# Patient Record
Sex: MALE | Race: WHITE | NOT HISPANIC OR LATINO | Employment: FULL TIME | URBAN - METROPOLITAN AREA
[De-identification: names, ages, dates, MRNs, and addresses within clinical notes are randomized per-mention and may not be internally consistent; named-entity substitution may affect disease eponyms.]

---

## 2017-04-25 ENCOUNTER — HOSPITAL ENCOUNTER (OUTPATIENT)
Dept: OTHER | Facility: HOSPITAL | Age: 37
Setting detail: SPECIMEN
Discharge: HOME | End: 2017-04-25
Attending: NURSE PRACTITIONER | Admitting: NURSE PRACTITIONER

## 2017-04-25 LAB
ALBUMIN SERPL-MCNC: 4 G/DL (ref 3.5–4.8)
ALBUMIN/GLOB SERPL: 1.3 {RATIO} (ref 1–1.7)
ALP SERPL-CCNC: 83 IU/L (ref 32–91)
ALT SERPL-CCNC: 24 IU/L (ref 17–63)
ANION GAP SERPL CALC-SCNC: 15.3 MMOL/L (ref 10–20)
AST SERPL-CCNC: 25 IU/L (ref 15–41)
BASOPHILS # BLD AUTO: 0 10*3/UL (ref 0–0.2)
BASOPHILS NFR BLD AUTO: 1 % (ref 0–2)
BILIRUB SERPL-MCNC: 0.5 MG/DL (ref 0.3–1.2)
BUN SERPL-MCNC: 7 MG/DL (ref 8–20)
BUN/CREAT SERPL: 8.8 (ref 6.2–20.3)
CALCIUM SERPL-MCNC: 9.6 MG/DL (ref 8.9–10.3)
CHLORIDE SERPL-SCNC: 102 MMOL/L (ref 101–111)
CHOLEST SERPL-MCNC: 270 MG/DL
CHOLEST/HDLC SERPL: 7.7 {RATIO}
CONV CO2: 26 MMOL/L (ref 22–32)
CONV LDL CHOLESTEROL DIRECT: 167 MG/DL (ref 0–100)
CONV TOTAL PROTEIN: 7 G/DL (ref 6.1–7.9)
CREAT UR-MCNC: 0.8 MG/DL (ref 0.7–1.2)
DIFFERENTIAL METHOD BLD: (no result)
EOSINOPHIL # BLD AUTO: 0 % (ref 0–3)
EOSINOPHIL # BLD AUTO: 0 10*3/UL (ref 0–0.3)
ERYTHROCYTE [DISTWIDTH] IN BLOOD BY AUTOMATED COUNT: 14.4 % (ref 11.5–14.5)
GLOBULIN UR ELPH-MCNC: 3 G/DL (ref 2.5–3.8)
GLUCOSE SERPL-MCNC: 107 MG/DL (ref 65–99)
HCT VFR BLD AUTO: 45.5 % (ref 40–54)
HDLC SERPL-MCNC: 35 MG/DL
HGB BLD-MCNC: 15.5 G/DL (ref 14–18)
LDLC/HDLC SERPL: 4.8 {RATIO}
LIPID INTERPRETATION: ABNORMAL
LYMPHOCYTES # BLD AUTO: 1.2 10*3/UL (ref 0.8–4.8)
LYMPHOCYTES NFR BLD AUTO: 21 % (ref 18–42)
MCH RBC QN AUTO: 28.8 PG (ref 26–32)
MCHC RBC AUTO-ENTMCNC: 34.1 G/DL (ref 32–36)
MCV RBC AUTO: 84.5 FL (ref 80–94)
MONOCYTES # BLD AUTO: 0.4 10*3/UL (ref 0.1–1.3)
MONOCYTES NFR BLD AUTO: 7 % (ref 2–11)
NEUTROPHILS # BLD AUTO: 3.8 10*3/UL (ref 2.3–8.6)
NEUTROPHILS NFR BLD AUTO: 71 % (ref 50–75)
NRBC BLD AUTO-RTO: 0 /100{WBCS}
NRBC/RBC NFR BLD MANUAL: 0 10*3/UL
PLATELET # BLD AUTO: 215 10*3/UL (ref 150–450)
PMV BLD AUTO: 8.7 FL (ref 7.4–10.4)
POTASSIUM SERPL-SCNC: 4.3 MMOL/L (ref 3.6–5.1)
RBC # BLD AUTO: 5.38 10*6/UL (ref 4.6–6)
SODIUM SERPL-SCNC: 139 MMOL/L (ref 136–144)
TRIGL SERPL-MCNC: 442 MG/DL
TSH SERPL-ACNC: 1.35 UIU/ML (ref 0.34–5.6)
VLDLC SERPL CALC-MCNC: 67.7 MG/DL
WBC # BLD AUTO: 5.5 10*3/UL (ref 4.5–11.5)

## 2017-09-22 ENCOUNTER — HOSPITAL ENCOUNTER (OUTPATIENT)
Dept: OTHER | Facility: HOSPITAL | Age: 37
Setting detail: SPECIMEN
Discharge: HOME | End: 2017-09-22
Attending: NURSE PRACTITIONER | Admitting: NURSE PRACTITIONER

## 2018-02-27 ENCOUNTER — HOSPITAL ENCOUNTER (OUTPATIENT)
Dept: OTHER | Facility: HOSPITAL | Age: 38
Setting detail: SPECIMEN
Discharge: HOME | End: 2018-02-27
Attending: NURSE PRACTITIONER | Admitting: NURSE PRACTITIONER

## 2018-02-27 LAB
ALBUMIN SERPL-MCNC: 4.2 G/DL (ref 3.5–4.8)
ALBUMIN/GLOB SERPL: 1.3 {RATIO} (ref 1–1.7)
ALP SERPL-CCNC: 100 IU/L (ref 32–91)
ALT SERPL-CCNC: 42 IU/L (ref 17–63)
ANION GAP SERPL CALC-SCNC: 17.4 MMOL/L (ref 10–20)
AST SERPL-CCNC: 41 IU/L (ref 15–41)
BASOPHILS # BLD AUTO: 0 10*3/UL (ref 0–0.2)
BASOPHILS NFR BLD AUTO: 1 % (ref 0–2)
BILIRUB SERPL-MCNC: 0.9 MG/DL (ref 0.3–1.2)
BUN SERPL-MCNC: 6 MG/DL (ref 8–20)
BUN/CREAT SERPL: 7.5 (ref 6.2–20.3)
CALCIUM SERPL-MCNC: 9.1 MG/DL (ref 8.9–10.3)
CHLORIDE SERPL-SCNC: 97 MMOL/L (ref 101–111)
CHOLEST SERPL-MCNC: 272 MG/DL
CHOLEST/HDLC SERPL: 8 {RATIO}
CONV CO2: 21 MMOL/L (ref 22–32)
CONV LDL CHOLESTEROL DIRECT: 178 MG/DL (ref 0–100)
CONV TOTAL PROTEIN: 7.5 G/DL (ref 6.1–7.9)
CREAT UR-MCNC: 0.8 MG/DL (ref 0.7–1.2)
DIFFERENTIAL METHOD BLD: (no result)
EOSINOPHIL # BLD AUTO: 0 % (ref 0–3)
EOSINOPHIL # BLD AUTO: 0 10*3/UL (ref 0–0.3)
ERYTHROCYTE [DISTWIDTH] IN BLOOD BY AUTOMATED COUNT: 14.1 % (ref 11.5–14.5)
GLOBULIN UR ELPH-MCNC: 3.3 G/DL (ref 2.5–3.8)
GLUCOSE SERPL-MCNC: 103 MG/DL (ref 65–99)
HCT VFR BLD AUTO: 42.7 % (ref 40–54)
HDLC SERPL-MCNC: 34 MG/DL
HGB BLD-MCNC: 15 G/DL (ref 14–18)
LDLC/HDLC SERPL: 5.2 {RATIO}
LIPID INTERPRETATION: ABNORMAL
LYMPHOCYTES # BLD AUTO: 1.1 10*3/UL (ref 0.8–4.8)
LYMPHOCYTES NFR BLD AUTO: 18 % (ref 18–42)
MCH RBC QN AUTO: 30.2 PG (ref 26–32)
MCHC RBC AUTO-ENTMCNC: 35.1 G/DL (ref 32–36)
MCV RBC AUTO: 86 FL (ref 80–94)
MONOCYTES # BLD AUTO: 0.4 10*3/UL (ref 0.1–1.3)
MONOCYTES NFR BLD AUTO: 7 % (ref 2–11)
NEUTROPHILS # BLD AUTO: 4.5 10*3/UL (ref 2.3–8.6)
NEUTROPHILS NFR BLD AUTO: 74 % (ref 50–75)
NRBC BLD AUTO-RTO: 0 /100{WBCS}
NRBC/RBC NFR BLD MANUAL: 0 10*3/UL
PLATELET # BLD AUTO: 252 10*3/UL (ref 150–450)
PMV BLD AUTO: 8.1 FL (ref 7.4–10.4)
POTASSIUM SERPL-SCNC: 3.4 MMOL/L (ref 3.6–5.1)
RBC # BLD AUTO: 4.97 10*6/UL (ref 4.6–6)
SODIUM SERPL-SCNC: 132 MMOL/L (ref 136–144)
TRIGL SERPL-MCNC: 342 MG/DL
VLDLC SERPL CALC-MCNC: 60 MG/DL
WBC # BLD AUTO: 6 10*3/UL (ref 4.5–11.5)

## 2018-05-22 ENCOUNTER — HOSPITAL ENCOUNTER (OUTPATIENT)
Dept: OTHER | Facility: HOSPITAL | Age: 38
Setting detail: SPECIMEN
Discharge: HOME | End: 2018-05-22
Attending: NURSE PRACTITIONER | Admitting: NURSE PRACTITIONER

## 2018-05-22 LAB
ALBUMIN SERPL-MCNC: 4 G/DL (ref 3.5–4.8)
ALBUMIN/GLOB SERPL: 1.3 {RATIO} (ref 1–1.7)
ALP SERPL-CCNC: 99 IU/L (ref 32–91)
ALT SERPL-CCNC: 41 IU/L (ref 17–63)
ANION GAP SERPL CALC-SCNC: 12.6 MMOL/L (ref 10–20)
AST SERPL-CCNC: 41 IU/L (ref 15–41)
BILIRUB SERPL-MCNC: 0.6 MG/DL (ref 0.3–1.2)
BUN SERPL-MCNC: 4 MG/DL (ref 8–20)
BUN/CREAT SERPL: 5 (ref 6.2–20.3)
CALCIUM SERPL-MCNC: 9.6 MG/DL (ref 8.9–10.3)
CHLORIDE SERPL-SCNC: 102 MMOL/L (ref 101–111)
CHOLEST SERPL-MCNC: 240 MG/DL
CHOLEST/HDLC SERPL: 6.7 {RATIO}
CONV CO2: 26 MMOL/L (ref 22–32)
CONV LDL CHOLESTEROL DIRECT: 164 MG/DL (ref 0–100)
CONV TOTAL PROTEIN: 7.1 G/DL (ref 6.1–7.9)
CREAT UR-MCNC: 0.8 MG/DL (ref 0.7–1.2)
GLOBULIN UR ELPH-MCNC: 3.1 G/DL (ref 2.5–3.8)
GLUCOSE SERPL-MCNC: 90 MG/DL (ref 65–99)
HDLC SERPL-MCNC: 36 MG/DL
LDLC/HDLC SERPL: 4.6 {RATIO}
LIPID INTERPRETATION: ABNORMAL
POTASSIUM SERPL-SCNC: 3.6 MMOL/L (ref 3.6–5.1)
SODIUM SERPL-SCNC: 137 MMOL/L (ref 136–144)
TRIGL SERPL-MCNC: 291 MG/DL
VLDLC SERPL CALC-MCNC: 40.5 MG/DL

## 2018-11-16 ENCOUNTER — HOSPITAL ENCOUNTER (OUTPATIENT)
Dept: OTHER | Facility: HOSPITAL | Age: 38
Setting detail: SPECIMEN
Discharge: HOME | End: 2018-11-16
Attending: NURSE PRACTITIONER | Admitting: NURSE PRACTITIONER

## 2018-11-16 LAB
ALBUMIN SERPL-MCNC: 3.9 G/DL (ref 3.5–4.8)
ALBUMIN/GLOB SERPL: 1.2 {RATIO} (ref 1–1.7)
ALP SERPL-CCNC: 135 IU/L (ref 32–91)
ALT SERPL-CCNC: 34 IU/L (ref 17–63)
ANION GAP SERPL CALC-SCNC: 13.8 MMOL/L (ref 10–20)
AST SERPL-CCNC: 32 IU/L (ref 15–41)
BASOPHILS # BLD AUTO: 0 10*3/UL (ref 0–0.2)
BASOPHILS NFR BLD AUTO: 0 % (ref 0–2)
BILIRUB SERPL-MCNC: 0.5 MG/DL (ref 0.3–1.2)
BUN SERPL-MCNC: 4 MG/DL (ref 8–20)
BUN/CREAT SERPL: 5.7 (ref 6.2–20.3)
CALCIUM SERPL-MCNC: 8.8 MG/DL (ref 8.9–10.3)
CHLORIDE SERPL-SCNC: 100 MMOL/L (ref 101–111)
CHOLEST SERPL-MCNC: 171 MG/DL
CHOLEST/HDLC SERPL: 4.9 {RATIO}
CONV CO2: 26 MMOL/L (ref 22–32)
CONV LDL CHOLESTEROL DIRECT: 114 MG/DL (ref 0–100)
CONV TOTAL PROTEIN: 7.1 G/DL (ref 6.1–7.9)
CREAT UR-MCNC: 0.7 MG/DL (ref 0.7–1.2)
DIFFERENTIAL METHOD BLD: (no result)
EOSINOPHIL # BLD AUTO: 0 % (ref 0–3)
EOSINOPHIL # BLD AUTO: 0 10*3/UL (ref 0–0.3)
ERYTHROCYTE [DISTWIDTH] IN BLOOD BY AUTOMATED COUNT: 14 % (ref 11.5–14.5)
GLOBULIN UR ELPH-MCNC: 3.2 G/DL (ref 2.5–3.8)
GLUCOSE SERPL-MCNC: 122 MG/DL (ref 65–99)
HCT VFR BLD AUTO: 44.4 % (ref 40–54)
HDLC SERPL-MCNC: 35 MG/DL
HGB BLD-MCNC: 15.4 G/DL (ref 14–18)
LDLC/HDLC SERPL: 3.3 {RATIO}
LIPID INTERPRETATION: ABNORMAL
LYMPHOCYTES # BLD AUTO: 1 10*3/UL (ref 0.8–4.8)
LYMPHOCYTES NFR BLD AUTO: 18 % (ref 18–42)
MCH RBC QN AUTO: 29.4 PG (ref 26–32)
MCHC RBC AUTO-ENTMCNC: 34.7 G/DL (ref 32–36)
MCV RBC AUTO: 84.8 FL (ref 80–94)
MONOCYTES # BLD AUTO: 0.4 10*3/UL (ref 0.1–1.3)
MONOCYTES NFR BLD AUTO: 7 % (ref 2–11)
NEUTROPHILS # BLD AUTO: 4.4 10*3/UL (ref 2.3–8.6)
NEUTROPHILS NFR BLD AUTO: 75 % (ref 50–75)
NRBC BLD AUTO-RTO: 0 /100{WBCS}
NRBC/RBC NFR BLD MANUAL: 0 10*3/UL
PLATELET # BLD AUTO: 207 10*3/UL (ref 150–450)
PMV BLD AUTO: 8.1 FL (ref 7.4–10.4)
POTASSIUM SERPL-SCNC: 3.8 MMOL/L (ref 3.6–5.1)
RBC # BLD AUTO: 5.23 10*6/UL (ref 4.6–6)
SODIUM SERPL-SCNC: 136 MMOL/L (ref 136–144)
TRIGL SERPL-MCNC: 226 MG/DL
URATE SERPL-MCNC: 7.8 MG/DL (ref 4.8–8.7)
VLDLC SERPL CALC-MCNC: 22.5 MG/DL
WBC # BLD AUTO: 5.9 10*3/UL (ref 4.5–11.5)

## 2019-07-03 RX ORDER — CELECOXIB 200 MG/1
CAPSULE ORAL
Qty: 30 CAPSULE | Refills: 0 | Status: SHIPPED | OUTPATIENT
Start: 2019-07-03 | End: 2019-07-31 | Stop reason: SDUPTHER

## 2019-07-09 RX ORDER — OMEPRAZOLE 40 MG/1
CAPSULE, DELAYED RELEASE ORAL
Qty: 90 CAPSULE | Refills: 0 | Status: SHIPPED | OUTPATIENT
Start: 2019-07-09 | End: 2019-07-14 | Stop reason: SDUPTHER

## 2019-07-11 RX ORDER — ATORVASTATIN CALCIUM 10 MG/1
TABLET, FILM COATED ORAL
Qty: 30 TABLET | Refills: 0 | Status: SHIPPED | OUTPATIENT
Start: 2019-07-11 | End: 2019-08-10 | Stop reason: SDUPTHER

## 2019-07-13 PROCEDURE — 87070 CULTURE OTHR SPECIMN AEROBIC: CPT | Performed by: FAMILY MEDICINE

## 2019-07-13 PROCEDURE — 87205 SMEAR GRAM STAIN: CPT | Performed by: FAMILY MEDICINE

## 2019-07-13 PROCEDURE — 87186 SC STD MICRODIL/AGAR DIL: CPT | Performed by: FAMILY MEDICINE

## 2019-07-13 PROCEDURE — 87147 CULTURE TYPE IMMUNOLOGIC: CPT | Performed by: FAMILY MEDICINE

## 2019-07-15 RX ORDER — OMEPRAZOLE 40 MG/1
CAPSULE, DELAYED RELEASE ORAL
Qty: 90 CAPSULE | Refills: 0 | Status: SHIPPED | OUTPATIENT
Start: 2019-07-15 | End: 2020-01-06 | Stop reason: SDUPTHER

## 2019-07-16 ENCOUNTER — TELEPHONE (OUTPATIENT)
Dept: URGENT CARE | Facility: CLINIC | Age: 39
End: 2019-07-16

## 2019-07-31 RX ORDER — CELECOXIB 200 MG/1
CAPSULE ORAL
Qty: 30 CAPSULE | Refills: 0 | Status: SHIPPED | OUTPATIENT
Start: 2019-07-31 | End: 2019-08-26 | Stop reason: SDUPTHER

## 2019-08-12 RX ORDER — ATORVASTATIN CALCIUM 10 MG/1
TABLET, FILM COATED ORAL
Qty: 30 TABLET | Refills: 0 | Status: SHIPPED | OUTPATIENT
Start: 2019-08-12 | End: 2019-09-07 | Stop reason: SDUPTHER

## 2019-08-13 RX ORDER — LISINOPRIL 10 MG/1
TABLET ORAL
Qty: 90 TABLET | Refills: 0 | Status: SHIPPED | OUTPATIENT
Start: 2019-08-13 | End: 2019-11-10 | Stop reason: SDUPTHER

## 2019-08-13 RX ORDER — ALLOPURINOL 100 MG/1
TABLET ORAL
Qty: 90 TABLET | Refills: 0 | Status: SHIPPED | OUTPATIENT
Start: 2019-08-13 | End: 2019-11-10 | Stop reason: SDUPTHER

## 2019-08-26 RX ORDER — CELECOXIB 200 MG/1
CAPSULE ORAL
Qty: 30 CAPSULE | Refills: 0 | Status: SHIPPED | OUTPATIENT
Start: 2019-08-26 | End: 2019-09-23 | Stop reason: SDUPTHER

## 2019-09-09 RX ORDER — ATORVASTATIN CALCIUM 10 MG/1
TABLET, FILM COATED ORAL
Qty: 30 TABLET | Refills: 0 | Status: SHIPPED | OUTPATIENT
Start: 2019-09-09 | End: 2020-01-17 | Stop reason: SDUPTHER

## 2019-09-23 RX ORDER — CELECOXIB 200 MG/1
200 CAPSULE ORAL DAILY
Qty: 90 CAPSULE | Refills: 0 | Status: SHIPPED | OUTPATIENT
Start: 2019-09-23 | End: 2021-03-22

## 2019-09-23 RX ORDER — CELECOXIB 200 MG/1
200 CAPSULE ORAL DAILY
Qty: 30 CAPSULE | Refills: 0 | Status: SHIPPED | OUTPATIENT
Start: 2019-09-23 | End: 2019-09-23 | Stop reason: SDUPTHER

## 2019-11-13 RX ORDER — ALLOPURINOL 100 MG/1
TABLET ORAL
Qty: 90 TABLET | Refills: 0 | Status: SHIPPED | OUTPATIENT
Start: 2019-11-13 | End: 2020-02-10

## 2019-11-13 RX ORDER — LISINOPRIL 10 MG/1
TABLET ORAL
Qty: 90 TABLET | Refills: 0 | Status: SHIPPED | OUTPATIENT
Start: 2019-11-13 | End: 2020-01-17 | Stop reason: SDUPTHER

## 2020-01-06 RX ORDER — OMEPRAZOLE 40 MG/1
40 CAPSULE, DELAYED RELEASE ORAL DAILY
Qty: 90 CAPSULE | Refills: 0 | Status: SHIPPED | OUTPATIENT
Start: 2020-01-06 | End: 2020-04-06

## 2020-01-13 ENCOUNTER — OFFICE VISIT (OUTPATIENT)
Dept: FAMILY MEDICINE CLINIC | Facility: CLINIC | Age: 40
End: 2020-01-13

## 2020-01-13 VITALS
HEIGHT: 64 IN | WEIGHT: 277.7 LBS | OXYGEN SATURATION: 96 % | HEART RATE: 96 BPM | DIASTOLIC BLOOD PRESSURE: 82 MMHG | SYSTOLIC BLOOD PRESSURE: 135 MMHG | BODY MASS INDEX: 47.41 KG/M2

## 2020-01-13 DIAGNOSIS — E66.01 OBESITY, MORBID, BMI 40.0-49.9 (HCC): ICD-10-CM

## 2020-01-13 DIAGNOSIS — R14.2 FLATULENCE, ERUCTATION AND GAS PAIN: ICD-10-CM

## 2020-01-13 DIAGNOSIS — R14.1 FLATULENCE, ERUCTATION AND GAS PAIN: ICD-10-CM

## 2020-01-13 DIAGNOSIS — B35.1 ONYCHOMYCOSIS: ICD-10-CM

## 2020-01-13 DIAGNOSIS — E78.2 MIXED HYPERLIPIDEMIA: ICD-10-CM

## 2020-01-13 DIAGNOSIS — R14.3 FLATULENCE, ERUCTATION AND GAS PAIN: ICD-10-CM

## 2020-01-13 DIAGNOSIS — K21.00 GASTROESOPHAGEAL REFLUX DISEASE WITH ESOPHAGITIS: Primary | ICD-10-CM

## 2020-01-13 DIAGNOSIS — I10 HYPERTENSION, UNSPECIFIED TYPE: ICD-10-CM

## 2020-01-13 DIAGNOSIS — M10.9 GOUTY ARTHRITIS: ICD-10-CM

## 2020-01-13 PROCEDURE — 80061 LIPID PANEL: CPT | Performed by: NURSE PRACTITIONER

## 2020-01-13 PROCEDURE — 99214 OFFICE O/P EST MOD 30 MIN: CPT | Performed by: NURSE PRACTITIONER

## 2020-01-13 PROCEDURE — 85027 COMPLETE CBC AUTOMATED: CPT | Performed by: NURSE PRACTITIONER

## 2020-01-13 PROCEDURE — 36415 COLL VENOUS BLD VENIPUNCTURE: CPT | Performed by: NURSE PRACTITIONER

## 2020-01-13 PROCEDURE — 80053 COMPREHEN METABOLIC PANEL: CPT | Performed by: NURSE PRACTITIONER

## 2020-01-13 PROCEDURE — 84443 ASSAY THYROID STIM HORMONE: CPT | Performed by: NURSE PRACTITIONER

## 2020-01-13 RX ORDER — CETIRIZINE HYDROCHLORIDE 10 MG/1
TABLET ORAL DAILY PRN
COMMUNITY
Start: 2016-12-22 | End: 2020-12-21

## 2020-01-13 NOTE — PROGRESS NOTES
"Farhad Khan is a 39 y.o. male.     Chief Complaint   Patient presents with   • Abdominal Pain   • Bloated   • Gas   • Nail Problem       History of Present Illness     1. HTN/HLD, on lisinopril and bp stable. Has poor diet, does not exercise. Denies cp, soa, palpitations, takes meds as directed.     2. GERD, takes omeprazole daily, stable on meds, if forgets med has terrible gerd next day, lately with bloating, gas and IBS s/s, diarrhea with \"pepples\" frequently, now last 2 weeks feels constipated. Has gallbladder. 2 weeks ago and again recently, took stool softeners and then felt relieved. Reports severe abd pain, diarrhea with lactose and roughage, unable to pinpoint any other triggers. Had u/s in past and reports showed fatty liver dz.     3. Fungal toenails present for approx 1 year, has tried several otc meds w/o improvement, no oral meds d/t fatty liver, normal LFT's.     Subjective     Visit Vitals  /82 (BP Location: Left arm, Patient Position: Sitting, Cuff Size: Adult)   Pulse 96   Ht 162.6 cm (64.02\")   Wt 126 kg (277 lb 11.2 oz)   SpO2 96%   BMI 47.64 kg/m²       The following portions of the patient's history were reviewed and updated as appropriate: allergies, current medications, past family history, past medical history, past social history, past surgical history and problem list.    Review of Systems   Constitutional: Negative for chills, fatigue and fever.   HENT: Negative for dental problem, ear pain, sinus pressure and sore throat.    Eyes: Negative for visual disturbance.   Respiratory: Negative for cough, shortness of breath and wheezing.    Cardiovascular: Negative for chest pain, palpitations and leg swelling.   Gastrointestinal: Positive for constipation, diarrhea and GERD. Negative for abdominal pain, blood in stool, nausea and vomiting.   Genitourinary: Negative for difficulty urinating, frequency, urgency and urinary incontinence.   Musculoskeletal: Negative for " arthralgias, back pain, gait problem, joint swelling, myalgias and neck pain.   Skin: Negative for dry skin, pallor and rash.        fungal toenails   Neurological: Negative for dizziness, seizures, speech difficulty and weakness.   Hematological: Negative for adenopathy.   Psychiatric/Behavioral: Negative for sleep disturbance, depressed mood and stress. The patient is not nervous/anxious.        Objective     Physical Exam   Constitutional: He is oriented to person, place, and time. He appears well-developed and well-nourished. No distress. He is morbidly obese.  HENT:   Head: Normocephalic.   Eyes: Pupils are equal, round, and reactive to light. Conjunctivae are normal.   Neck: Normal range of motion. Neck supple. No JVD present. No thyromegaly present.   Cardiovascular: Normal rate, regular rhythm and normal heart sounds.   No murmur heard.  Pulmonary/Chest: Effort normal and breath sounds normal. No respiratory distress.   Abdominal: Soft. Bowel sounds are normal. He exhibits no distension. There is no tenderness.   Musculoskeletal: Normal range of motion. He exhibits edema. He exhibits no tenderness.   Neurological: He is alert and oriented to person, place, and time. No sensory deficit.   Skin: Skin is warm and dry. No rash noted. He is not diaphoretic. No cyanosis (all toenails yellow/thickened with brownish discoloration of the nailbed/cuticle) or erythema.   Psychiatric: He has a normal mood and affect. His behavior is normal. Judgment normal.   Nursing note and vitals reviewed.        Assessment/Plan   Farhad was seen today for abdominal pain, bloated, gas and nail problem.    Diagnoses and all orders for this visit:    Gastroesophageal reflux disease with esophagitis  Continue omeprazole daily, limit triggers such as salads and lactose. Keep food log, consider linzess and/or bentyl if constipation continues.    Mixed hyperlipidemia  -     Lipid Panel  Continue atorvastatin, discussed in length  Healthier heart diet, exercise 30 min 3 days/week, given [facility] handout for choosing foods, Increase water, limit orange soda.     Hypertension, unspecified type  -     CBC (No Diff)  -     TSH  -     Comprehensive Metabolic Panel  bp stable, rf all meds after reviewing labs    Gouty arthritis  Only uses allopurinol and celebrex when has gout flare, discussed limiting purines    Onychomycosis  Try vicks vapor rub BID to toenails, hx of fatty liver, hold off on oral fungal meds.    Flatulence, eructation and gas pain  Keep food log, eliminate triggers, consider bentyl    Morbid obesity   d/w pt weight loss, healthier lifestyle mods.      Return in 3 mo, will call or give wife information on his labs, will need atorva, lisinopril refilled once labs reviewed.             Glucose   Date Value Ref Range Status   11/16/2018 122 (H) 65 - 99 mg/dL Final     BUN   Date Value Ref Range Status   11/16/2018 4 (L) 8 - 20 mg/dL Final     Creatinine   Date Value Ref Range Status   11/16/2018 0.7 0.7 - 1.2 mg/dl Final     Sodium   Date Value Ref Range Status   11/16/2018 136 136 - 144 mmol/L Final     Potassium   Date Value Ref Range Status   11/16/2018 3.8 3.6 - 5.1 mmol/L Final     Chloride   Date Value Ref Range Status   11/16/2018 100 (L) 101 - 111 mmol/L Final     CO2   Date Value Ref Range Status   11/16/2018 26 22 - 32 mmol/L Final     Calcium   Date Value Ref Range Status   11/16/2018 8.8 (L) 8.9 - 10.3 mg/dL Final     Total Protein   Date Value Ref Range Status   11/16/2018 7.1 6.1 - 7.9 g/dL Final     Albumin   Date Value Ref Range Status   11/16/2018 3.9 3.5 - 4.8 g/dL Final     ALT (SGPT)   Date Value Ref Range Status   11/16/2018 34 17 - 63 IU/L Final     AST (SGOT)   Date Value Ref Range Status   11/16/2018 32 15 - 41 IU/L Final     Alkaline Phosphatase   Date Value Ref Range Status   11/16/2018 135 (H) 32 - 91 IU/L Final     Total Bilirubin   Date Value Ref Range Status   11/16/2018 0.5 0.3 - 1.2 mg/dL Final     A/G  Ratio   Date Value Ref Range Status   11/16/2018 1.2 1.0 - 1.7 Final     BUN/Creatinine Ratio   Date Value Ref Range Status   11/16/2018 5.7 (L) 6.2 - 20.3 Final     Anion Gap   Date Value Ref Range Status   11/16/2018 13.8 10 - 20 Final

## 2020-01-14 LAB
ALBUMIN SERPL-MCNC: 4 G/DL (ref 3.5–5.2)
ALBUMIN/GLOB SERPL: 1.1 G/DL
ALP SERPL-CCNC: 152 U/L (ref 39–117)
ALT SERPL W P-5'-P-CCNC: 33 U/L (ref 1–41)
ANION GAP SERPL CALCULATED.3IONS-SCNC: 16.1 MMOL/L (ref 5–15)
AST SERPL-CCNC: 35 U/L (ref 1–40)
BILIRUB SERPL-MCNC: 0.3 MG/DL (ref 0.2–1.2)
BUN BLD-MCNC: 4 MG/DL (ref 6–20)
BUN/CREAT SERPL: 5.7 (ref 7–25)
CALCIUM SPEC-SCNC: 8.8 MG/DL (ref 8.6–10.5)
CHLORIDE SERPL-SCNC: 95 MMOL/L (ref 98–107)
CHOLEST SERPL-MCNC: 181 MG/DL (ref 0–200)
CO2 SERPL-SCNC: 20.9 MMOL/L (ref 22–29)
CREAT BLD-MCNC: 0.7 MG/DL (ref 0.76–1.27)
DEPRECATED RDW RBC AUTO: 40.7 FL (ref 37–54)
ERYTHROCYTE [DISTWIDTH] IN BLOOD BY AUTOMATED COUNT: 13.2 % (ref 12.3–15.4)
GFR SERPL CREATININE-BSD FRML MDRD: 124 ML/MIN/1.73
GLOBULIN UR ELPH-MCNC: 3.5 GM/DL
GLUCOSE BLD-MCNC: 93 MG/DL (ref 65–99)
HCT VFR BLD AUTO: 44.7 % (ref 37.5–51)
HDLC SERPL-MCNC: 31 MG/DL (ref 40–60)
HGB BLD-MCNC: 15.1 G/DL (ref 13–17.7)
LDLC SERPL CALC-MCNC: 105 MG/DL (ref 0–100)
LDLC/HDLC SERPL: 3.37 {RATIO}
MCH RBC QN AUTO: 29 PG (ref 26.6–33)
MCHC RBC AUTO-ENTMCNC: 33.8 G/DL (ref 31.5–35.7)
MCV RBC AUTO: 85.8 FL (ref 79–97)
PLATELET # BLD AUTO: 195 10*3/MM3 (ref 140–450)
PMV BLD AUTO: 10.3 FL (ref 6–12)
POTASSIUM BLD-SCNC: 3.7 MMOL/L (ref 3.5–5.2)
PROT SERPL-MCNC: 7.5 G/DL (ref 6–8.5)
RBC # BLD AUTO: 5.21 10*6/MM3 (ref 4.14–5.8)
SODIUM BLD-SCNC: 132 MMOL/L (ref 136–145)
TRIGL SERPL-MCNC: 227 MG/DL (ref 0–150)
TSH SERPL DL<=0.05 MIU/L-ACNC: 1.76 UIU/ML (ref 0.27–4.2)
VLDLC SERPL-MCNC: 45.4 MG/DL (ref 5–40)
WBC NRBC COR # BLD: 4.54 10*3/MM3 (ref 3.4–10.8)

## 2020-01-17 RX ORDER — LISINOPRIL 10 MG/1
10 TABLET ORAL DAILY
Qty: 90 TABLET | Refills: 1 | Status: SHIPPED | OUTPATIENT
Start: 2020-01-17 | End: 2020-07-30

## 2020-01-17 RX ORDER — ATORVASTATIN CALCIUM 10 MG/1
10 TABLET, FILM COATED ORAL NIGHTLY
Qty: 90 TABLET | Refills: 1 | Status: SHIPPED | OUTPATIENT
Start: 2020-01-17 | End: 2020-07-23

## 2020-02-10 RX ORDER — ALLOPURINOL 100 MG/1
TABLET ORAL
Qty: 90 TABLET | Refills: 0 | Status: SHIPPED | OUTPATIENT
Start: 2020-02-10 | End: 2020-05-07

## 2020-04-06 RX ORDER — OMEPRAZOLE 40 MG/1
CAPSULE, DELAYED RELEASE ORAL
Qty: 90 CAPSULE | Refills: 0 | Status: SHIPPED | OUTPATIENT
Start: 2020-04-06 | End: 2020-07-02

## 2020-05-07 RX ORDER — ALLOPURINOL 100 MG/1
TABLET ORAL
Qty: 90 TABLET | Refills: 0 | Status: SHIPPED | OUTPATIENT
Start: 2020-05-07 | End: 2020-08-05

## 2020-07-02 RX ORDER — OMEPRAZOLE 40 MG/1
CAPSULE, DELAYED RELEASE ORAL
Qty: 90 CAPSULE | Refills: 0 | Status: SHIPPED | OUTPATIENT
Start: 2020-07-02 | End: 2020-10-02

## 2020-07-23 RX ORDER — ATORVASTATIN CALCIUM 10 MG/1
TABLET, FILM COATED ORAL
Qty: 90 TABLET | Refills: 0 | Status: SHIPPED | OUTPATIENT
Start: 2020-07-23 | End: 2020-10-26

## 2020-07-30 RX ORDER — LISINOPRIL 10 MG/1
TABLET ORAL
Qty: 90 TABLET | Refills: 0 | Status: SHIPPED | OUTPATIENT
Start: 2020-07-30 | End: 2020-10-30

## 2020-08-05 RX ORDER — ALLOPURINOL 100 MG/1
TABLET ORAL
Qty: 90 TABLET | Refills: 0 | Status: SHIPPED | OUTPATIENT
Start: 2020-08-05 | End: 2020-10-30

## 2020-10-02 RX ORDER — OMEPRAZOLE 40 MG/1
CAPSULE, DELAYED RELEASE ORAL
Qty: 90 CAPSULE | Refills: 0 | Status: SHIPPED | OUTPATIENT
Start: 2020-10-02 | End: 2021-01-05

## 2020-10-26 RX ORDER — ATORVASTATIN CALCIUM 10 MG/1
TABLET, FILM COATED ORAL
Qty: 90 TABLET | Refills: 0 | Status: SHIPPED | OUTPATIENT
Start: 2020-10-26 | End: 2020-12-21

## 2020-10-30 RX ORDER — ALLOPURINOL 100 MG/1
TABLET ORAL
Qty: 90 TABLET | Refills: 0 | Status: SHIPPED | OUTPATIENT
Start: 2020-10-30 | End: 2021-01-28

## 2020-10-30 RX ORDER — LISINOPRIL 10 MG/1
TABLET ORAL
Qty: 90 TABLET | Refills: 0 | Status: SHIPPED | OUTPATIENT
Start: 2020-10-30 | End: 2021-01-28

## 2020-11-20 ENCOUNTER — OFFICE VISIT (OUTPATIENT)
Dept: FAMILY MEDICINE CLINIC | Facility: CLINIC | Age: 40
End: 2020-11-20

## 2020-11-20 VITALS
DIASTOLIC BLOOD PRESSURE: 80 MMHG | BODY MASS INDEX: 46.61 KG/M2 | SYSTOLIC BLOOD PRESSURE: 124 MMHG | HEIGHT: 64 IN | WEIGHT: 273 LBS | TEMPERATURE: 97.7 F | HEART RATE: 82 BPM | OXYGEN SATURATION: 99 %

## 2020-11-20 DIAGNOSIS — E78.2 MIXED HYPERLIPIDEMIA: ICD-10-CM

## 2020-11-20 DIAGNOSIS — R19.7 DIARRHEA, UNSPECIFIED TYPE: ICD-10-CM

## 2020-11-20 DIAGNOSIS — R10.84 GENERALIZED ABDOMINAL PAIN: Primary | ICD-10-CM

## 2020-11-20 DIAGNOSIS — I10 HYPERTENSION, UNSPECIFIED TYPE: ICD-10-CM

## 2020-11-20 PROCEDURE — 36415 COLL VENOUS BLD VENIPUNCTURE: CPT | Performed by: NURSE PRACTITIONER

## 2020-11-20 PROCEDURE — 80061 LIPID PANEL: CPT | Performed by: NURSE PRACTITIONER

## 2020-11-20 PROCEDURE — 80053 COMPREHEN METABOLIC PANEL: CPT | Performed by: NURSE PRACTITIONER

## 2020-11-20 PROCEDURE — 84443 ASSAY THYROID STIM HORMONE: CPT | Performed by: NURSE PRACTITIONER

## 2020-11-20 PROCEDURE — 85025 COMPLETE CBC W/AUTO DIFF WBC: CPT | Performed by: NURSE PRACTITIONER

## 2020-11-20 PROCEDURE — 99214 OFFICE O/P EST MOD 30 MIN: CPT | Performed by: NURSE PRACTITIONER

## 2020-11-20 NOTE — PATIENT INSTRUCTIONS
Diarrhea, Adult  Diarrhea is frequent loose and watery bowel movements. Diarrhea can make you feel weak and cause you to become dehydrated. Dehydration can make you tired and thirsty, cause you to have a dry mouth, and decrease how often you urinate.  Diarrhea typically lasts 2-3 days. However, it can last longer if it is a sign of something more serious. It is important to treat your diarrhea as told by your health care provider.  Follow these instructions at home:  Eating and drinking         Follow these recommendations as told by your health care provider:  · Take an oral rehydration solution (ORS). This is an over-the-counter medicine that helps return your body to its normal balance of nutrients and water. It is found at pharmacies and retail stores.  · Drink plenty of fluids, such as water, ice chips, diluted fruit juice, and low-calorie sports drinks. You can drink milk also, if desired.  · Avoid drinking fluids that contain a lot of sugar or caffeine, such as energy drinks, sports drinks, and soda.  · Eat bland, easy-to-digest foods in small amounts as you are able. These foods include bananas, applesauce, rice, lean meats, toast, and crackers.  · Avoid alcohol.  · Avoid spicy or fatty foods.    Medicines  · Take over-the-counter and prescription medicines only as told by your health care provider.  · If you were prescribed an antibiotic medicine, take it as told by your health care provider. Do not stop using the antibiotic even if you start to feel better.  General instructions    · Wash your hands often using soap and water. If soap and water are not available, use a hand . Others in the household should wash their hands as well. Hands should be washed:  ? After using the toilet or changing a diaper.  ? Before preparing, cooking, or serving food.  ? While caring for a sick person or while visiting someone in a hospital.  · Drink enough fluid to keep your urine pale yellow.  · Rest at home while  you recover.  · Watch your condition for any changes.  · Take a warm bath to relieve any burning or pain from frequent diarrhea episodes.  · Keep all follow-up visits as told by your health care provider. This is important.  Contact a health care provider if:  · You have a fever.  · Your diarrhea gets worse.  · You have new symptoms.  · You cannot keep fluids down.  · You feel light-headed or dizzy.  · You have a headache.  · You have muscle cramps.  Get help right away if:  · You have chest pain.  · You feel extremely weak or you faint.  · You have bloody or black stools or stools that look like tar.  · You have severe pain, cramping, or bloating in your abdomen.  · You have trouble breathing or you are breathing very quickly.  · Your heart is beating very quickly.  · Your skin feels cold and clammy.  · You feel confused.  · You have signs of dehydration, such as:  ? Dark urine, very little urine, or no urine.  ? Cracked lips.  ? Dry mouth.  ? Sunken eyes.  ? Sleepiness.  ? Weakness.  Summary  · Diarrhea is frequent loose and watery bowel movements. Diarrhea can make you feel weak and cause you to become dehydrated.  · Drink enough fluids to keep your urine pale yellow.  · Make sure that you wash your hands after using the toilet. If soap and water are not available, use hand .  · Contact a health care provider if your diarrhea gets worse or you have new symptoms.  · Get help right away if you have signs of dehydration.  This information is not intended to replace advice given to you by your health care provider. Make sure you discuss any questions you have with your health care provider.  Document Released: 12/08/2003 Document Revised: 05/05/2020 Document Reviewed: 05/24/2019  [employer] Patient Education © 2020 [employer]

## 2020-11-20 NOTE — PROGRESS NOTES
Chief Complaint   Patient presents with   • Abdominal Pain   • Bloated       Abdominal Pain  This is a new problem. The current episode started more than 1 month ago (approximately 2 months ago). The onset quality is gradual. The problem occurs 2 to 4 times per day (at least two episodes of diarrhea with each meal). The problem has been gradually worsening. The pain is located in the generalized abdominal region. The pain is mild. The quality of the pain is a sensation of fullness and aching. The abdominal pain does not radiate. Associated symptoms include diarrhea, flatus and nausea. Pertinent negatives include no arthralgias, constipation, dysuria, fever, frequency, vomiting or weight loss. The pain is aggravated by eating and certain positions. The pain is relieved by nothing. Treatments tried: anti-diarrheals, stool softeners. The treatment provided no relief.     History obtained from the patient and his wife.    The following portions of the patient's history were reviewed and updated as appropriate: allergies, current medications, past family history, past medical history, past social history, past surgical history and problem list.    Past Medical History:   Diagnosis Date   • B12 deficiency    • Chronic fatigue    • Dizziness    • Elevated LFTs    • Fatty liver    • GERD (gastroesophageal reflux disease)    • Gout    • Headache    • HSV infection    • Hyperglycemia    • Hyperlipidemia    • Hypertension    • Obesity    • Obstructive sleep apnea    • Onychomycosis    • Pain in joint, multiple sites    • Sebaceous cyst    • Skin nodule    • Snoring    • Vision changes      History reviewed. No pertinent surgical history.  Family History   Problem Relation Age of Onset   • Sleep apnea Father    • Hypertension Other    • Rheum arthritis Other      Social History     Tobacco Use   • Smoking status: Never Smoker   Substance Use Topics   • Alcohol use: No     Frequency: Never         Current Outpatient Medications:  "  •  allopurinol (ZYLOPRIM) 100 MG tablet, TAKE ONE TABLET BY MOUTH DAILY, Disp: 90 tablet, Rfl: 0  •  atorvastatin (LIPITOR) 10 MG tablet, TAKE ONE TABLET BY MOUTH ONCE NIGHTLY, Disp: 90 tablet, Rfl: 0  •  cetirizine (zyrTEC) 10 MG tablet, Daily As Needed., Disp: , Rfl:   •  lisinopril (PRINIVIL,ZESTRIL) 10 MG tablet, TAKE ONE TABLET BY MOUTH DAILY, Disp: 90 tablet, Rfl: 0  •  omeprazole (priLOSEC) 40 MG capsule, TAKE ONE CAPSULE BY MOUTH DAILY, Disp: 90 capsule, Rfl: 0  •  celecoxib (CeleBREX) 200 MG capsule, TAKE 1 CAPSULE BY MOUTH DAILY, Disp: 90 capsule, Rfl: 0        Review of Systems   Constitutional: Negative for fever and unexpected weight loss.   HENT: Negative for sore throat.    Respiratory: Negative for cough and wheezing.    Cardiovascular: Negative for leg swelling.   Gastrointestinal: Positive for abdominal pain, diarrhea, flatus and nausea. Negative for constipation, vomiting and indigestion.   Genitourinary: Negative for dysuria, frequency and urgency.   Musculoskeletal: Negative for arthralgias and gait problem.   Skin: Negative for rash and skin lesions.   Neurological: Negative for dizziness, weakness and headache.   Psychiatric/Behavioral: Negative for depressed mood. The patient is not nervous/anxious.        Vitals:    11/20/20 1332   BP: 124/80   BP Location: Left arm   Patient Position: Sitting   Cuff Size: Large Adult   Pulse: 82   Temp: 97.7 °F (36.5 °C)   TempSrc: Skin   SpO2: 99%   Weight: 124 kg (273 lb)   Height: 162.6 cm (64.02\")     Body mass index is 46.84 kg/m².      PHQ-9 Depression Screening  Little interest or pleasure in doing things? 0   Feeling down, depressed, or hopeless? 0   Trouble falling or staying asleep, or sleeping too much?     Feeling tired or having little energy?     Poor appetite or overeating?     Feeling bad about yourself - or that you are a failure or have let yourself or your family down?     Trouble concentrating on things, such as reading the newspaper " or watching television?     Moving or speaking so slowly that other people could have noticed? Or the opposite - being so fidgety or restless that you have been moving around a lot more than usual?     Thoughts that you would be better off dead, or of hurting yourself in some way?     PHQ-9 Total Score 0   If you checked off any problems, how difficult have these problems made it for you to do your work, take care of things at home, or get along with other people? @[facility]([phone]  [id number])@        Physical Exam  Constitutional:       Appearance: Normal appearance. He is obese.   HENT:      Head: Normocephalic.   Neck:      Musculoskeletal: Neck supple.   Cardiovascular:      Rate and Rhythm: Normal rate and regular rhythm.   Pulmonary:      Effort: Pulmonary effort is normal.      Breath sounds: Normal breath sounds.   Abdominal:      General: Bowel sounds are decreased. There is distension.      Palpations: Abdomen is soft.      Tenderness: There is no abdominal tenderness.   Musculoskeletal: Normal range of motion.      Right lower leg: No edema.      Left lower leg: No edema.   Skin:     General: Skin is warm and dry.   Neurological:      Mental Status: He is alert and oriented to person, place, and time.      Gait: Gait is intact.   Psychiatric:         Attention and Perception: Attention normal.         Mood and Affect: Mood normal.         Speech: Speech normal.         No visits with results within 7 Day(s) from this visit.   Latest known visit with results is:   Office Visit on 01/13/2020   Component Date Value Ref Range Status   • WBC 01/13/2020 4.54  3.40 - 10.80 10*3/mm3 Final   • RBC 01/13/2020 5.21  4.14 - 5.80 10*6/mm3 Final   • Hemoglobin 01/13/2020 15.1  13.0 - 17.7 g/dL Final   • Hematocrit 01/13/2020 44.7  37.5 - 51.0 % Final   • MCV 01/13/2020 85.8  79.0 - 97.0 fL Final   • MCH 01/13/2020 29.0  26.6 - 33.0 pg Final   • MCHC 01/13/2020 33.8  31.5 - 35.7 g/dL Final   • RDW 01/13/2020 13.2  12.3 -  15.4 % Final   • RDW-SD 01/13/2020 40.7  37.0 - 54.0 fl Final   • MPV 01/13/2020 10.3  6.0 - 12.0 fL Final   • Platelets 01/13/2020 195  140 - 450 10*3/mm3 Final   • Total Cholesterol 01/13/2020 181  0 - 200 mg/dL Final   • Triglycerides 01/13/2020 227* 0 - 150 mg/dL Final   • HDL Cholesterol 01/13/2020 31* 40 - 60 mg/dL Final   • LDL Cholesterol  01/13/2020 105* 0 - 100 mg/dL Final   • VLDL Cholesterol 01/13/2020 45.4* 5 - 40 mg/dL Final   • LDL/HDL Ratio 01/13/2020 3.37   Final   • TSH 01/13/2020 1.760  0.270 - 4.200 uIU/mL Final   • Glucose 01/13/2020 93  65 - 99 mg/dL Final   • BUN 01/13/2020 4* 6 - 20 mg/dL Final   • Creatinine 01/13/2020 0.70* 0.76 - 1.27 mg/dL Final   • Sodium 01/13/2020 132* 136 - 145 mmol/L Final   • Potassium 01/13/2020 3.7  3.5 - 5.2 mmol/L Final   • Chloride 01/13/2020 95* 98 - 107 mmol/L Final   • CO2 01/13/2020 20.9* 22.0 - 29.0 mmol/L Final   • Calcium 01/13/2020 8.8  8.6 - 10.5 mg/dL Final   • Total Protein 01/13/2020 7.5  6.0 - 8.5 g/dL Final   • Albumin 01/13/2020 4.00  3.50 - 5.20 g/dL Final   • ALT (SGPT) 01/13/2020 33  1 - 41 U/L Final   • AST (SGOT) 01/13/2020 35  1 - 40 U/L Final   • Alkaline Phosphatase 01/13/2020 152* 39 - 117 U/L Final   • Total Bilirubin 01/13/2020 0.3  0.2 - 1.2 mg/dL Final   • eGFR Non African Amer 01/13/2020 124  >60 mL/min/1.73 Final   • Globulin 01/13/2020 3.5  gm/dL Final   • A/G Ratio 01/13/2020 1.1  g/dL Final   • BUN/Creatinine Ratio 01/13/2020 5.7* 7.0 - 25.0 Final   • Anion Gap 01/13/2020 16.1* 5.0 - 15.0 mmol/L Final       Problem List Items Addressed This Visit        Cardiovascular and Mediastinum    Mixed hyperlipidemia    Current Assessment & Plan     1.  Order lipid panel         Relevant Orders    Lipid Panel    Hypertension    Relevant Orders    TSH       Digestive    Diarrhea    Current Assessment & Plan     1.  Stool studies ordered  2.  Patient to hold all laxatives and stool softeners until his testing results         Relevant Orders     CBC & Differential    Comprehensive Metabolic Panel    Stool Culture (Reference Lab) - Stool, Per Rectum    Fecal Leukocytes - Stool, Per Rectum    Ova & Parasite Examination - Stool, Per Rectum    Gastrointestinal Panel, PCR - Stool, Per Rectum    CBC Auto Differential       Nervous and Auditory    Generalized abdominal pain - Primary    Current Assessment & Plan     1.  Order KUB  2.  If negative, consider CT scan versus GI referral         Relevant Orders    XR Abdomen KUB       Return in about 1 week (around 11/27/2020).

## 2020-11-20 NOTE — ASSESSMENT & PLAN NOTE
1.  Stool studies ordered  2.  Patient to hold all laxatives and stool softeners until his testing results

## 2020-11-21 LAB
ALBUMIN SERPL-MCNC: 4 G/DL (ref 3.5–5.2)
ALBUMIN/GLOB SERPL: 1.1 G/DL
ALP SERPL-CCNC: 236 U/L (ref 39–117)
ALT SERPL W P-5'-P-CCNC: 48 U/L (ref 1–41)
ANION GAP SERPL CALCULATED.3IONS-SCNC: 10 MMOL/L (ref 5–15)
AST SERPL-CCNC: 68 U/L (ref 1–40)
BASOPHILS # BLD AUTO: 0 10*3/MM3 (ref 0–0.2)
BASOPHILS NFR BLD AUTO: 0 % (ref 0–1.5)
BILIRUB SERPL-MCNC: 0.6 MG/DL (ref 0–1.2)
BUN SERPL-MCNC: 3 MG/DL (ref 6–20)
BUN/CREAT SERPL: 4.9 (ref 7–25)
CALCIUM SPEC-SCNC: 8.7 MG/DL (ref 8.6–10.5)
CHLORIDE SERPL-SCNC: 101 MMOL/L (ref 98–107)
CHOLEST SERPL-MCNC: 191 MG/DL (ref 0–200)
CO2 SERPL-SCNC: 26 MMOL/L (ref 22–29)
CREAT SERPL-MCNC: 0.61 MG/DL (ref 0.76–1.27)
DEPRECATED RDW RBC AUTO: 44.6 FL (ref 37–54)
EOSINOPHIL # BLD AUTO: 0 10*3/MM3 (ref 0–0.4)
EOSINOPHIL NFR BLD AUTO: 0 % (ref 0.3–6.2)
ERYTHROCYTE [DISTWIDTH] IN BLOOD BY AUTOMATED COUNT: 13.9 % (ref 12.3–15.4)
GFR SERPL CREATININE-BSD FRML MDRD: 145 ML/MIN/1.73
GLOBULIN UR ELPH-MCNC: 3.6 GM/DL
GLUCOSE SERPL-MCNC: 91 MG/DL (ref 65–99)
HCT VFR BLD AUTO: 45.9 % (ref 37.5–51)
HDLC SERPL-MCNC: 25 MG/DL (ref 40–60)
HGB BLD-MCNC: 15.2 G/DL (ref 13–17.7)
IMM GRANULOCYTES # BLD AUTO: 0.01 10*3/MM3 (ref 0–0.05)
IMM GRANULOCYTES NFR BLD AUTO: 0.2 % (ref 0–0.5)
LDLC SERPL CALC-MCNC: 112 MG/DL (ref 0–100)
LDLC/HDLC SERPL: 4.16 {RATIO}
LYMPHOCYTES # BLD AUTO: 0.98 10*3/MM3 (ref 0.7–3.1)
LYMPHOCYTES NFR BLD AUTO: 23.5 % (ref 19.6–45.3)
MCH RBC QN AUTO: 29.2 PG (ref 26.6–33)
MCHC RBC AUTO-ENTMCNC: 33.1 G/DL (ref 31.5–35.7)
MCV RBC AUTO: 88.3 FL (ref 79–97)
MONOCYTES # BLD AUTO: 0.37 10*3/MM3 (ref 0.1–0.9)
MONOCYTES NFR BLD AUTO: 8.9 % (ref 5–12)
NEUTROPHILS NFR BLD AUTO: 2.81 10*3/MM3 (ref 1.7–7)
NEUTROPHILS NFR BLD AUTO: 67.4 % (ref 42.7–76)
NRBC BLD AUTO-RTO: 0 /100 WBC (ref 0–0.2)
PLATELET # BLD AUTO: 179 10*3/MM3 (ref 140–450)
PMV BLD AUTO: 11.1 FL (ref 6–12)
POTASSIUM SERPL-SCNC: 3.5 MMOL/L (ref 3.5–5.2)
PROT SERPL-MCNC: 7.6 G/DL (ref 6–8.5)
RBC # BLD AUTO: 5.2 10*6/MM3 (ref 4.14–5.8)
SODIUM SERPL-SCNC: 137 MMOL/L (ref 136–145)
TRIGL SERPL-MCNC: 310 MG/DL (ref 0–150)
TSH SERPL DL<=0.05 MIU/L-ACNC: 1.61 UIU/ML (ref 0.27–4.2)
VLDLC SERPL-MCNC: 54 MG/DL (ref 5–40)
WBC # BLD AUTO: 4.17 10*3/MM3 (ref 3.4–10.8)

## 2020-11-23 LAB
ADV 40+41 DNA STL QL NAA+NON-PROBE: NOT DETECTED
ASTRO TYP 1-8 RNA STL QL NAA+NON-PROBE: NOT DETECTED
C CAYETANENSIS DNA STL QL NAA+NON-PROBE: NOT DETECTED
CAMPY SP DNA.DIARRHEA STL QL NAA+PROBE: NOT DETECTED
CRYPTOSP STL CULT: NOT DETECTED
E COLI DNA SPEC QL NAA+PROBE: NOT DETECTED
E HISTOLYT AG STL-ACNC: NOT DETECTED
EAEC PAA PLAS AGGR+AATA ST NAA+NON-PRB: NOT DETECTED
EC STX1 + STX2 GENES STL NAA+PROBE: NOT DETECTED
EPEC EAE GENE STL QL NAA+NON-PROBE: NOT DETECTED
ETEC LTA+ST1A+ST1B TOX ST NAA+NON-PROBE: NOT DETECTED
G LAMBLIA DNA SPEC QL NAA+PROBE: NOT DETECTED
LACTOFERRIN STL QL LA: NEGATIVE
NOROVIRUS GI+II RNA STL QL NAA+NON-PROBE: NOT DETECTED
P SHIGELLOIDES DNA STL QL NAA+PROBE: NOT DETECTED
RV RNA STL NAA+PROBE: NOT DETECTED
SALMONELLA DNA SPEC QL NAA+PROBE: NOT DETECTED
SAPO I+II+IV+V RNA STL QL NAA+NON-PROBE: NOT DETECTED
SHIGELLA SP+EIEC IPAH STL QL NAA+PROBE: NOT DETECTED
V CHOLERAE DNA SPEC QL NAA+PROBE: NOT DETECTED
VIBRIO DNA SPEC NAA+PROBE: NOT DETECTED
YERSINIA STL CULT: NOT DETECTED

## 2020-11-23 PROCEDURE — 0097U HC BIOFIRE FILMARRAY GI PANEL: CPT | Performed by: NURSE PRACTITIONER

## 2020-11-23 PROCEDURE — 83630 LACTOFERRIN FECAL (QUAL): CPT | Performed by: NURSE PRACTITIONER

## 2020-11-23 NOTE — PROGRESS NOTES
Patient notified and indicated understanding.  Pt would like it to be sent to [facility] please.  Just let me know after you order it and I will fax it.  Thank you!

## 2020-11-23 NOTE — PROGRESS NOTES
Please let patient know that his liver enzymes and alk phos are elevated. This could be caused a number of things, but we need to r/o gallbladder and liver problems. Patient will need an abdominal US. Also, his cholesterol is higher than it was last year in all areas. I want him to start exercising, minimizing fried foods, and avoiding trans/saturated fats and start taking Fish Oil 1g daily and as soon as we determine the cause of the elevated liver enzymes, we will adjust his cholesterol medication. The statins can increase liver enzymes so I do not want to increase his medication yet as I may change it. Please let him know the US has been ordered. Please ask where he wants it sent, [facility] or [facility].

## 2020-11-24 DIAGNOSIS — R10.84 GENERALIZED ABDOMINAL PAIN: Primary | ICD-10-CM

## 2020-12-09 ENCOUNTER — TELEPHONE (OUTPATIENT)
Dept: FAMILY MEDICINE CLINIC | Facility: CLINIC | Age: 40
End: 2020-12-09

## 2020-12-09 NOTE — TELEPHONE ENCOUNTER
Left message for patient to see if he had his ultrasound done and to see how he is feeling. He canceled his appt on 11/25 and Shereen wanted to check in on him to see how he was doing.

## 2020-12-17 DIAGNOSIS — K74.60 HEPATIC CIRRHOSIS, UNSPECIFIED HEPATIC CIRRHOSIS TYPE, UNSPECIFIED WHETHER ASCITES PRESENT (HCC): Primary | ICD-10-CM

## 2020-12-21 ENCOUNTER — OFFICE VISIT (OUTPATIENT)
Dept: FAMILY MEDICINE CLINIC | Facility: CLINIC | Age: 40
End: 2020-12-21

## 2020-12-21 VITALS
BODY MASS INDEX: 45.75 KG/M2 | SYSTOLIC BLOOD PRESSURE: 126 MMHG | TEMPERATURE: 97.7 F | WEIGHT: 268 LBS | OXYGEN SATURATION: 96 % | DIASTOLIC BLOOD PRESSURE: 77 MMHG | HEIGHT: 64 IN | HEART RATE: 88 BPM

## 2020-12-21 DIAGNOSIS — K80.00 CALCULUS OF GALLBLADDER WITH ACUTE CHOLECYSTITIS WITHOUT OBSTRUCTION: ICD-10-CM

## 2020-12-21 DIAGNOSIS — I10 HYPERTENSION, UNSPECIFIED TYPE: ICD-10-CM

## 2020-12-21 DIAGNOSIS — M10.9 GOUTY ARTHRITIS: ICD-10-CM

## 2020-12-21 DIAGNOSIS — E78.2 MIXED HYPERLIPIDEMIA: ICD-10-CM

## 2020-12-21 DIAGNOSIS — K74.60 HEPATIC CIRRHOSIS, UNSPECIFIED HEPATIC CIRRHOSIS TYPE, UNSPECIFIED WHETHER ASCITES PRESENT (HCC): Primary | ICD-10-CM

## 2020-12-21 PROBLEM — R53.83 FATIGUE: Status: ACTIVE | Noted: 2017-09-22

## 2020-12-21 PROBLEM — G47.33 OBSTRUCTIVE SLEEP APNEA: Status: ACTIVE | Noted: 2018-04-12

## 2020-12-21 PROBLEM — R51.9 HEADACHE: Status: ACTIVE | Noted: 2017-10-08

## 2020-12-21 PROBLEM — H53.9 VISION CHANGES: Status: ACTIVE | Noted: 2017-04-25

## 2020-12-21 PROBLEM — R42 DIZZINESS: Status: ACTIVE | Noted: 2017-04-25

## 2020-12-21 PROBLEM — M10.072 ACUTE IDIOPATHIC GOUT OF LEFT FOOT: Status: ACTIVE | Noted: 2018-10-23

## 2020-12-21 PROBLEM — E53.8 B12 DEFICIENCY: Status: ACTIVE | Noted: 2018-05-22

## 2020-12-21 PROBLEM — K21.9 GASTROESOPHAGEAL REFLUX DISEASE: Status: ACTIVE | Noted: 2018-02-27

## 2020-12-21 PROBLEM — R73.9 HYPERGLYCEMIA: Status: ACTIVE | Noted: 2018-02-27

## 2020-12-21 PROBLEM — R22.32 LOCALIZED SWELLING, MASS AND LUMP, LEFT UPPER LIMB: Status: ACTIVE | Noted: 2017-09-22

## 2020-12-21 PROCEDURE — 99214 OFFICE O/P EST MOD 30 MIN: CPT | Performed by: NURSE PRACTITIONER

## 2020-12-21 RX ORDER — DICYCLOMINE HCL 20 MG
20 TABLET ORAL 3 TIMES DAILY PRN
Qty: 60 TABLET | Refills: 0 | Status: SHIPPED | OUTPATIENT
Start: 2020-12-21 | End: 2021-01-07

## 2020-12-21 NOTE — PATIENT INSTRUCTIONS
1. Start Vitamin E OTC   2. Bentyl as needed for bloating/cramping  3. Call and sched with Dr. Leary for liver workup  4. Continue omeprazole  5. Stop aotrvastatin  6. Recheck CMP in 4 weeks.   7. Check HIDA

## 2020-12-21 NOTE — PROGRESS NOTES
Chief Complaint   Patient presents with   • Results     f/u from liver u/s       HPI     Abdominal pain, pt has hx of fatty liver, he denies alcohol use, here to review liver u/s, which showed cirrhosis and stone in gallbladder neck. pt reports immediate Nausea, diarrhea, bloating of abdomen after eating, worsening of itching, and discoloration of stool. Pt reports fam hx, sister/mom with gallbladder dz and james. Denies back pain, blood or mucus in stool. Referred to Dr. Leary, has not heard anything regarding appt yet.     Gout, on allopurinol daily, stable, uses celebrex only prn.     HTN, stable on meds and takes as directed, denies chest pain, headache, shortness of air, palpitations and swelling of extremities.       The following portions of the patient's history were reviewed and updated as appropriate: allergies, current medications, past family history, past medical history, past social history, past surgical history and problem list.    Past Medical History:   Diagnosis Date   • B12 deficiency    • Chronic fatigue    • Dizziness    • Elevated LFTs    • Fatty liver    • GERD (gastroesophageal reflux disease)    • Gout    • Headache    • HSV infection    • Hyperglycemia    • Hyperlipidemia    • Hypertension    • Obesity    • Obstructive sleep apnea    • Onychomycosis    • Pain in joint, multiple sites    • Sebaceous cyst    • Skin nodule    • Snoring    • Vision changes      History reviewed. No pertinent surgical history.  Family History   Problem Relation Age of Onset   • Sleep apnea Father    • Hypertension Other    • Rheum arthritis Other      Social History     Tobacco Use   • Smoking status: Never Smoker   • Smokeless tobacco: Never Used   Substance Use Topics   • Alcohol use: No     Frequency: Never         Current Outpatient Medications:   •  allopurinol (ZYLOPRIM) 100 MG tablet, TAKE ONE TABLET BY MOUTH DAILY, Disp: 90 tablet, Rfl: 0  •  celecoxib (CeleBREX) 200 MG capsule, TAKE 1 CAPSULE BY  "MOUTH DAILY (Patient taking differently: Take 200 mg by mouth Daily. prn), Disp: 90 capsule, Rfl: 0  •  lisinopril (PRINIVIL,ZESTRIL) 10 MG tablet, TAKE ONE TABLET BY MOUTH DAILY, Disp: 90 tablet, Rfl: 0  •  omeprazole (priLOSEC) 40 MG capsule, TAKE ONE CAPSULE BY MOUTH DAILY, Disp: 90 capsule, Rfl: 0  •  dicyclomine (Bentyl) 20 MG tablet, Take 1 tablet by mouth 3 (Three) Times a Day As Needed (bloating/cramping with meal)., Disp: 60 tablet, Rfl: 0      Review of Systems     Obtained as mentioned in HPI, otherwise negative.       Vitals:    12/21/20 1024   BP: 126/77   BP Location: Right arm   Patient Position: Sitting   Cuff Size: Large Adult   Pulse: 88   Temp: 97.7 °F (36.5 °C)   TempSrc: Skin   SpO2: 96%   Weight: 122 kg (268 lb)   Height: 162.6 cm (64.02\")     Body mass index is 45.98 kg/m².    Physical Exam  Vitals signs and nursing note reviewed.   Constitutional:       General: He is not in acute distress.     Appearance: He is well-developed. He is not diaphoretic.   HENT:      Head: Normocephalic and atraumatic.   Eyes:      Pupils: Pupils are equal, round, and reactive to light.   Neck:      Musculoskeletal: Normal range of motion.      Thyroid: No thyromegaly.   Cardiovascular:      Rate and Rhythm: Normal rate and regular rhythm.      Heart sounds: Normal heart sounds. No murmur.   Pulmonary:      Effort: Pulmonary effort is normal. No respiratory distress.      Breath sounds: Normal breath sounds.   Abdominal:      General: Bowel sounds are normal. There is distension (generalized).      Palpations: Abdomen is soft.      Tenderness: There is abdominal tenderness.   Musculoskeletal: Normal range of motion.   Skin:     General: Skin is warm and dry.      Findings: No erythema.   Neurological:      Mental Status: He is alert and oriented to person, place, and time.   Psychiatric:         Behavior: Behavior normal.         Thought Content: Thought content normal.         Judgment: Judgment normal. "         No visits with results within 7 Day(s) from this visit.   Latest known visit with results is:   Office Visit on 11/20/2020   Component Date Value Ref Range Status   • Glucose 11/20/2020 91  65 - 99 mg/dL Final   • BUN 11/20/2020 3* 6 - 20 mg/dL Final   • Creatinine 11/20/2020 0.61* 0.76 - 1.27 mg/dL Final   • Sodium 11/20/2020 137  136 - 145 mmol/L Final   • Potassium 11/20/2020 3.5  3.5 - 5.2 mmol/L Final   • Chloride 11/20/2020 101  98 - 107 mmol/L Final   • CO2 11/20/2020 26.0  22.0 - 29.0 mmol/L Final   • Calcium 11/20/2020 8.7  8.6 - 10.5 mg/dL Final   • Total Protein 11/20/2020 7.6  6.0 - 8.5 g/dL Final   • Albumin 11/20/2020 4.00  3.50 - 5.20 g/dL Final   • ALT (SGPT) 11/20/2020 48* 1 - 41 U/L Final   • AST (SGOT) 11/20/2020 68* 1 - 40 U/L Final   • Alkaline Phosphatase 11/20/2020 236* 39 - 117 U/L Final   • Total Bilirubin 11/20/2020 0.6  0.0 - 1.2 mg/dL Final   • eGFR Non African Amer 11/20/2020 145  >60 mL/min/1.73 Final   • Globulin 11/20/2020 3.6  gm/dL Final   • A/G Ratio 11/20/2020 1.1  g/dL Final   • BUN/Creatinine Ratio 11/20/2020 4.9* 7.0 - 25.0 Final   • Anion Gap 11/20/2020 10.0  5.0 - 15.0 mmol/L Final   • Total Cholesterol 11/20/2020 191  0 - 200 mg/dL Final   • Triglycerides 11/20/2020 310* 0 - 150 mg/dL Final   • HDL Cholesterol 11/20/2020 25* 40 - 60 mg/dL Final   • LDL Cholesterol  11/20/2020 112* 0 - 100 mg/dL Final   • VLDL Cholesterol 11/20/2020 54* 5 - 40 mg/dL Final   • LDL/HDL Ratio 11/20/2020 4.16   Final   • TSH 11/20/2020 1.610  0.270 - 4.200 uIU/mL Final   • Campylobacter 11/23/2020 Not Detected  Not Detected Final   • Plesiomonas shigelloides 11/23/2020 Not Detected  Not Detected Final   • Salmonella 11/23/2020 Not Detected  Not Detected Final   • Vibrio 11/23/2020 Not Detected  Not Detected Final   • Vibrio cholerae 11/23/2020 Not Detected  Not Detected Final   • Yersinia enterocolitica 11/23/2020 Not Detected  Not Detected Final   • Enteroaggregative E. coli (EAEC)  11/23/2020 Not Detected  Not Detected Final   • Enteropathogenic E. coli (EPEC) 11/23/2020 Not Detected  Not Detected Final   • Enterotoxigenic E. coli (ETEC) lt/* 11/23/2020 Not Detected  Not Detected Final   • Shiga-like toxin-producing E. coli* 11/23/2020 Not Detected  Not Detected Final   • E. coli O157 11/23/2020 Not Detected  Not Detected Final   • Shigella/Enteroinvasive E. coli (E* 11/23/2020 Not Detected  Not Detected Final   • Cryptosporidium 11/23/2020 Not Detected  Not Detected Final   • Cyclospora cayetanensis 11/23/2020 Not Detected  Not Detected Final   • Entamoeba histolytica 11/23/2020 Not Detected  Not Detected Final   • Giardia lamblia 11/23/2020 Not Detected  Not Detected Final   • Adenovirus F40/41 11/23/2020 Not Detected  Not Detected Final   • Astrovirus 11/23/2020 Not Detected  Not Detected Final   • Norovirus GI/GII 11/23/2020 Not Detected  Not Detected Final   • Rotavirus A 11/23/2020 Not Detected  Not Detected Final   • Sapovirus (I, II, IV or V) 11/23/2020 Not Detected  Not Detected Final   • WBC 11/20/2020 4.17  3.40 - 10.80 10*3/mm3 Final   • RBC 11/20/2020 5.20  4.14 - 5.80 10*6/mm3 Final   • Hemoglobin 11/20/2020 15.2  13.0 - 17.7 g/dL Final   • Hematocrit 11/20/2020 45.9  37.5 - 51.0 % Final   • MCV 11/20/2020 88.3  79.0 - 97.0 fL Final   • MCH 11/20/2020 29.2  26.6 - 33.0 pg Final   • MCHC 11/20/2020 33.1  31.5 - 35.7 g/dL Final   • RDW 11/20/2020 13.9  12.3 - 15.4 % Final   • RDW-SD 11/20/2020 44.6  37.0 - 54.0 fl Final   • MPV 11/20/2020 11.1  6.0 - 12.0 fL Final   • Platelets 11/20/2020 179  140 - 450 10*3/mm3 Final   • Neutrophil % 11/20/2020 67.4  42.7 - 76.0 % Final   • Lymphocyte % 11/20/2020 23.5  19.6 - 45.3 % Final   • Monocyte % 11/20/2020 8.9  5.0 - 12.0 % Final   • Eosinophil % 11/20/2020 0.0* 0.3 - 6.2 % Final   • Basophil % 11/20/2020 0.0  0.0 - 1.5 % Final   • Immature Grans % 11/20/2020 0.2  0.0 - 0.5 % Final   • Neutrophils, Absolute 11/20/2020 2.81  1.70 - 7.00  10*3/mm3 Final   • Lymphocytes, Absolute 11/20/2020 0.98  0.70 - 3.10 10*3/mm3 Final   • Monocytes, Absolute 11/20/2020 0.37  0.10 - 0.90 10*3/mm3 Final   • Eosinophils, Absolute 11/20/2020 0.00  0.00 - 0.40 10*3/mm3 Final   • Basophils, Absolute 11/20/2020 0.00  0.00 - 0.20 10*3/mm3 Final   • Immature Grans, Absolute 11/20/2020 0.01  0.00 - 0.05 10*3/mm3 Final   • nRBC 11/20/2020 0.0  0.0 - 0.2 /100 WBC Final   • Lactoferrin, Qual 11/23/2020 Negative  Negative Final       Diagnoses and all orders for this visit:    1. Hepatic cirrhosis, unspecified hepatic cirrhosis type, unspecified whether ascites present (CMS/HCC) (Primary)  Comments:  pt to see Dr. Leary, [facility] for further eval. ok for prn benadryl for itching.   Orders:  -     Comprehensive Metabolic Panel; Future  -     NM HIDA Scan With Pharmacological Intervention; Future    2. Mixed hyperlipidemia  Comments:  stop atorvastatin for now due to LFT's, strict HHD. recheck lipids in 3mo.     3. Hypertension, unspecified type  Comments:  stable    4. Gouty arthritis  Comments:  stable, cont allopurinol      5. Calculus of gallbladder with acute cholecystitis without obstruction  Comments:  check HIDA, wang needs referral to gen surg, will also await gastro eval.   Orders:  -     NM HIDA Scan With Pharmacological Intervention; Future    Other orders  -     dicyclomine (Bentyl) 20 MG tablet; Take 1 tablet by mouth 3 (Three) Times a Day As Needed (bloating/cramping with meal).  Dispense: 60 tablet; Refill: 0       Recommend otc vitamin E for fatty liver vs cirrhosis  Try bentyl for spasticity    Return in about 3 months (around 3/21/2021) for CMP check in 4 weeks. 3mo f/u .

## 2021-01-05 RX ORDER — OMEPRAZOLE 40 MG/1
CAPSULE, DELAYED RELEASE ORAL
Qty: 90 CAPSULE | Refills: 0 | Status: SHIPPED | OUTPATIENT
Start: 2021-01-05 | End: 2021-03-22 | Stop reason: SDUPTHER

## 2021-01-07 RX ORDER — DICYCLOMINE HCL 20 MG
TABLET ORAL
Qty: 90 TABLET | Refills: 2 | Status: SHIPPED | OUTPATIENT
Start: 2021-01-07

## 2021-01-08 ENCOUNTER — OFFICE (AMBULATORY)
Dept: URBAN - METROPOLITAN AREA CLINIC 64 | Facility: CLINIC | Age: 41
End: 2021-01-08

## 2021-01-08 VITALS
WEIGHT: 266 LBS | HEART RATE: 88 BPM | HEIGHT: 64 IN | SYSTOLIC BLOOD PRESSURE: 127 MMHG | DIASTOLIC BLOOD PRESSURE: 83 MMHG

## 2021-01-08 DIAGNOSIS — K80.20 CALCULUS OF GALLBLADDER WITHOUT CHOLECYSTITIS WITHOUT OBSTRU: ICD-10-CM

## 2021-01-08 DIAGNOSIS — R94.5 ABNORMAL RESULTS OF LIVER FUNCTION STUDIES: ICD-10-CM

## 2021-01-08 DIAGNOSIS — K52.9 NONINFECTIVE GASTROENTERITIS AND COLITIS, UNSPECIFIED: ICD-10-CM

## 2021-01-08 DIAGNOSIS — K21.9 GASTRO-ESOPHAGEAL REFLUX DISEASE WITHOUT ESOPHAGITIS: ICD-10-CM

## 2021-01-08 PROCEDURE — 99204 OFFICE O/P NEW MOD 45 MIN: CPT | Performed by: INTERNAL MEDICINE

## 2021-01-14 ENCOUNTER — TELEPHONE (OUTPATIENT)
Dept: FAMILY MEDICINE CLINIC | Facility: CLINIC | Age: 41
End: 2021-01-14

## 2021-01-14 NOTE — TELEPHONE ENCOUNTER
Left message for patient to remind him to have his KUB test and HIDA scan completed. He was advised if he needs help with scheduling to contact the office.

## 2021-01-21 RX ORDER — ATORVASTATIN CALCIUM 10 MG/1
TABLET, FILM COATED ORAL
Qty: 90 TABLET | Refills: 0 | OUTPATIENT
Start: 2021-01-21

## 2021-01-21 NOTE — TELEPHONE ENCOUNTER
LOV 12/21/20  Next OV 3/22/21    Looks like this medication was discontinued on 12/21/20. Okay to deny?

## 2021-01-28 RX ORDER — ALLOPURINOL 100 MG/1
TABLET ORAL
Qty: 90 TABLET | Refills: 0 | Status: SHIPPED | OUTPATIENT
Start: 2021-01-28 | End: 2021-03-22

## 2021-01-28 RX ORDER — LISINOPRIL 10 MG/1
TABLET ORAL
Qty: 90 TABLET | Refills: 0 | Status: SHIPPED | OUTPATIENT
Start: 2021-01-28 | End: 2021-03-22 | Stop reason: SDUPTHER

## 2021-02-16 ENCOUNTER — OFFICE (AMBULATORY)
Dept: URBAN - METROPOLITAN AREA PATHOLOGY 4 | Facility: PATHOLOGY | Age: 41
End: 2021-02-16
Payer: COMMERCIAL

## 2021-02-16 ENCOUNTER — ON CAMPUS - OUTPATIENT (AMBULATORY)
Dept: URBAN - METROPOLITAN AREA HOSPITAL 2 | Facility: HOSPITAL | Age: 41
End: 2021-02-16

## 2021-02-16 VITALS
TEMPERATURE: 97.7 F | HEART RATE: 87 BPM | OXYGEN SATURATION: 94 % | HEART RATE: 94 BPM | SYSTOLIC BLOOD PRESSURE: 131 MMHG | SYSTOLIC BLOOD PRESSURE: 112 MMHG | HEART RATE: 90 BPM | DIASTOLIC BLOOD PRESSURE: 60 MMHG | WEIGHT: 257 LBS | DIASTOLIC BLOOD PRESSURE: 58 MMHG | SYSTOLIC BLOOD PRESSURE: 123 MMHG | DIASTOLIC BLOOD PRESSURE: 68 MMHG | DIASTOLIC BLOOD PRESSURE: 66 MMHG | SYSTOLIC BLOOD PRESSURE: 125 MMHG | HEART RATE: 85 BPM | HEART RATE: 89 BPM | OXYGEN SATURATION: 97 % | RESPIRATION RATE: 16 BRPM | HEART RATE: 83 BPM | HEART RATE: 88 BPM | SYSTOLIC BLOOD PRESSURE: 106 MMHG | SYSTOLIC BLOOD PRESSURE: 153 MMHG | SYSTOLIC BLOOD PRESSURE: 118 MMHG | RESPIRATION RATE: 18 BRPM | OXYGEN SATURATION: 100 % | OXYGEN SATURATION: 96 % | DIASTOLIC BLOOD PRESSURE: 96 MMHG | DIASTOLIC BLOOD PRESSURE: 78 MMHG | SYSTOLIC BLOOD PRESSURE: 117 MMHG | HEIGHT: 64 IN | DIASTOLIC BLOOD PRESSURE: 70 MMHG | HEART RATE: 84 BPM

## 2021-02-16 DIAGNOSIS — K21.9 GASTRO-ESOPHAGEAL REFLUX DISEASE WITHOUT ESOPHAGITIS: ICD-10-CM

## 2021-02-16 DIAGNOSIS — K44.9 DIAPHRAGMATIC HERNIA WITHOUT OBSTRUCTION OR GANGRENE: ICD-10-CM

## 2021-02-16 DIAGNOSIS — D12.3 BENIGN NEOPLASM OF TRANSVERSE COLON: ICD-10-CM

## 2021-02-16 DIAGNOSIS — K29.50 UNSPECIFIED CHRONIC GASTRITIS WITHOUT BLEEDING: ICD-10-CM

## 2021-02-16 DIAGNOSIS — K64.1 SECOND DEGREE HEMORRHOIDS: ICD-10-CM

## 2021-02-16 DIAGNOSIS — K63.89 OTHER SPECIFIED DISEASES OF INTESTINE: ICD-10-CM

## 2021-02-16 DIAGNOSIS — K52.9 NONINFECTIVE GASTROENTERITIS AND COLITIS, UNSPECIFIED: ICD-10-CM

## 2021-02-16 DIAGNOSIS — K22.2 ESOPHAGEAL OBSTRUCTION: ICD-10-CM

## 2021-02-16 PROBLEM — K63.5 POLYP OF COLON: Status: ACTIVE | Noted: 2021-02-16

## 2021-02-16 PROBLEM — K31.89 OTHER DISEASES OF STOMACH AND DUODENUM: Status: ACTIVE | Noted: 2021-02-16

## 2021-02-16 LAB
GI HISTOLOGY: A. SELECT: (no result)
GI HISTOLOGY: B. SELECT: (no result)
GI HISTOLOGY: C. UNSPECIFIED: (no result)
GI HISTOLOGY: D. SELECT: (no result)
GI HISTOLOGY: E. UNSPECIFIED: (no result)
GI HISTOLOGY: PDF REPORT: (no result)

## 2021-02-16 PROCEDURE — 45380 COLONOSCOPY AND BIOPSY: CPT | Mod: 59 | Performed by: INTERNAL MEDICINE

## 2021-02-16 PROCEDURE — 43239 EGD BIOPSY SINGLE/MULTIPLE: CPT | Performed by: INTERNAL MEDICINE

## 2021-02-16 PROCEDURE — 88342 IMHCHEM/IMCYTCHM 1ST ANTB: CPT | Performed by: INTERNAL MEDICINE

## 2021-02-16 PROCEDURE — 45385 COLONOSCOPY W/LESION REMOVAL: CPT | Performed by: INTERNAL MEDICINE

## 2021-02-16 PROCEDURE — 88305 TISSUE EXAM BY PATHOLOGIST: CPT | Performed by: INTERNAL MEDICINE

## 2021-02-24 ENCOUNTER — OFFICE (AMBULATORY)
Dept: URBAN - METROPOLITAN AREA CLINIC 64 | Facility: CLINIC | Age: 41
End: 2021-02-24

## 2021-02-24 VITALS
WEIGHT: 261 LBS | HEART RATE: 98 BPM | HEIGHT: 64 IN | DIASTOLIC BLOOD PRESSURE: 86 MMHG | SYSTOLIC BLOOD PRESSURE: 140 MMHG

## 2021-02-24 DIAGNOSIS — R11.0 NAUSEA: ICD-10-CM

## 2021-02-24 DIAGNOSIS — Z86.010 PERSONAL HISTORY OF COLONIC POLYPS: ICD-10-CM

## 2021-02-24 DIAGNOSIS — K21.9 GASTRO-ESOPHAGEAL REFLUX DISEASE WITHOUT ESOPHAGITIS: ICD-10-CM

## 2021-02-24 DIAGNOSIS — L29.9 PRURITUS, UNSPECIFIED: ICD-10-CM

## 2021-02-24 DIAGNOSIS — K80.20 CALCULUS OF GALLBLADDER WITHOUT CHOLECYSTITIS WITHOUT OBSTRU: ICD-10-CM

## 2021-02-24 PROCEDURE — 99214 OFFICE O/P EST MOD 30 MIN: CPT | Performed by: INTERNAL MEDICINE

## 2021-02-24 RX ORDER — URSODIOL 500 MG/1
1000 TABLET ORAL
Qty: 60 | Refills: 11 | Status: COMPLETED
Start: 2021-02-24 | End: 2023-10-19

## 2021-03-15 ENCOUNTER — LAB (OUTPATIENT)
Dept: FAMILY MEDICINE CLINIC | Facility: CLINIC | Age: 41
End: 2021-03-15

## 2021-03-15 DIAGNOSIS — K74.60 HEPATIC CIRRHOSIS, UNSPECIFIED HEPATIC CIRRHOSIS TYPE, UNSPECIFIED WHETHER ASCITES PRESENT (HCC): ICD-10-CM

## 2021-03-15 PROCEDURE — 80053 COMPREHEN METABOLIC PANEL: CPT | Performed by: NURSE PRACTITIONER

## 2021-03-15 PROCEDURE — 36415 COLL VENOUS BLD VENIPUNCTURE: CPT | Performed by: NURSE PRACTITIONER

## 2021-03-16 LAB
ALBUMIN SERPL-MCNC: 4.1 G/DL (ref 3.5–5.2)
ALBUMIN/GLOB SERPL: 1.3 G/DL
ALP SERPL-CCNC: 149 U/L (ref 39–117)
ALT SERPL W P-5'-P-CCNC: 20 U/L (ref 1–41)
ANION GAP SERPL CALCULATED.3IONS-SCNC: 11.4 MMOL/L (ref 5–15)
AST SERPL-CCNC: 32 U/L (ref 1–40)
BILIRUB SERPL-MCNC: 0.3 MG/DL (ref 0–1.2)
BUN SERPL-MCNC: 3 MG/DL (ref 6–20)
BUN/CREAT SERPL: 5.4 (ref 7–25)
CALCIUM SPEC-SCNC: 8.5 MG/DL (ref 8.6–10.5)
CHLORIDE SERPL-SCNC: 99 MMOL/L (ref 98–107)
CO2 SERPL-SCNC: 25.6 MMOL/L (ref 22–29)
CREAT SERPL-MCNC: 0.56 MG/DL (ref 0.76–1.27)
GFR SERPL CREATININE-BSD FRML MDRD: >150 ML/MIN/1.73
GLOBULIN UR ELPH-MCNC: 3.2 GM/DL
GLUCOSE SERPL-MCNC: 119 MG/DL (ref 65–99)
POTASSIUM SERPL-SCNC: 4 MMOL/L (ref 3.5–5.2)
PROT SERPL-MCNC: 7.3 G/DL (ref 6–8.5)
SODIUM SERPL-SCNC: 136 MMOL/L (ref 136–145)

## 2021-03-22 ENCOUNTER — OFFICE VISIT (OUTPATIENT)
Dept: FAMILY MEDICINE CLINIC | Facility: CLINIC | Age: 41
End: 2021-03-22

## 2021-03-22 VITALS
DIASTOLIC BLOOD PRESSURE: 79 MMHG | HEART RATE: 98 BPM | BODY MASS INDEX: 44.56 KG/M2 | TEMPERATURE: 97.5 F | OXYGEN SATURATION: 97 % | SYSTOLIC BLOOD PRESSURE: 126 MMHG | WEIGHT: 261 LBS | HEIGHT: 64 IN

## 2021-03-22 DIAGNOSIS — I10 HYPERTENSION, UNSPECIFIED TYPE: ICD-10-CM

## 2021-03-22 DIAGNOSIS — E78.2 MIXED HYPERLIPIDEMIA: ICD-10-CM

## 2021-03-22 DIAGNOSIS — Z86.010 PERSONAL HISTORY OF COLONIC POLYPS: Primary | ICD-10-CM

## 2021-03-22 DIAGNOSIS — M1A.09X0 CHRONIC GOUT OF MULTIPLE SITES, UNSPECIFIED CAUSE: ICD-10-CM

## 2021-03-22 DIAGNOSIS — K74.60 HEPATIC CIRRHOSIS, UNSPECIFIED HEPATIC CIRRHOSIS TYPE, UNSPECIFIED WHETHER ASCITES PRESENT (HCC): ICD-10-CM

## 2021-03-22 PROCEDURE — 99214 OFFICE O/P EST MOD 30 MIN: CPT | Performed by: NURSE PRACTITIONER

## 2021-03-22 RX ORDER — URSODIOL 300 MG/1
300 CAPSULE ORAL 2 TIMES DAILY
COMMUNITY
End: 2021-03-22

## 2021-03-22 RX ORDER — URSODIOL 500 MG/1
TABLET, FILM COATED ORAL
COMMUNITY
Start: 2021-02-24 | End: 2022-07-11 | Stop reason: SDUPTHER

## 2021-03-22 RX ORDER — OMEPRAZOLE 40 MG/1
40 CAPSULE, DELAYED RELEASE ORAL DAILY
Qty: 90 CAPSULE | Refills: 1 | Status: SHIPPED | OUTPATIENT
Start: 2021-03-22 | End: 2021-06-23 | Stop reason: SDUPTHER

## 2021-03-22 RX ORDER — LISINOPRIL 10 MG/1
10 TABLET ORAL DAILY
Qty: 90 TABLET | Refills: 1 | Status: SHIPPED | OUTPATIENT
Start: 2021-03-22 | End: 2021-06-23 | Stop reason: SDUPTHER

## 2021-03-22 NOTE — PROGRESS NOTES
"Chief Complaint  Follow-up (3 months follow up to discuss lab results )    Subjective          Farhad Khan presents to [facility]  History of Present Illness     Abdominal pain, mostly LUQ now, EGD/C-scope per Dr. Raines, pt w/ fatty liver, he denies alcohol use, liver u/s showed cirrhosis and stone in gallbladder neck. Pt reports Nausea, diarrhea, bloating of abdomen after eating has improved, Pt reports fam hx, sister/mom with gallbladder dz and james. Denies back pain, blood or mucus in stool, BM's have returned to near normal, trying to find a fiber supplement he can tolerate. Pt is now seeing Dr. Raines or [facility] q3mo    Elevated LFT's, fatty liver, working on weight loss and lifestyle mods-down 7 lbs     Gout, following low purine diet, stopped allopurinol daily, stable, uses celebrex only prn.      HTN, stable on meds and takes as directed, denies chest pain, headache, shortness of air, palpitations and swelling of extremities.       Objective   Vital Signs:   /79 (BP Location: Left arm, Patient Position: Sitting, Cuff Size: Adult)   Pulse 98   Temp 97.5 °F (36.4 °C) (Skin)   Ht 162.6 cm (64\")   Wt 118 kg (261 lb)   SpO2 97%   BMI 44.80 kg/m²       Physical Exam  Vitals and nursing note reviewed.   Constitutional:       General: He is not in acute distress.     Appearance: He is well-developed. He is not diaphoretic.   HENT:      Head: Normocephalic and atraumatic.   Eyes:      Pupils: Pupils are equal, round, and reactive to light.   Neck:      Thyroid: No thyromegaly.   Cardiovascular:      Rate and Rhythm: Normal rate and regular rhythm.      Heart sounds: Normal heart sounds. No murmur heard.     Pulmonary:      Effort: Pulmonary effort is normal. No respiratory distress.      Breath sounds: Normal breath sounds.   Abdominal:      General: Bowel sounds are normal. There is no distension.      Palpations: Abdomen is soft.      Tenderness: There is no " abdominal tenderness.   Musculoskeletal:         General: Normal range of motion.      Cervical back: Normal range of motion.   Skin:     General: Skin is warm and dry.      Findings: No erythema.   Neurological:      Mental Status: He is alert and oriented to person, place, and time.   Psychiatric:         Behavior: Behavior normal.         Thought Content: Thought content normal.         Judgment: Judgment normal.        Result Review :                 Assessment and Plan    Diagnoses and all orders for this visit:    1. Personal history of colonic polyps (Primary)  Comments:  repeat colonoscopy 3-5 yrs as directed per [facility]    2. Hepatic cirrhosis, unspecified hepatic cirrhosis type, unspecified whether ascites present (CMS/HCC)  Comments:  enzymes improved after d/c statin. cont to monitor, recheck all again in 3mo. cont to f/u with Dr. Raines as directed.     3. Mixed hyperlipidemia  Comments:  LFT's imprved after d/c statin. recheck lipids 3mi. praised effort of HHD, weight loss.    4. Chronic gout of multiple sites, unspecified cause    5. Hypertension, unspecified type  Comments:  stable    Other orders  -     omeprazole (priLOSEC) 40 MG capsule; Take 1 capsule by mouth Daily.  Dispense: 90 capsule; Refill: 1  -     lisinopril (PRINIVIL,ZESTRIL) 10 MG tablet; Take 1 tablet by mouth Daily.  Dispense: 90 tablet; Refill: 1      I spent 25 minutes caring for Farhad on this date of service. This time includes time spent by me in the following activities:preparing for the visit, reviewing tests, performing a medically appropriate examination and/or evaluation , counseling and educating the patient/family/caregiver and ordering medications, tests, or procedures     Follow Up   Return in about 3 months (around 6/22/2021), or abd pain,, for abd pain, GERD, HTN, HTN panel and HGBA1c, hep c ab 1 week prior to appt.  Patient was given instructions and counseling regarding his condition or for health maintenance  advice. Please see specific information pulled into the AVS if appropriate.

## 2021-04-08 RX ORDER — OMEPRAZOLE 40 MG/1
CAPSULE, DELAYED RELEASE ORAL
Qty: 90 CAPSULE | Refills: 0 | OUTPATIENT
Start: 2021-04-08

## 2021-05-05 RX ORDER — ALLOPURINOL 100 MG/1
TABLET ORAL
Qty: 90 TABLET | Refills: 0 | OUTPATIENT
Start: 2021-05-05

## 2021-05-05 RX ORDER — LISINOPRIL 10 MG/1
TABLET ORAL
Qty: 90 TABLET | Refills: 0 | OUTPATIENT
Start: 2021-05-05

## 2021-06-23 ENCOUNTER — OFFICE VISIT (OUTPATIENT)
Dept: FAMILY MEDICINE CLINIC | Facility: CLINIC | Age: 41
End: 2021-06-23

## 2021-06-23 VITALS
DIASTOLIC BLOOD PRESSURE: 72 MMHG | HEIGHT: 64 IN | OXYGEN SATURATION: 98 % | SYSTOLIC BLOOD PRESSURE: 128 MMHG | HEART RATE: 83 BPM | BODY MASS INDEX: 44.73 KG/M2 | WEIGHT: 262 LBS

## 2021-06-23 DIAGNOSIS — K58.2 IRRITABLE BOWEL SYNDROME WITH BOTH CONSTIPATION AND DIARRHEA: Primary | ICD-10-CM

## 2021-06-23 DIAGNOSIS — K74.60 HEPATIC CIRRHOSIS, UNSPECIFIED HEPATIC CIRRHOSIS TYPE, UNSPECIFIED WHETHER ASCITES PRESENT (HCC): ICD-10-CM

## 2021-06-23 DIAGNOSIS — M10.9 GOUTY ARTHRITIS: ICD-10-CM

## 2021-06-23 DIAGNOSIS — I10 HYPERTENSION, UNSPECIFIED TYPE: ICD-10-CM

## 2021-06-23 DIAGNOSIS — R73.09 ABNORMAL GLUCOSE: ICD-10-CM

## 2021-06-23 DIAGNOSIS — E78.2 MIXED HYPERLIPIDEMIA: ICD-10-CM

## 2021-06-23 PROCEDURE — 80053 COMPREHEN METABOLIC PANEL: CPT | Performed by: NURSE PRACTITIONER

## 2021-06-23 PROCEDURE — 36415 COLL VENOUS BLD VENIPUNCTURE: CPT | Performed by: NURSE PRACTITIONER

## 2021-06-23 PROCEDURE — 84443 ASSAY THYROID STIM HORMONE: CPT | Performed by: NURSE PRACTITIONER

## 2021-06-23 PROCEDURE — 85027 COMPLETE CBC AUTOMATED: CPT | Performed by: NURSE PRACTITIONER

## 2021-06-23 PROCEDURE — 83036 HEMOGLOBIN GLYCOSYLATED A1C: CPT | Performed by: NURSE PRACTITIONER

## 2021-06-23 PROCEDURE — 99214 OFFICE O/P EST MOD 30 MIN: CPT | Performed by: NURSE PRACTITIONER

## 2021-06-23 PROCEDURE — 80061 LIPID PANEL: CPT | Performed by: NURSE PRACTITIONER

## 2021-06-23 RX ORDER — OMEPRAZOLE 40 MG/1
40 CAPSULE, DELAYED RELEASE ORAL DAILY
Qty: 90 CAPSULE | Refills: 1 | Status: SHIPPED | OUTPATIENT
Start: 2021-06-23 | End: 2022-07-11

## 2021-06-23 RX ORDER — LISINOPRIL 10 MG/1
10 TABLET ORAL DAILY
Qty: 90 TABLET | Refills: 1 | Status: SHIPPED | OUTPATIENT
Start: 2021-06-23 | End: 2022-08-04 | Stop reason: SDUPTHER

## 2021-06-23 NOTE — PROGRESS NOTES
Chief Complaint  Hypertension, Heartburn, Abdominal Pain, and Follow-up (3 mo)    Subjective          Farhad Khan presents to [facility] for   History of Present Illness     HTN, stable on meds and takes as directed, denies chest pain, headache, shortness of air, palpitations and swelling of extremities.     Abdominal pain, LUQ, dependent on diet, with alternating diarrhea/constipation, Pt reports Nausea, bloating after eating triggering foods. Denies back pain, blood or mucus in stool. Currently on miralax and fiber supplement, ate poorly on vacation last week.   - He reports a fam hx, sister/mom with gallbladder dz and james.   - EGD/C-scope per Dr. Raines, pt w/ fatty liver, he denies alcohol use.   - liver u/s showed cirrhosis and stone in gallbladder neck.  - following with Dr. Raines or [facility] q3mo, needs to schedule appt     Pt did not complete the blood work ordered prior to this visit.       Gout, following low purine diet, stopped allopurinol daily, stable, uses celebrex only prn.        The following portions of the patient's history were reviewed and updated as appropriate: allergies, current medications, past family history, past medical history, past social history, past surgical history and problem list.    Past Medical History:   Diagnosis Date   • B12 deficiency    • Chronic fatigue    • Dizziness    • Elevated LFTs    • Fatty liver    • GERD (gastroesophageal reflux disease)    • Gout    • Headache    • HSV infection    • Hyperglycemia    • Hyperlipidemia    • Hypertension    • Obesity    • Obstructive sleep apnea    • Onychomycosis    • Pain in joint, multiple sites    • Sebaceous cyst    • Skin nodule    • Snoring    • Vision changes      History reviewed. No pertinent surgical history.  Family History   Problem Relation Age of Onset   • Sleep apnea Father    • Hypertension Other    • Rheum arthritis Other      Social History     Tobacco Use   • Smoking  "status: Never Smoker   • Smokeless tobacco: Never Used   Substance Use Topics   • Alcohol use: No       Current Outpatient Medications:   •  lisinopril (PRINIVIL,ZESTRIL) 10 MG tablet, Take 1 tablet by mouth Daily., Disp: 90 tablet, Rfl: 1  •  omeprazole (priLOSEC) 40 MG capsule, Take 1 capsule by mouth Daily., Disp: 90 capsule, Rfl: 1  •  ursodiol (ACTIGALL) 500 MG tablet, , Disp: , Rfl:   •  VITAMIN E PO, Take  by mouth., Disp: , Rfl:   •  dicyclomine (BENTYL) 20 MG tablet, TAKE ONE TABLET BY MOUTH THREE TIMES A DAY AS NEEDED FOR BLOATING AND CRAMPING WITH A MEAL, Disp: 90 tablet, Rfl: 2    Objective   Vital Signs:   /72 (BP Location: Right arm, Patient Position: Sitting, Cuff Size: Large Adult)   Pulse 83   Ht 162.6 cm (64.02\")   Wt 119 kg (262 lb)   SpO2 98%   BMI 44.95 kg/m²       Physical Exam  Vitals and nursing note reviewed.   Constitutional:       General: He is not in acute distress.     Appearance: He is well-developed. He is not diaphoretic.   HENT:      Head: Normocephalic and atraumatic.   Eyes:      Pupils: Pupils are equal, round, and reactive to light.   Neck:      Thyroid: No thyromegaly.   Cardiovascular:      Rate and Rhythm: Normal rate and regular rhythm.      Heart sounds: Normal heart sounds. No murmur heard.     Pulmonary:      Effort: Pulmonary effort is normal. No respiratory distress.      Breath sounds: Normal breath sounds.   Abdominal:      General: Bowel sounds are normal. There is no distension.      Palpations: Abdomen is soft.      Tenderness: There is no abdominal tenderness.      Comments: Hyperactive BS x4q   Musculoskeletal:         General: Normal range of motion.      Cervical back: Normal range of motion.   Skin:     General: Skin is warm and dry.      Findings: No erythema.   Neurological:      Mental Status: He is alert and oriented to person, place, and time.   Psychiatric:         Behavior: Behavior normal.         Thought Content: Thought content normal.    "      Judgment: Judgment normal.          Result Review :     Office Visit on 06/23/2021   Component Date Value Ref Range Status   • Total Cholesterol 06/23/2021 201* 0 - 200 mg/dL Final   • Triglycerides 06/23/2021 311* 0 - 150 mg/dL Final   • HDL Cholesterol 06/23/2021 22* 40 - 60 mg/dL Final   • LDL Cholesterol  06/23/2021 124* 0 - 100 mg/dL Final   • VLDL Cholesterol 06/23/2021 55* 5 - 40 mg/dL Final   • LDL/HDL Ratio 06/23/2021 5.31   Final   • Glucose 06/23/2021 90  65 - 99 mg/dL Final   • BUN 06/23/2021 4* 6 - 20 mg/dL Final   • Creatinine 06/23/2021 0.58* 0.76 - 1.27 mg/dL Final   • Sodium 06/23/2021 136  136 - 145 mmol/L Final   • Potassium 06/23/2021 3.8  3.5 - 5.2 mmol/L Final   • Chloride 06/23/2021 100  98 - 107 mmol/L Final   • CO2 06/23/2021 23.7  22.0 - 29.0 mmol/L Final   • Calcium 06/23/2021 8.2* 8.6 - 10.5 mg/dL Final   • Total Protein 06/23/2021 6.8  6.0 - 8.5 g/dL Final   • Albumin 06/23/2021 3.80  3.50 - 5.20 g/dL Final   • ALT (SGPT) 06/23/2021 19  1 - 41 U/L Final   • AST (SGOT) 06/23/2021 42* 1 - 40 U/L Final   • Alkaline Phosphatase 06/23/2021 131* 39 - 117 U/L Final   • Total Bilirubin 06/23/2021 0.7  0.0 - 1.2 mg/dL Final   • eGFR Non  Amer 06/23/2021 >150  >60 mL/min/1.73 Final   • Globulin 06/23/2021 3.0  gm/dL Final   • A/G Ratio 06/23/2021 1.3  g/dL Final   • BUN/Creatinine Ratio 06/23/2021 6.9* 7.0 - 25.0 Final   • Anion Gap 06/23/2021 12.3  5.0 - 15.0 mmol/L Final   • WBC 06/23/2021 3.27* 3.40 - 10.80 10*3/mm3 Final   • RBC 06/23/2021 4.93  4.14 - 5.80 10*6/mm3 Final   • Hemoglobin 06/23/2021 14.6  13.0 - 17.7 g/dL Final   • Hematocrit 06/23/2021 42.2  37.5 - 51.0 % Final   • MCV 06/23/2021 85.6  79.0 - 97.0 fL Final   • MCH 06/23/2021 29.6  26.6 - 33.0 pg Final   • MCHC 06/23/2021 34.6  31.5 - 35.7 g/dL Final   • RDW 06/23/2021 13.5  12.3 - 15.4 % Final   • RDW-SD 06/23/2021 42.4  37.0 - 54.0 fl Final   • MPV 06/23/2021 11.4  6.0 - 12.0 fL Final   • Platelets 06/23/2021 145   140 - 450 10*3/mm3 Final   • TSH 06/23/2021 2.170  0.270 - 4.200 uIU/mL Final   • Hemoglobin A1C 06/23/2021 5.5  3.5 - 5.6 % Final                       Assessment and Plan    Diagnoses and all orders for this visit:    1. Irritable bowel syndrome with both constipation and diarrhea (Primary)  Comments:  improve and watch diet, remove triggers, cont miralax and fiber prn. bentyl prn as well.     2. Hepatic cirrhosis, unspecified hepatic cirrhosis type, unspecified whether ascites present (CMS/HCC)  Comments:  following with [facility], pt to call and sched appt.     3. Gouty arthritis    4. Mixed hyperlipidemia  -     Lipid Panel  -     Comprehensive Metabolic Panel  -     CBC (No Diff)  -     TSH    5. Abnormal glucose  -     Hemoglobin A1c    6. Hypertension, unspecified type  Comments:  stable, cont lisinopril.     Other orders  -     lisinopril (PRINIVIL,ZESTRIL) 10 MG tablet; Take 1 tablet by mouth Daily.  Dispense: 90 tablet; Refill: 1  -     omeprazole (priLOSEC) 40 MG capsule; Take 1 capsule by mouth Daily.  Dispense: 90 capsule; Refill: 1      Conditions mostly stable, rf meds as above  Collect labs today, pt is fasting.       I spent 30 minutes caring for Farhad Khan on this date of service. This time includes time spent by me in the following activities: preparing for the visit, reviewing tests, performing a medically appropriate examination and/or evaluation , counseling and educating the patient/family/caregiver, ordering medications, tests, or procedures and documenting information in the medical record        Follow Up     Return in about 3 months (around 9/23/2021) for IBS, liver disease, HTN, GERD. .  Patient was given instructions and counseling regarding his condition or for health maintenance advice. Please see specific information pulled into the AVS if appropriate.      EMR Dragon transcription disclaimer:  Some of this encounter note is an electronic transcription translation of spoken  language to printed text. The electronic translation of spoken language may permit erroneous, or at times, nonsensical words or phrases to be inadvertently transcribed; Although I have reviewed the note for such errors some may still exist.

## 2021-06-24 LAB
ALBUMIN SERPL-MCNC: 3.8 G/DL (ref 3.5–5.2)
ALBUMIN/GLOB SERPL: 1.3 G/DL
ALP SERPL-CCNC: 131 U/L (ref 39–117)
ALT SERPL W P-5'-P-CCNC: 19 U/L (ref 1–41)
ANION GAP SERPL CALCULATED.3IONS-SCNC: 12.3 MMOL/L (ref 5–15)
AST SERPL-CCNC: 42 U/L (ref 1–40)
BILIRUB SERPL-MCNC: 0.7 MG/DL (ref 0–1.2)
BUN SERPL-MCNC: 4 MG/DL (ref 6–20)
BUN/CREAT SERPL: 6.9 (ref 7–25)
CALCIUM SPEC-SCNC: 8.2 MG/DL (ref 8.6–10.5)
CHLORIDE SERPL-SCNC: 100 MMOL/L (ref 98–107)
CHOLEST SERPL-MCNC: 201 MG/DL (ref 0–200)
CO2 SERPL-SCNC: 23.7 MMOL/L (ref 22–29)
CREAT SERPL-MCNC: 0.58 MG/DL (ref 0.76–1.27)
DEPRECATED RDW RBC AUTO: 42.4 FL (ref 37–54)
ERYTHROCYTE [DISTWIDTH] IN BLOOD BY AUTOMATED COUNT: 13.5 % (ref 12.3–15.4)
GFR SERPL CREATININE-BSD FRML MDRD: >150 ML/MIN/1.73
GLOBULIN UR ELPH-MCNC: 3 GM/DL
GLUCOSE SERPL-MCNC: 90 MG/DL (ref 65–99)
HBA1C MFR BLD: 5.5 % (ref 3.5–5.6)
HCT VFR BLD AUTO: 42.2 % (ref 37.5–51)
HDLC SERPL-MCNC: 22 MG/DL (ref 40–60)
HGB BLD-MCNC: 14.6 G/DL (ref 13–17.7)
LDLC SERPL CALC-MCNC: 124 MG/DL (ref 0–100)
LDLC/HDLC SERPL: 5.31 {RATIO}
MCH RBC QN AUTO: 29.6 PG (ref 26.6–33)
MCHC RBC AUTO-ENTMCNC: 34.6 G/DL (ref 31.5–35.7)
MCV RBC AUTO: 85.6 FL (ref 79–97)
PLATELET # BLD AUTO: 145 10*3/MM3 (ref 140–450)
PMV BLD AUTO: 11.4 FL (ref 6–12)
POTASSIUM SERPL-SCNC: 3.8 MMOL/L (ref 3.5–5.2)
PROT SERPL-MCNC: 6.8 G/DL (ref 6–8.5)
RBC # BLD AUTO: 4.93 10*6/MM3 (ref 4.14–5.8)
SODIUM SERPL-SCNC: 136 MMOL/L (ref 136–145)
TRIGL SERPL-MCNC: 311 MG/DL (ref 0–150)
TSH SERPL DL<=0.05 MIU/L-ACNC: 2.17 UIU/ML (ref 0.27–4.2)
VLDLC SERPL-MCNC: 55 MG/DL (ref 5–40)
WBC # BLD AUTO: 3.27 10*3/MM3 (ref 3.4–10.8)

## 2021-06-30 RX ORDER — FENOFIBRATE 145 MG/1
145 TABLET, COATED ORAL DAILY
Qty: 90 TABLET | Refills: 1 | Status: SHIPPED | OUTPATIENT
Start: 2021-06-30 | End: 2022-02-08

## 2021-09-16 ENCOUNTER — LAB (OUTPATIENT)
Dept: FAMILY MEDICINE CLINIC | Facility: CLINIC | Age: 41
End: 2021-09-16

## 2021-09-16 DIAGNOSIS — Z00.00 PREVENTATIVE HEALTH CARE: Primary | ICD-10-CM

## 2021-09-16 DIAGNOSIS — I10 HYPERTENSION, UNSPECIFIED TYPE: ICD-10-CM

## 2021-09-16 DIAGNOSIS — E78.2 MIXED HYPERLIPIDEMIA: ICD-10-CM

## 2021-09-16 PROCEDURE — 86803 HEPATITIS C AB TEST: CPT | Performed by: NURSE PRACTITIONER

## 2021-09-16 PROCEDURE — 85027 COMPLETE CBC AUTOMATED: CPT | Performed by: NURSE PRACTITIONER

## 2021-09-16 PROCEDURE — 80061 LIPID PANEL: CPT | Performed by: NURSE PRACTITIONER

## 2021-09-16 PROCEDURE — 36415 COLL VENOUS BLD VENIPUNCTURE: CPT | Performed by: NURSE PRACTITIONER

## 2021-09-16 PROCEDURE — 80053 COMPREHEN METABOLIC PANEL: CPT | Performed by: NURSE PRACTITIONER

## 2021-09-16 PROCEDURE — 84443 ASSAY THYROID STIM HORMONE: CPT | Performed by: NURSE PRACTITIONER

## 2021-09-17 LAB
ALBUMIN SERPL-MCNC: 4 G/DL (ref 3.5–5.2)
ALBUMIN/GLOB SERPL: 1.3 G/DL
ALP SERPL-CCNC: 70 U/L (ref 39–117)
ALT SERPL W P-5'-P-CCNC: 17 U/L (ref 1–41)
ANION GAP SERPL CALCULATED.3IONS-SCNC: 10.4 MMOL/L (ref 5–15)
AST SERPL-CCNC: 33 U/L (ref 1–40)
BILIRUB SERPL-MCNC: 0.9 MG/DL (ref 0–1.2)
BUN SERPL-MCNC: 5 MG/DL (ref 6–20)
BUN/CREAT SERPL: 6.3 (ref 7–25)
CALCIUM SPEC-SCNC: 9 MG/DL (ref 8.6–10.5)
CHLORIDE SERPL-SCNC: 101 MMOL/L (ref 98–107)
CHOLEST SERPL-MCNC: 244 MG/DL (ref 0–200)
CO2 SERPL-SCNC: 24.6 MMOL/L (ref 22–29)
CREAT SERPL-MCNC: 0.79 MG/DL (ref 0.76–1.27)
DEPRECATED RDW RBC AUTO: 45.2 FL (ref 37–54)
ERYTHROCYTE [DISTWIDTH] IN BLOOD BY AUTOMATED COUNT: 14.4 % (ref 12.3–15.4)
GFR SERPL CREATININE-BSD FRML MDRD: 107 ML/MIN/1.73
GLOBULIN UR ELPH-MCNC: 3.1 GM/DL
GLUCOSE SERPL-MCNC: 84 MG/DL (ref 65–99)
HCT VFR BLD AUTO: 41.6 % (ref 37.5–51)
HCV AB SER DONR QL: NORMAL
HDLC SERPL-MCNC: 23 MG/DL (ref 40–60)
HGB BLD-MCNC: 14.3 G/DL (ref 13–17.7)
LDLC SERPL CALC-MCNC: 181 MG/DL (ref 0–100)
LDLC/HDLC SERPL: 7.81 {RATIO}
MCH RBC QN AUTO: 29.5 PG (ref 26.6–33)
MCHC RBC AUTO-ENTMCNC: 34.4 G/DL (ref 31.5–35.7)
MCV RBC AUTO: 85.8 FL (ref 79–97)
PLATELET # BLD AUTO: 178 10*3/MM3 (ref 140–450)
PMV BLD AUTO: 11.4 FL (ref 6–12)
POTASSIUM SERPL-SCNC: 3.8 MMOL/L (ref 3.5–5.2)
PROT SERPL-MCNC: 7.1 G/DL (ref 6–8.5)
RBC # BLD AUTO: 4.85 10*6/MM3 (ref 4.14–5.8)
SODIUM SERPL-SCNC: 136 MMOL/L (ref 136–145)
TRIGL SERPL-MCNC: 207 MG/DL (ref 0–150)
TSH SERPL DL<=0.05 MIU/L-ACNC: 1.85 UIU/ML (ref 0.27–4.2)
VLDLC SERPL-MCNC: 40 MG/DL (ref 5–40)
WBC # BLD AUTO: 3.07 10*3/MM3 (ref 3.4–10.8)

## 2021-09-29 ENCOUNTER — TELEMEDICINE (OUTPATIENT)
Dept: FAMILY MEDICINE CLINIC | Facility: CLINIC | Age: 41
End: 2021-09-29

## 2021-09-29 DIAGNOSIS — K80.10 CALCULUS OF GALLBLADDER WITH CHRONIC CHOLECYSTITIS WITHOUT OBSTRUCTION: ICD-10-CM

## 2021-09-29 DIAGNOSIS — G25.81 RESTLESS LEG: ICD-10-CM

## 2021-09-29 DIAGNOSIS — K58.2 IRRITABLE BOWEL SYNDROME WITH BOTH CONSTIPATION AND DIARRHEA: ICD-10-CM

## 2021-09-29 DIAGNOSIS — E78.2 MIXED HYPERLIPIDEMIA: Primary | ICD-10-CM

## 2021-09-29 PROCEDURE — 99214 OFFICE O/P EST MOD 30 MIN: CPT | Performed by: NURSE PRACTITIONER

## 2021-09-29 RX ORDER — ROSUVASTATIN CALCIUM 20 MG/1
20 TABLET, COATED ORAL NIGHTLY
Qty: 90 TABLET | Refills: 0 | Status: SHIPPED | OUTPATIENT
Start: 2021-09-29 | End: 2022-01-12

## 2021-09-29 RX ORDER — PRAMIPEXOLE DIHYDROCHLORIDE 0.12 MG/1
0.12 TABLET ORAL NIGHTLY
Qty: 30 TABLET | Refills: 0 | Status: SHIPPED | OUTPATIENT
Start: 2021-09-29 | End: 2021-09-30

## 2021-09-30 RX ORDER — PRAMIPEXOLE DIHYDROCHLORIDE 0.12 MG/1
0.12 TABLET ORAL NIGHTLY
Qty: 90 TABLET | Refills: 0 | Status: SHIPPED | OUTPATIENT
Start: 2021-09-30 | End: 2022-01-12

## 2022-01-12 RX ORDER — ROSUVASTATIN CALCIUM 20 MG/1
20 TABLET, COATED ORAL NIGHTLY
Qty: 90 TABLET | Refills: 0 | Status: SHIPPED | OUTPATIENT
Start: 2022-01-12 | End: 2022-04-18

## 2022-01-12 RX ORDER — PRAMIPEXOLE DIHYDROCHLORIDE 0.12 MG/1
0.12 TABLET ORAL NIGHTLY
Qty: 90 TABLET | Refills: 0 | Status: SHIPPED | OUTPATIENT
Start: 2022-01-12 | End: 2022-04-18

## 2022-02-08 RX ORDER — FENOFIBRATE 145 MG/1
145 TABLET, COATED ORAL DAILY
Qty: 90 TABLET | Refills: 1 | Status: SHIPPED | OUTPATIENT
Start: 2022-02-08 | End: 2022-08-04 | Stop reason: SDUPTHER

## 2022-04-18 RX ORDER — PRAMIPEXOLE DIHYDROCHLORIDE 0.12 MG/1
0.12 TABLET ORAL NIGHTLY
Qty: 90 TABLET | Refills: 0 | Status: SHIPPED | OUTPATIENT
Start: 2022-04-18 | End: 2022-07-11

## 2022-04-18 RX ORDER — ROSUVASTATIN CALCIUM 20 MG/1
20 TABLET, COATED ORAL NIGHTLY
Qty: 90 TABLET | Refills: 0 | Status: SHIPPED | OUTPATIENT
Start: 2022-04-18 | End: 2022-07-11

## 2022-04-18 NOTE — TELEPHONE ENCOUNTER
Rx Refill Note  Requested Prescriptions     Pending Prescriptions Disp Refills   • rosuvastatin (CRESTOR) 20 MG tablet [Pharmacy Med Name: ROSUVASTATIN 20MG TABLETS] 90 tablet 0     Sig: TAKE 1 TABLET BY MOUTH EVERY NIGHT   • pramipexole (MIRAPEX) 0.125 MG tablet [Pharmacy Med Name: PRAMIPEXOLE 0.125MG TABLETS] 90 tablet 0     Sig: TAKE 1 TABLET BY MOUTH EVERY NIGHT FOR RESTLESS LEGS      Last office visit with prescribing clinician: 6/23/2021      Next office visit with prescribing clinician: Visit date not found            Karishma Ivey MA  04/18/22, 10:52 EDT

## 2022-04-19 NOTE — TELEPHONE ENCOUNTER
Called in attempt to set up appts for pt. No answer; left voicemail with details and to call office to schedule.

## 2022-07-11 RX ORDER — PRAMIPEXOLE DIHYDROCHLORIDE 0.12 MG/1
0.12 TABLET ORAL NIGHTLY
Qty: 90 TABLET | Refills: 0 | Status: SHIPPED | OUTPATIENT
Start: 2022-07-11 | End: 2022-08-04 | Stop reason: SDUPTHER

## 2022-07-11 RX ORDER — URSODIOL 500 MG/1
500 TABLET, FILM COATED ORAL 2 TIMES DAILY
Qty: 180 TABLET | Refills: 1 | Status: SHIPPED | OUTPATIENT
Start: 2022-07-11 | End: 2023-01-23

## 2022-07-11 RX ORDER — OMEPRAZOLE 40 MG/1
40 CAPSULE, DELAYED RELEASE ORAL DAILY
Qty: 90 CAPSULE | Refills: 0 | Status: SHIPPED | OUTPATIENT
Start: 2022-07-11 | End: 2022-08-04 | Stop reason: SDUPTHER

## 2022-07-11 RX ORDER — ROSUVASTATIN CALCIUM 20 MG/1
20 TABLET, COATED ORAL NIGHTLY
Qty: 90 TABLET | Refills: 0 | Status: SHIPPED | OUTPATIENT
Start: 2022-07-11 | End: 2022-08-04 | Stop reason: SDUPTHER

## 2022-08-04 ENCOUNTER — OFFICE VISIT (OUTPATIENT)
Dept: FAMILY MEDICINE CLINIC | Facility: CLINIC | Age: 42
End: 2022-08-04

## 2022-08-04 VITALS
HEART RATE: 81 BPM | DIASTOLIC BLOOD PRESSURE: 82 MMHG | SYSTOLIC BLOOD PRESSURE: 134 MMHG | OXYGEN SATURATION: 98 % | BODY MASS INDEX: 46.13 KG/M2 | WEIGHT: 270.2 LBS | HEIGHT: 64 IN

## 2022-08-04 DIAGNOSIS — R73.09 ABNORMAL GLUCOSE: ICD-10-CM

## 2022-08-04 DIAGNOSIS — R06.81 WITNESSED EPISODE OF APNEA: ICD-10-CM

## 2022-08-04 DIAGNOSIS — E78.2 MIXED HYPERLIPIDEMIA: Primary | ICD-10-CM

## 2022-08-04 DIAGNOSIS — G25.81 RESTLESS LEG: ICD-10-CM

## 2022-08-04 DIAGNOSIS — I10 HYPERTENSION, UNSPECIFIED TYPE: ICD-10-CM

## 2022-08-04 DIAGNOSIS — Z00.00 PREVENTATIVE HEALTH CARE: ICD-10-CM

## 2022-08-04 DIAGNOSIS — K58.2 IRRITABLE BOWEL SYNDROME WITH BOTH CONSTIPATION AND DIARRHEA: ICD-10-CM

## 2022-08-04 DIAGNOSIS — M1A.09X0 CHRONIC GOUT OF MULTIPLE SITES, UNSPECIFIED CAUSE: ICD-10-CM

## 2022-08-04 DIAGNOSIS — R53.83 FATIGUE, UNSPECIFIED TYPE: ICD-10-CM

## 2022-08-04 DIAGNOSIS — K80.10 CALCULUS OF GALLBLADDER WITH CHRONIC CHOLECYSTITIS WITHOUT OBSTRUCTION: ICD-10-CM

## 2022-08-04 DIAGNOSIS — R06.83 LOUD SNORING: ICD-10-CM

## 2022-08-04 LAB
DEPRECATED RDW RBC AUTO: 41.2 FL (ref 37–54)
ERYTHROCYTE [DISTWIDTH] IN BLOOD BY AUTOMATED COUNT: 13.5 % (ref 12.3–15.4)
HCT VFR BLD AUTO: 39.8 % (ref 37.5–51)
HGB BLD-MCNC: 13.7 G/DL (ref 13–17.7)
MCH RBC QN AUTO: 29.2 PG (ref 26.6–33)
MCHC RBC AUTO-ENTMCNC: 34.4 G/DL (ref 31.5–35.7)
MCV RBC AUTO: 84.9 FL (ref 79–97)
PLATELET # BLD AUTO: 140 10*3/MM3 (ref 140–450)
PMV BLD AUTO: 11.5 FL (ref 6–12)
RBC # BLD AUTO: 4.69 10*6/MM3 (ref 4.14–5.8)
WBC NRBC COR # BLD: 1.94 10*3/MM3 (ref 3.4–10.8)

## 2022-08-04 PROCEDURE — 80053 COMPREHEN METABOLIC PANEL: CPT | Performed by: NURSE PRACTITIONER

## 2022-08-04 PROCEDURE — 84550 ASSAY OF BLOOD/URIC ACID: CPT | Performed by: NURSE PRACTITIONER

## 2022-08-04 PROCEDURE — 80061 LIPID PANEL: CPT | Performed by: NURSE PRACTITIONER

## 2022-08-04 PROCEDURE — 36415 COLL VENOUS BLD VENIPUNCTURE: CPT | Performed by: NURSE PRACTITIONER

## 2022-08-04 PROCEDURE — 84443 ASSAY THYROID STIM HORMONE: CPT | Performed by: NURSE PRACTITIONER

## 2022-08-04 PROCEDURE — 99396 PREV VISIT EST AGE 40-64: CPT | Performed by: NURSE PRACTITIONER

## 2022-08-04 PROCEDURE — 83036 HEMOGLOBIN GLYCOSYLATED A1C: CPT | Performed by: NURSE PRACTITIONER

## 2022-08-04 PROCEDURE — 85027 COMPLETE CBC AUTOMATED: CPT | Performed by: NURSE PRACTITIONER

## 2022-08-04 RX ORDER — PRAMIPEXOLE DIHYDROCHLORIDE 0.12 MG/1
0.12 TABLET ORAL NIGHTLY
Qty: 90 TABLET | Refills: 1 | Status: SHIPPED | OUTPATIENT
Start: 2022-08-04 | End: 2023-01-23

## 2022-08-04 RX ORDER — OMEPRAZOLE 40 MG/1
40 CAPSULE, DELAYED RELEASE ORAL DAILY
Qty: 90 CAPSULE | Refills: 1 | Status: SHIPPED | OUTPATIENT
Start: 2022-08-04 | End: 2023-02-06 | Stop reason: SDUPTHER

## 2022-08-04 RX ORDER — FENOFIBRATE 145 MG/1
145 TABLET, COATED ORAL DAILY
Qty: 90 TABLET | Refills: 1 | Status: SHIPPED | OUTPATIENT
Start: 2022-08-04 | End: 2023-02-06 | Stop reason: SDUPTHER

## 2022-08-04 RX ORDER — LISINOPRIL 10 MG/1
10 TABLET ORAL DAILY
Qty: 90 TABLET | Refills: 1 | Status: SHIPPED | OUTPATIENT
Start: 2022-08-04 | End: 2023-01-23

## 2022-08-04 RX ORDER — ROSUVASTATIN CALCIUM 20 MG/1
20 TABLET, COATED ORAL NIGHTLY
Qty: 90 TABLET | Refills: 1 | Status: SHIPPED | OUTPATIENT
Start: 2022-08-04 | End: 2023-02-06 | Stop reason: SDUPTHER

## 2022-08-04 NOTE — PROGRESS NOTES
"Chief Complaint  Chief Complaint   Patient presents with   • Annual Exam   • Med Refill   • Insomnia     All the time   • Sleep Apnea     Didn't follow through with sleep study when last ordered, would like to discuss having that again.   • Gallbladder Attack     Side pain, broke into a cold sweat.           Subjective          Farhad Khan presents to [facility] for   History of Present Illness     Pt is here for annual exam     HTN, stable on meds and takes as directed, denies chest pain, headache, shortness of air, palpitations and swelling of extremities.     Hyperlipidemia, atorvastatin was discontinued in the past due to elevated liver enzymes but normalized September 2021, he was started on Crestor, recommended follow-up in 3 months for recheck but has not yet, he is taking Crestor and fenofibrate daily as directed. The patient denies muscle aches, constipation, diarrhea, GI upset, fatigue, chest pain/pressure, exercise intolerance, dyspnea, palpitations, syncope and pedal edema.      GERD, stable on medication, denies nausea, vomiting, constipation, he does c/o abdominal pain and frequent diarrhea after eating.    Irritable bowel syndrome, liver cirrhosis, elev LFT's but last labs liver enzymes had normalized. Following with gastro, ultrasound December 2020 showed gallstones without acute cholecystitis, c/o of 1 isolated episode of \"gallbladder attack\" HIDA scan ordered September 2021 but not completed.    Restless leg syndrome, stable on mirapex    Fatigue, snoring, wife has witnessed episodes of apnea, patient did not complete sleep study but would like reordered    He is overall doing well, reports their life has been extremely busy with adoption of 2 children, has a lot going on, he is trying to be more compliant with medications, diet and lifestyle.         The following portions of the patient's history were reviewed and updated as appropriate: allergies, current " medications, past family history, past medical history, past social history, past surgical history and problem list.    Past Medical History:   Diagnosis Date   • B12 deficiency    • Chronic fatigue    • Dizziness    • Elevated LFTs    • Fatty liver    • GERD (gastroesophageal reflux disease)    • Gout    • Headache    • HSV infection    • Hyperglycemia    • Hyperlipidemia    • Hypertension    • Obesity    • Obstructive sleep apnea    • Onychomycosis    • Pain in joint, multiple sites    • Sebaceous cyst    • Skin nodule    • Snoring    • Vision changes      History reviewed. No pertinent surgical history.  Family History   Problem Relation Age of Onset   • Sleep apnea Father    • Hypertension Other    • Rheum arthritis Other      Social History     Tobacco Use   • Smoking status: Never Smoker   • Smokeless tobacco: Never Used   Substance Use Topics   • Alcohol use: No       Current Outpatient Medications:   •  fenofibrate (TRICOR) 145 MG tablet, Take 1 tablet by mouth Daily., Disp: 90 tablet, Rfl: 1  •  lisinopril (PRINIVIL,ZESTRIL) 10 MG tablet, Take 1 tablet by mouth Daily., Disp: 90 tablet, Rfl: 1  •  omeprazole (priLOSEC) 40 MG capsule, Take 1 capsule by mouth Daily., Disp: 90 capsule, Rfl: 1  •  pramipexole (MIRAPEX) 0.125 MG tablet, Take 1 tablet by mouth Every Night. For restless legs, Disp: 90 tablet, Rfl: 1  •  rosuvastatin (CRESTOR) 20 MG tablet, Take 1 tablet by mouth Every Night., Disp: 90 tablet, Rfl: 1  •  ursodiol (ACTIGALL) 500 MG tablet, Take 1 tablet by mouth 2 (Two) Times a Day., Disp: 180 tablet, Rfl: 1  •  VITAMIN E PO, Take  by mouth., Disp: , Rfl:   •  Cyanocobalamin (VITAMIN B-12 PO), Take  by mouth., Disp: , Rfl:   •  dicyclomine (BENTYL) 20 MG tablet, TAKE ONE TABLET BY MOUTH THREE TIMES A DAY AS NEEDED FOR BLOATING AND CRAMPING WITH A MEAL, Disp: 90 tablet, Rfl: 2    Objective   Vital Signs:   /82 (BP Location: Right arm, Patient Position: Sitting, Cuff Size: Large Adult)   Pulse  "81   Ht 162.6 cm (64.02\")   Wt 123 kg (270 lb 3.2 oz)   SpO2 98%   BMI 46.36 kg/m²           Physical Exam  Vitals and nursing note reviewed.   Constitutional:       General: He is not in acute distress.     Appearance: He is well-developed. He is obese. He is not diaphoretic.   HENT:      Head: Normocephalic and atraumatic.   Eyes:      Pupils: Pupils are equal, round, and reactive to light.   Neck:      Thyroid: No thyromegaly.   Cardiovascular:      Rate and Rhythm: Normal rate and regular rhythm.      Heart sounds: Normal heart sounds. No murmur heard.  Pulmonary:      Effort: Pulmonary effort is normal. No respiratory distress.      Breath sounds: Normal breath sounds.   Abdominal:      General: Bowel sounds are normal. There is no distension.      Palpations: Abdomen is soft.      Tenderness: There is no abdominal tenderness.   Musculoskeletal:         General: Normal range of motion.      Cervical back: Normal range of motion.   Skin:     General: Skin is warm and dry.      Findings: No erythema.   Neurological:      Mental Status: He is alert and oriented to person, place, and time.   Psychiatric:         Behavior: Behavior normal.         Thought Content: Thought content normal.         Judgment: Judgment normal.          Result Review :     No visits with results within 7 Day(s) from this visit.   Latest known visit with results is:   Lab on 09/16/2021   Component Date Value Ref Range Status   • WBC 09/16/2021 3.07 (A) 3.40 - 10.80 10*3/mm3 Final   • RBC 09/16/2021 4.85  4.14 - 5.80 10*6/mm3 Final   • Hemoglobin 09/16/2021 14.3  13.0 - 17.7 g/dL Final   • Hematocrit 09/16/2021 41.6  37.5 - 51.0 % Final   • MCV 09/16/2021 85.8  79.0 - 97.0 fL Final   • MCH 09/16/2021 29.5  26.6 - 33.0 pg Final   • MCHC 09/16/2021 34.4  31.5 - 35.7 g/dL Final   • RDW 09/16/2021 14.4  12.3 - 15.4 % Final   • RDW-SD 09/16/2021 45.2  37.0 - 54.0 fl Final   • MPV 09/16/2021 11.4  6.0 - 12.0 fL Final   • Platelets 09/16/2021 " 178  140 - 450 10*3/mm3 Final   • Glucose 09/16/2021 84  65 - 99 mg/dL Final   • BUN 09/16/2021 5 (A) 6 - 20 mg/dL Final   • Creatinine 09/16/2021 0.79  0.76 - 1.27 mg/dL Final   • Sodium 09/16/2021 136  136 - 145 mmol/L Final   • Potassium 09/16/2021 3.8  3.5 - 5.2 mmol/L Final   • Chloride 09/16/2021 101  98 - 107 mmol/L Final   • CO2 09/16/2021 24.6  22.0 - 29.0 mmol/L Final   • Calcium 09/16/2021 9.0  8.6 - 10.5 mg/dL Final   • Total Protein 09/16/2021 7.1  6.0 - 8.5 g/dL Final   • Albumin 09/16/2021 4.00  3.50 - 5.20 g/dL Final   • ALT (SGPT) 09/16/2021 17  1 - 41 U/L Final   • AST (SGOT) 09/16/2021 33  1 - 40 U/L Final   • Alkaline Phosphatase 09/16/2021 70  39 - 117 U/L Final   • Total Bilirubin 09/16/2021 0.9  0.0 - 1.2 mg/dL Final   • eGFR Non  Amer 09/16/2021 107  >60 mL/min/1.73 Final   • Globulin 09/16/2021 3.1  gm/dL Final   • A/G Ratio 09/16/2021 1.3  g/dL Final   • BUN/Creatinine Ratio 09/16/2021 6.3 (A) 7.0 - 25.0 Final   • Anion Gap 09/16/2021 10.4  5.0 - 15.0 mmol/L Final   • Total Cholesterol 09/16/2021 244 (A) 0 - 200 mg/dL Final   • Triglycerides 09/16/2021 207 (A) 0 - 150 mg/dL Final   • HDL Cholesterol 09/16/2021 23 (A) 40 - 60 mg/dL Final   • LDL Cholesterol  09/16/2021 181 (A) 0 - 100 mg/dL Final   • VLDL Cholesterol 09/16/2021 40  5 - 40 mg/dL Final   • LDL/HDL Ratio 09/16/2021 7.81   Final   • TSH 09/16/2021 1.850  0.270 - 4.200 uIU/mL Final   • Hepatitis C Ab 09/16/2021 Non-Reactive  Non-Reactive Final                  Class 3 Severe Obesity (BMI >=40). Obesity-related health conditions include the following: hypertension, dyslipidemias and GERD. Obesity is unchanged. BMI is is above average; BMI management plan is completed. We discussed portion control and increasing exercise.           Assessment and Plan    Diagnoses and all orders for this visit:    1. Mixed hyperlipidemia (Primary)  -     Lipid Panel    2. Calculus of gallbladder with chronic cholecystitis without  obstruction    3. Restless leg    4. Irritable bowel syndrome with both constipation and diarrhea    5. Preventative health care    6. Chronic gout of multiple sites, unspecified cause    7. Hypertension, unspecified type  -     Comprehensive Metabolic Panel  -     CBC (No Diff)  -     TSH  -     Uric Acid    8. Loud snoring  -     Home Sleep Study; Future    9. Fatigue, unspecified type  -     Home Sleep Study; Future    10. Witnessed episode of apnea  -     Home Sleep Study; Future    11. Abnormal glucose  -     Hemoglobin A1c    Other orders  -     fenofibrate (TRICOR) 145 MG tablet; Take 1 tablet by mouth Daily.  Dispense: 90 tablet; Refill: 1  -     lisinopril (PRINIVIL,ZESTRIL) 10 MG tablet; Take 1 tablet by mouth Daily.  Dispense: 90 tablet; Refill: 1  -     omeprazole (priLOSEC) 40 MG capsule; Take 1 capsule by mouth Daily.  Dispense: 90 capsule; Refill: 1  -     rosuvastatin (CRESTOR) 20 MG tablet; Take 1 tablet by mouth Every Night.  Dispense: 90 tablet; Refill: 1  -     pramipexole (MIRAPEX) 0.125 MG tablet; Take 1 tablet by mouth Every Night. For restless legs  Dispense: 90 tablet; Refill: 1    1.  Overall doing well, conditions mostly stable  2.  Labs today as ordered above, will notify results  3.  Reordered home sleep study, will order CPAP if indicated  4.  Medications refilled as above  5.  We will cancel HIDA scan for now, if patient develops worsening or more frequent right upper quadrant/gallbladder symptoms will reorder testing at that time.  6.  Continue omeprazole and Actigall, follow-up with gastroenterology as directed  7.  Pt on Crestor and fenofibrate, will need to monitor liver enzymes closely  8.  Age appropriate preventative counseling provided, including healthy lifestyle modifications and exercise      I spent 30 minutes caring for Farhad Khan on this date of service. This time includes time spent by me in the following activities: preparing for the visit, reviewing tests,  performing a medically appropriate examination and/or evaluation , counseling and educating the patient/family/caregiver, ordering medications, tests, or procedures and documenting information in the medical record        Follow Up     Return in about 6 months (around 2/4/2023) for Recheck HTN, lipids, HTN panel prior to appt.  Patient was given instructions and counseling regarding his condition or for health maintenance advice. Please see specific information pulled into the AVS if appropriate.        Part of this note may be an electronic transcription/translation of spoken language to printed text using the Dragon Dictation System

## 2022-08-05 ENCOUNTER — TELEPHONE (OUTPATIENT)
Dept: FAMILY MEDICINE CLINIC | Facility: CLINIC | Age: 42
End: 2022-08-05

## 2022-08-05 LAB
ALBUMIN SERPL-MCNC: 3.8 G/DL (ref 3.5–5.2)
ALBUMIN/GLOB SERPL: 1.4 G/DL
ALP SERPL-CCNC: 73 U/L (ref 39–117)
ALT SERPL W P-5'-P-CCNC: 19 U/L (ref 1–41)
ANION GAP SERPL CALCULATED.3IONS-SCNC: 9 MMOL/L (ref 5–15)
AST SERPL-CCNC: 24 U/L (ref 1–40)
BILIRUB SERPL-MCNC: 0.6 MG/DL (ref 0–1.2)
BUN SERPL-MCNC: 4 MG/DL (ref 6–20)
BUN/CREAT SERPL: 4.9 (ref 7–25)
CALCIUM SPEC-SCNC: 8.7 MG/DL (ref 8.6–10.5)
CHLORIDE SERPL-SCNC: 102 MMOL/L (ref 98–107)
CHOLEST SERPL-MCNC: 138 MG/DL (ref 0–200)
CO2 SERPL-SCNC: 26 MMOL/L (ref 22–29)
CREAT SERPL-MCNC: 0.81 MG/DL (ref 0.76–1.27)
EGFRCR SERPLBLD CKD-EPI 2021: 111.5 ML/MIN/1.73
GLOBULIN UR ELPH-MCNC: 2.7 GM/DL
GLUCOSE SERPL-MCNC: 119 MG/DL (ref 65–99)
HBA1C MFR BLD: 5.7 % (ref 4.8–5.6)
HDLC SERPL-MCNC: 24 MG/DL (ref 40–60)
LDLC SERPL CALC-MCNC: 92 MG/DL (ref 0–100)
LDLC/HDLC SERPL: 3.74 {RATIO}
POTASSIUM SERPL-SCNC: 3.7 MMOL/L (ref 3.5–5.2)
PROT SERPL-MCNC: 6.5 G/DL (ref 6–8.5)
SODIUM SERPL-SCNC: 137 MMOL/L (ref 136–145)
TRIGL SERPL-MCNC: 121 MG/DL (ref 0–150)
TSH SERPL DL<=0.05 MIU/L-ACNC: 1.64 UIU/ML (ref 0.27–4.2)
URATE SERPL-MCNC: 4.1 MG/DL (ref 3.4–7)
VLDLC SERPL-MCNC: 22 MG/DL (ref 5–40)

## 2022-08-05 NOTE — TELEPHONE ENCOUNTER
Caller: LEONARD GILMORE    Relationship to patient: Emergency Contact    Best call back number: [phone]    Date of exposure: N/A    Date of positive COVID19 test: 8/5/22(HOME TEST)     COVID19 symptoms: VOMITING,ABDOMINAL PAIN    Date of initial quarantine: 8/5/22    Additional information or concerns: PATIENT'S WIFE STATES THAT HE WAS DIAGNOSED WITH COVID TODAY AND STARTED VOMITING AROUND NOON AND IS HAVING ABDOMINAL PAIN RANKING AT A 6 ON A SCALE OF 1-10.SHE IS CONCERNED BECAUSE YESTERDAY HIS LABS SHOWED THAT HIS WHITE BLOOD COUNT WAS LOW.SHE IS REQUESTING A CALLBACK FOR ANY ADVICE.    What is the patients preferred pharmacy: [id number] [address] AT [facility] & [place] - [phone] [facility] [phone]   [phone]

## 2022-08-05 NOTE — TELEPHONE ENCOUNTER
Suly from  [facility] called in this morning regarding patient and a critical WBC of 1.94. I see this reflected in his labs in his chart as well.

## 2022-08-08 NOTE — TELEPHONE ENCOUNTER
Pt is doing much better today.  I have him schedule for Wednesday for his repeat cbc.  Would that be ok even with testing positive on Friday?  His symptoms have resolved.

## 2022-08-08 NOTE — TELEPHONE ENCOUNTER
Can you please call rosanne and move labs (repeat cbc scheduled 8/10) to next week.  Having covid will affect the results.  Thank you!

## 2022-08-08 NOTE — PROGRESS NOTES
Please see juwan's message: Mobibe[employer] message sent to patient, pt needs to return next week for cbc WITH diff, does not have to be fasting. Thanks, can you please call to schedule?  Thanks!

## 2022-08-09 ENCOUNTER — TELEPHONE (OUTPATIENT)
Dept: FAMILY MEDICINE CLINIC | Facility: CLINIC | Age: 42
End: 2022-08-09

## 2022-08-09 NOTE — TELEPHONE ENCOUNTER
OKAY FOR HUB TO READ/SHARE:     I attempted to contact patient's wife Nila in regard to getting patient's labs rescheduled to next week from tomorrow because of covid concern. No answer; LVMTCB to get those rescheduled with the office. Please transfer her to the office to get them rescheduled. Thank you.

## 2022-08-23 ENCOUNTER — CLINICAL SUPPORT (OUTPATIENT)
Dept: FAMILY MEDICINE CLINIC | Facility: CLINIC | Age: 42
End: 2022-08-23

## 2022-08-23 DIAGNOSIS — D72.9 ABNORMAL WHITE BLOOD CELL: Primary | ICD-10-CM

## 2022-08-23 PROCEDURE — 85025 COMPLETE CBC W/AUTO DIFF WBC: CPT | Performed by: NURSE PRACTITIONER

## 2022-08-23 PROCEDURE — 36415 COLL VENOUS BLD VENIPUNCTURE: CPT | Performed by: NURSE PRACTITIONER

## 2022-08-24 LAB
BASOPHILS # BLD AUTO: 0 10*3/MM3 (ref 0–0.2)
BASOPHILS NFR BLD AUTO: 0 % (ref 0–1.5)
DEPRECATED RDW RBC AUTO: 41.8 FL (ref 37–54)
EOSINOPHIL # BLD AUTO: 0 10*3/MM3 (ref 0–0.4)
EOSINOPHIL NFR BLD AUTO: 0 % (ref 0.3–6.2)
ERYTHROCYTE [DISTWIDTH] IN BLOOD BY AUTOMATED COUNT: 13.3 % (ref 12.3–15.4)
HCT VFR BLD AUTO: 40.9 % (ref 37.5–51)
HGB BLD-MCNC: 13.7 G/DL (ref 13–17.7)
IMM GRANULOCYTES # BLD AUTO: 0.01 10*3/MM3 (ref 0–0.05)
IMM GRANULOCYTES NFR BLD AUTO: 0.4 % (ref 0–0.5)
LYMPHOCYTES # BLD AUTO: 0.84 10*3/MM3 (ref 0.7–3.1)
LYMPHOCYTES NFR BLD AUTO: 35 % (ref 19.6–45.3)
MCH RBC QN AUTO: 29 PG (ref 26.6–33)
MCHC RBC AUTO-ENTMCNC: 33.5 G/DL (ref 31.5–35.7)
MCV RBC AUTO: 86.7 FL (ref 79–97)
MONOCYTES # BLD AUTO: 0.29 10*3/MM3 (ref 0.1–0.9)
MONOCYTES NFR BLD AUTO: 12.1 % (ref 5–12)
NEUTROPHILS NFR BLD AUTO: 1.26 10*3/MM3 (ref 1.7–7)
NEUTROPHILS NFR BLD AUTO: 52.5 % (ref 42.7–76)
NRBC BLD AUTO-RTO: 0 /100 WBC (ref 0–0.2)
PLATELET # BLD AUTO: 130 10*3/MM3 (ref 140–450)
PMV BLD AUTO: 11.9 FL (ref 6–12)
RBC # BLD AUTO: 4.72 10*6/MM3 (ref 4.14–5.8)
WBC NRBC COR # BLD: 2.4 10*3/MM3 (ref 3.4–10.8)

## 2022-08-25 DIAGNOSIS — D70.9 NEUTROPENIA, UNSPECIFIED TYPE: ICD-10-CM

## 2022-08-25 DIAGNOSIS — D69.6 THROMBOCYTOPENIA: Primary | ICD-10-CM

## 2022-08-26 ENCOUNTER — TELEPHONE (OUTPATIENT)
Dept: ONCOLOGY | Facility: CLINIC | Age: 42
End: 2022-08-26

## 2022-08-29 ENCOUNTER — TELEPHONE (OUTPATIENT)
Dept: ONCOLOGY | Facility: CLINIC | Age: 42
End: 2022-08-29

## 2022-08-29 DIAGNOSIS — G47.33 OBSTRUCTIVE SLEEP APNEA: Primary | ICD-10-CM

## 2022-08-29 NOTE — TELEPHONE ENCOUNTER
Hub is instructed to read the documentation below to patient  ATTEMPTED TO CONTACT PATIENT REGARDING HEMATOLOGY REFERRAL. NO ANSWER.  LMV ASKING PATIENT TO CALL BACK.

## 2022-08-31 ENCOUNTER — TELEPHONE (OUTPATIENT)
Dept: ONCOLOGY | Facility: CLINIC | Age: 42
End: 2022-08-31

## 2022-09-14 NOTE — PROGRESS NOTES
"                     HEMATOLOGY ONCOLOGY OUTPATIENT CONSULTATION       Patient name: Farhad Khan  : 1980  MRN: [id number]  Primary Care Physician: Jacqueline Dwyer APRN  Referring Physician: Jacqueline Dwyer APRN  Reason For Consult:     Chief Complaint   Patient presents with   • Appointment     Thrombocytopenia  Neutropenia     HPI:   History of Present Illness:  Farhad Khan is 42 y.o. male who presented to the office on 09/15/22 for consultation regarding    9/15/2022: Mr. Khan was referred for the investigation of leukopenia, neutropenia and thrombocytopenia.  He was first noted to have moderate leukopenia approximately 1 year before this visit.  Over the course of several months the leukopenia became somewhat worse and at some point his total white cells were less than 2.0.  Approximately 3 weeks before this visit this had improved and the total white cell count was up to 2.4 and this was associated to moderate neutropenia.  On this basis he was referred.  He had no new symptoms.  For some time, perhaps as many as 3 years, he had been experiencing fatigue and general malaise.  He had not had any changes in his work and he was able to fulfill all his duties.  He had been afebrile and without weight loss.  For at least 2 or 3 years he had maintained a similar weight.  He had been without chest pains or cough.  And denied expectoration or hemoptysis.  No dysphagia.  He had had no abdominal pain and denied diarrhea or dysuria.  He had not experienced any peripheral edema.  No skin rash.  For approximately 2 years he has been taking a combination of medications that included lisinopril, omeprazole and rosuvastatin.    Subjective:  • 09/15/22.  In the office for the first time.  Reports fatigue and loss of his sense of wellbeing.  \"I have no energy\".  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  He eats well and has maintained a stable weight.  His wife notes that his appetite is not " as good as to explain his excessive weight.  Denies chest pains and has had no more dyspnea than usual.  Has had dysphagia in the past but was prescribed omeprazole with which it resolved.  No abdominal pain and no diarrhea.  No dysuria.  No peripheral edema.    The following portions of the patient's history were reviewed and updated as appropriate: allergies, current medications, past family history, past medical history, past social history, past surgical history and problem list.    Past Medical History:   Diagnosis Date   • B12 deficiency    • Chronic fatigue    • Dizziness    • Elevated LFTs    • Fatty liver     NON ALCOHOLIC   • GERD (gastroesophageal reflux disease)    • Gout    • Headache    • HSV infection    • Hyperglycemia    • Hyperlipidemia    • Hypertension    • Obesity    • Obstructive sleep apnea    • Onychomycosis    • Pain in joint, multiple sites    • Sebaceous cyst    • Skin nodule     OF ARM, LEFT   • Snoring    • Vision changes      No past surgical history on file.      Current Outpatient Medications:   •  Cyanocobalamin (VITAMIN B-12 PO), Take  by mouth., Disp: , Rfl:   •  dicyclomine (BENTYL) 20 MG tablet, TAKE ONE TABLET BY MOUTH THREE TIMES A DAY AS NEEDED FOR BLOATING AND CRAMPING WITH A MEAL, Disp: 90 tablet, Rfl: 2  •  fenofibrate (TRICOR) 145 MG tablet, Take 1 tablet by mouth Daily., Disp: 90 tablet, Rfl: 1  •  lisinopril (PRINIVIL,ZESTRIL) 10 MG tablet, Take 1 tablet by mouth Daily., Disp: 90 tablet, Rfl: 1  •  omeprazole (priLOSEC) 40 MG capsule, Take 1 capsule by mouth Daily., Disp: 90 capsule, Rfl: 1  •  pramipexole (MIRAPEX) 0.125 MG tablet, Take 1 tablet by mouth Every Night. For restless legs, Disp: 90 tablet, Rfl: 1  •  rosuvastatin (CRESTOR) 20 MG tablet, Take 1 tablet by mouth Every Night., Disp: 90 tablet, Rfl: 1  •  ursodiol (ACTIGALL) 500 MG tablet, Take 1 tablet by mouth 2 (Two) Times a Day., Disp: 180 tablet, Rfl: 1  •  VITAMIN E PO, Take  by mouth., Disp: , Rfl:      Allergies   Allergen Reactions   • Sulfa Antibiotics Other (See Comments)     Rash, swelling, tingling, peeling.     Family History   Problem Relation Age of Onset   • Sleep apnea Father    • Hypertension Other    • Rheum arthritis Other      Cancer-related family history is not on file.    Social History     Tobacco Use   • Smoking status: Never Smoker   • Smokeless tobacco: Never Used   Substance Use Topics   • Alcohol use: No   • Drug use: No     Social History     Social History Narrative   • Not on file      ROS:     Review of Systems   Constitutional: Positive for fatigue. Negative for activity change, appetite change, chills, diaphoresis, fever and unexpected weight change.   HENT: Negative for congestion, dental problem, drooling, ear discharge, ear pain, facial swelling, hearing loss, mouth sores, nosebleeds, postnasal drip, rhinorrhea, sinus pressure, sinus pain, sneezing, sore throat, tinnitus, trouble swallowing and voice change.    Eyes: Negative for photophobia, pain, discharge, redness, itching and visual disturbance.   Respiratory: Negative for apnea, cough, choking, chest tightness, shortness of breath, wheezing and stridor.    Cardiovascular: Negative for chest pain, palpitations and leg swelling.   Gastrointestinal: Negative for abdominal distention, abdominal pain, anal bleeding, blood in stool, constipation, diarrhea, nausea, rectal pain and vomiting.   Endocrine: Negative for cold intolerance, heat intolerance, polydipsia and polyuria.   Genitourinary: Negative for decreased urine volume, difficulty urinating, dysuria, flank pain, frequency, genital sores, hematuria and urgency.   Musculoskeletal: Negative for arthralgias, back pain, gait problem, joint swelling, myalgias, neck pain and neck stiffness.   Skin: Negative for color change, pallor and rash.   Neurological: Negative for dizziness, tremors, seizures, syncope, facial asymmetry, speech difficulty, weakness, light-headedness,  "numbness and headaches.   Hematological: Negative for adenopathy. Does not bruise/bleed easily.   Psychiatric/Behavioral: Negative for agitation, behavioral problems, confusion, decreased concentration, hallucinations, self-injury, sleep disturbance and suicidal ideas. The patient is not nervous/anxious.      Objective:    Vitals:    09/15/22 0840   BP: 130/83   Pulse: 67   Temp: 96.9 °F (36.1 °C)   SpO2: 99%   Weight: 122 kg (270 lb)  Comment: verbal   Height: 162.6 cm (64\")   PainSc: 0-No pain     Body mass index is 46.35 kg/m².  ECOG  (0) Fully active, able to carry on all predisease performance without restriction    Physical Exam:     Physical Exam  Constitutional:       General: He is not in acute distress.     Appearance: He is obese. He is not ill-appearing, toxic-appearing or diaphoretic.      Comments: Well-built, well oriented and in no distress.  Seems older than the stated age and is morbidly obese.   HENT:      Head: Normocephalic and atraumatic.      Right Ear: External ear normal.      Left Ear: External ear normal.      Nose: Nose normal.      Mouth/Throat:      Mouth: Mucous membranes are moist.      Pharynx: Oropharynx is clear.   Eyes:      General: No scleral icterus.        Right eye: No discharge.         Left eye: No discharge.      Conjunctiva/sclera: Conjunctivae normal.      Pupils: Pupils are equal, round, and reactive to light.   Cardiovascular:      Rate and Rhythm: Normal rate and regular rhythm.      Pulses: Normal pulses.      Heart sounds: Normal heart sounds. No murmur heard.    No friction rub. No gallop.   Pulmonary:      Effort: No respiratory distress.      Breath sounds: No stridor. No wheezing, rhonchi or rales.   Chest:      Chest wall: No tenderness.   Abdominal:      General: Abdomen is flat. Bowel sounds are normal. There is no distension.      Palpations: Abdomen is soft. There is no mass.      Tenderness: There is no abdominal tenderness. There is no right CVA " tenderness, left CVA tenderness, guarding or rebound.   Musculoskeletal:         General: No swelling, tenderness, deformity or signs of injury.      Cervical back: No rigidity.      Right lower leg: No edema.      Left lower leg: No edema.   Lymphadenopathy:      Cervical: No cervical adenopathy.   Skin:     General: Skin is warm and dry.      Coloration: Skin is not jaundiced.      Findings: No bruising or rash.   Neurological:      General: No focal deficit present.      Mental Status: He is alert and oriented to person, place, and time.      Gait: Gait normal.   Psychiatric:         Mood and Affect: Mood normal.         Behavior: Behavior normal.         Thought Content: Thought content normal.         Judgment: Judgment normal.     DEMAR Greenwood MD performed the physical exam on 9/15/2022 as documented above    Lab Results - Last 18 Months   Lab Units 09/15/22  0833 08/23/22  0832 08/04/22  0852   WBC 10*3/mm3 2.52* 2.40* 1.94*   HEMOGLOBIN g/dL 14.6 13.7 13.7   HEMATOCRIT % 42.0 40.9 39.8   PLATELETS 10*3/mm3 99* 130* 140   MCV fL 85.4 86.7 84.9     Lab Results - Last 18 Months   Lab Units 08/04/22  0852 09/16/21  0858 06/23/21  1409   SODIUM mmol/L 137 136 136   POTASSIUM mmol/L 3.7 3.8 3.8   CHLORIDE mmol/L 102 101 100   CO2 mmol/L 26.0 24.6 23.7   BUN mg/dL 4* 5* 4*   CREATININE mg/dL 0.81 0.79 0.58*   CALCIUM mg/dL 8.7 9.0 8.2*   BILIRUBIN mg/dL 0.6 0.9 0.7   ALK PHOS U/L 73 70 131*   ALT (SGPT) U/L 19 17 19   AST (SGOT) U/L 24 33 42*   GLUCOSE mg/dL 119* 84 90     Lab Results   Component Value Date    GLUCOSE 119 (H) 08/04/2022    BUN 4 (L) 08/04/2022    CREATININE 0.81 08/04/2022    EGFRIFNONA 107 09/16/2021    BCR 4.9 (L) 08/04/2022    K 3.7 08/04/2022    CO2 26.0 08/04/2022    CALCIUM 8.7 08/04/2022    ALBUMIN 3.80 08/04/2022    LABIL2 1.2 11/16/2018    AST 24 08/04/2022    ALT 19 08/04/2022     Uric Acid   Date Value Ref Range Status   08/04/2022 4.1 3.4 - 7.0 mg/dL Final     Assessment & Plan      Assessment:  1. Leukopenia and neutropenia: I suspect this is a drug-induced phenomenon.  He takes several medications that are well known to result in this.  I have asked him to stop the dicyclomine, which is not particularly likely to result in neutropenia, because I wanted to simplify his regimen as best as possible.  I have also asked him to stop the omeprazole, which is well-known to result and hematologic changes, and at some point lisinopril and rosuvastatin which could also be contributing will need to be discontinued.  I will also investigate other potential causes of this.  2. Thrombocytopenia: At this point it appears that it has a downward trend.  I do not know how fast his platelet count is going to drop and I have asked him to have platelets checked twice next week and I will see him approximately a week from now.  I do suspect that that that this is also drug-induced but given that he gives a history of steatohepatitis splenomegaly could be participating as well.  Further investigation is necessary.  3. I have reviewed all the records including notes from the nurse practitioner as well as laboratory exams and imaging studies.  Discussed with him and with his spouse the results.  He  4. He is to see me again in approximately 2 weeks.    Plan:  As above    Sravan Greenwood MD on 9/15/2022 at 9:43 AM

## 2022-09-15 ENCOUNTER — PATIENT ROUNDING (BHMG ONLY) (OUTPATIENT)
Dept: ONCOLOGY | Facility: CLINIC | Age: 42
End: 2022-09-15

## 2022-09-15 ENCOUNTER — CONSULT (OUTPATIENT)
Dept: ONCOLOGY | Facility: CLINIC | Age: 42
End: 2022-09-15

## 2022-09-15 ENCOUNTER — LAB (OUTPATIENT)
Dept: LAB | Facility: HOSPITAL | Age: 42
End: 2022-09-15

## 2022-09-15 VITALS
DIASTOLIC BLOOD PRESSURE: 83 MMHG | BODY MASS INDEX: 46.1 KG/M2 | HEART RATE: 67 BPM | OXYGEN SATURATION: 99 % | SYSTOLIC BLOOD PRESSURE: 130 MMHG | TEMPERATURE: 96.9 F | WEIGHT: 270 LBS | HEIGHT: 64 IN

## 2022-09-15 DIAGNOSIS — D70.9 NEUTROPENIA, UNSPECIFIED TYPE: ICD-10-CM

## 2022-09-15 DIAGNOSIS — D69.6 THROMBOCYTOPENIA: ICD-10-CM

## 2022-09-15 DIAGNOSIS — D69.6 THROMBOCYTOPENIA: Primary | ICD-10-CM

## 2022-09-15 LAB
ALBUMIN SERPL-MCNC: 4 G/DL (ref 3.5–5.2)
ALBUMIN/GLOB SERPL: 1.2 G/DL
ALP SERPL-CCNC: 72 U/L (ref 39–117)
ALT SERPL W P-5'-P-CCNC: 11 U/L (ref 1–41)
ANION GAP SERPL CALCULATED.3IONS-SCNC: 8 MMOL/L (ref 5–15)
AST SERPL-CCNC: 23 U/L (ref 1–40)
BASOPHILS # BLD AUTO: 0 10*3/MM3 (ref 0–0.2)
BASOPHILS NFR BLD AUTO: 0 % (ref 0–1.5)
BILIRUB SERPL-MCNC: 0.7 MG/DL (ref 0–1.2)
BUN SERPL-MCNC: 5 MG/DL (ref 6–20)
BUN/CREAT SERPL: 5.9 (ref 7–25)
CALCIUM SPEC-SCNC: 8.9 MG/DL (ref 8.6–10.5)
CHLORIDE SERPL-SCNC: 102 MMOL/L (ref 98–107)
CO2 SERPL-SCNC: 28 MMOL/L (ref 22–29)
CREAT SERPL-MCNC: 0.85 MG/DL (ref 0.76–1.27)
DEPRECATED RDW RBC AUTO: 43.6 FL (ref 37–54)
EGFRCR SERPLBLD CKD-EPI 2021: 109.9 ML/MIN/1.73
EOSINOPHIL # BLD AUTO: 0 10*3/MM3 (ref 0–0.4)
EOSINOPHIL NFR BLD AUTO: 0 % (ref 0.3–6.2)
ERYTHROCYTE [DISTWIDTH] IN BLOOD BY AUTOMATED COUNT: 14.3 % (ref 12.3–15.4)
FERRITIN SERPL-MCNC: 83.39 NG/ML (ref 30–400)
FOLATE SERPL-MCNC: 5.83 NG/ML (ref 4.78–24.2)
GLOBULIN UR ELPH-MCNC: 3.3 GM/DL
GLUCOSE SERPL-MCNC: 100 MG/DL (ref 65–99)
HCT VFR BLD AUTO: 42 % (ref 37.5–51)
HCV AB SER DONR QL: NORMAL
HGB BLD-MCNC: 14.6 G/DL (ref 13–17.7)
HIV1+2 AB SER QL: NORMAL
IRON 24H UR-MRATE: 120 MCG/DL (ref 59–158)
IRON SATN MFR SERPL: 29 % (ref 20–50)
LYMPHOCYTES # BLD AUTO: 1.01 10*3/MM3 (ref 0.7–3.1)
LYMPHOCYTES NFR BLD AUTO: 40.1 % (ref 19.6–45.3)
MCH RBC QN AUTO: 29.7 PG (ref 26.6–33)
MCHC RBC AUTO-ENTMCNC: 34.8 G/DL (ref 31.5–35.7)
MCV RBC AUTO: 85.4 FL (ref 79–97)
MONOCYTES # BLD AUTO: 0.36 10*3/MM3 (ref 0.1–0.9)
MONOCYTES NFR BLD AUTO: 14.3 % (ref 5–12)
NEUTROPHILS NFR BLD AUTO: 1.15 10*3/MM3 (ref 1.7–7)
NEUTROPHILS NFR BLD AUTO: 45.6 % (ref 42.7–76)
PLATELET # BLD AUTO: 99 10*3/MM3 (ref 140–450)
PMV BLD AUTO: 12.4 FL (ref 6–12)
POTASSIUM SERPL-SCNC: 3.9 MMOL/L (ref 3.5–5.2)
PROT SERPL-MCNC: 7.3 G/DL (ref 6–8.5)
RBC # BLD AUTO: 4.92 10*6/MM3 (ref 4.14–5.8)
SODIUM SERPL-SCNC: 138 MMOL/L (ref 136–145)
TIBC SERPL-MCNC: 414 MCG/DL (ref 298–536)
TRANSFERRIN SERPL-MCNC: 278 MG/DL (ref 200–360)
TSH SERPL DL<=0.05 MIU/L-ACNC: 1.85 UIU/ML (ref 0.27–4.2)
VIT B12 BLD-MCNC: 1681 PG/ML (ref 211–946)
WBC NRBC COR # BLD: 2.52 10*3/MM3 (ref 3.4–10.8)

## 2022-09-15 PROCEDURE — 99204 OFFICE O/P NEW MOD 45 MIN: CPT | Performed by: INTERNAL MEDICINE

## 2022-09-15 PROCEDURE — G0432 EIA HIV-1/HIV-2 SCREEN: HCPCS | Performed by: INTERNAL MEDICINE

## 2022-09-15 PROCEDURE — 36415 COLL VENOUS BLD VENIPUNCTURE: CPT | Performed by: INTERNAL MEDICINE

## 2022-09-15 PROCEDURE — 84466 ASSAY OF TRANSFERRIN: CPT | Performed by: INTERNAL MEDICINE

## 2022-09-15 PROCEDURE — 85025 COMPLETE CBC W/AUTO DIFF WBC: CPT

## 2022-09-15 PROCEDURE — 82746 ASSAY OF FOLIC ACID SERUM: CPT | Performed by: INTERNAL MEDICINE

## 2022-09-15 PROCEDURE — 83540 ASSAY OF IRON: CPT | Performed by: INTERNAL MEDICINE

## 2022-09-15 PROCEDURE — 82607 VITAMIN B-12: CPT | Performed by: INTERNAL MEDICINE

## 2022-09-15 PROCEDURE — 82728 ASSAY OF FERRITIN: CPT | Performed by: INTERNAL MEDICINE

## 2022-09-15 PROCEDURE — 80053 COMPREHEN METABOLIC PANEL: CPT | Performed by: INTERNAL MEDICINE

## 2022-09-15 PROCEDURE — 84443 ASSAY THYROID STIM HORMONE: CPT | Performed by: INTERNAL MEDICINE

## 2022-09-15 PROCEDURE — 86803 HEPATITIS C AB TEST: CPT | Performed by: INTERNAL MEDICINE

## 2022-09-15 NOTE — PROGRESS NOTES
September 15, 2022    Hello, may I speak with Farhad Khan?    My name is Dimple Cavazos      I am  with [facility] [address] [place] [address] [phone].    Before we get started may I verify your date of birth? 1980    I am calling to officially welcome you to our practice and ask about your recent visit. Is this a good time to talk? no    Tell me about your visit with us. What things went well?  A My Chart message was sent to the patient.       We're always looking for ways to make our patients' experiences even better. Do you have recommendations on ways we may improve?  no    Overall were you satisfied with your first visit to our practice? yes       I appreciate you taking the time to speak with me today. Is there anything else I can do for you? no      Thank you, and have a great day.

## 2022-09-19 ENCOUNTER — LAB (OUTPATIENT)
Dept: LAB | Facility: HOSPITAL | Age: 42
End: 2022-09-19

## 2022-09-19 DIAGNOSIS — D70.9 NEUTROPENIA, UNSPECIFIED TYPE: ICD-10-CM

## 2022-09-19 DIAGNOSIS — D69.6 THROMBOCYTOPENIA: ICD-10-CM

## 2022-09-19 LAB
BASOPHILS # BLD AUTO: 0 10*3/MM3 (ref 0–0.2)
BASOPHILS NFR BLD AUTO: 0 % (ref 0–1.5)
DEPRECATED RDW RBC AUTO: 45.9 FL (ref 37–54)
EOSINOPHIL # BLD AUTO: 0 10*3/MM3 (ref 0–0.4)
EOSINOPHIL NFR BLD AUTO: 0 % (ref 0.3–6.2)
ERYTHROCYTE [DISTWIDTH] IN BLOOD BY AUTOMATED COUNT: 14.5 % (ref 12.3–15.4)
HCT VFR BLD AUTO: 41.8 % (ref 37.5–51)
HGB BLD-MCNC: 14.2 G/DL (ref 13–17.7)
LYMPHOCYTES # BLD AUTO: 0.79 10*3/MM3 (ref 0.7–3.1)
LYMPHOCYTES NFR BLD AUTO: 32.2 % (ref 19.6–45.3)
MCH RBC QN AUTO: 29.6 PG (ref 26.6–33)
MCHC RBC AUTO-ENTMCNC: 34 G/DL (ref 31.5–35.7)
MCV RBC AUTO: 87.3 FL (ref 79–97)
MONOCYTES # BLD AUTO: 0.27 10*3/MM3 (ref 0.1–0.9)
MONOCYTES NFR BLD AUTO: 11 % (ref 5–12)
NEUTROPHILS NFR BLD AUTO: 1.39 10*3/MM3 (ref 1.7–7)
NEUTROPHILS NFR BLD AUTO: 56.8 % (ref 42.7–76)
PLATELET # BLD AUTO: 129 10*3/MM3 (ref 140–450)
PMV BLD AUTO: 10.2 FL (ref 6–12)
RBC # BLD AUTO: 4.79 10*6/MM3 (ref 4.14–5.8)
WBC NRBC COR # BLD: 2.45 10*3/MM3 (ref 3.4–10.8)

## 2022-09-19 PROCEDURE — 36415 COLL VENOUS BLD VENIPUNCTURE: CPT

## 2022-09-19 PROCEDURE — 85025 COMPLETE CBC W/AUTO DIFF WBC: CPT

## 2022-09-22 ENCOUNTER — LAB (OUTPATIENT)
Dept: LAB | Facility: HOSPITAL | Age: 42
End: 2022-09-22

## 2022-09-22 DIAGNOSIS — D70.9 NEUTROPENIA, UNSPECIFIED TYPE: ICD-10-CM

## 2022-09-22 DIAGNOSIS — D69.6 THROMBOCYTOPENIA: ICD-10-CM

## 2022-09-22 LAB
BASOPHILS # BLD AUTO: 0.01 10*3/MM3 (ref 0–0.2)
BASOPHILS NFR BLD AUTO: 0.4 % (ref 0–1.5)
DEPRECATED RDW RBC AUTO: 45.5 FL (ref 37–54)
EOSINOPHIL # BLD AUTO: 0 10*3/MM3 (ref 0–0.4)
EOSINOPHIL NFR BLD AUTO: 0 % (ref 0.3–6.2)
ERYTHROCYTE [DISTWIDTH] IN BLOOD BY AUTOMATED COUNT: 14.6 % (ref 12.3–15.4)
HCT VFR BLD AUTO: 42.3 % (ref 37.5–51)
HGB BLD-MCNC: 14.5 G/DL (ref 13–17.7)
LYMPHOCYTES # BLD AUTO: 0.95 10*3/MM3 (ref 0.7–3.1)
LYMPHOCYTES NFR BLD AUTO: 37.7 % (ref 19.6–45.3)
MCH RBC QN AUTO: 29.7 PG (ref 26.6–33)
MCHC RBC AUTO-ENTMCNC: 34.3 G/DL (ref 31.5–35.7)
MCV RBC AUTO: 86.5 FL (ref 79–97)
MONOCYTES # BLD AUTO: 0.28 10*3/MM3 (ref 0.1–0.9)
MONOCYTES NFR BLD AUTO: 11.1 % (ref 5–12)
NEUTROPHILS NFR BLD AUTO: 1.28 10*3/MM3 (ref 1.7–7)
NEUTROPHILS NFR BLD AUTO: 50.8 % (ref 42.7–76)
PLATELET # BLD AUTO: 127 10*3/MM3 (ref 140–450)
PMV BLD AUTO: 10.1 FL (ref 6–12)
RBC # BLD AUTO: 4.89 10*6/MM3 (ref 4.14–5.8)
WBC NRBC COR # BLD: 2.52 10*3/MM3 (ref 3.4–10.8)

## 2022-09-22 PROCEDURE — 85025 COMPLETE CBC W/AUTO DIFF WBC: CPT

## 2022-09-22 PROCEDURE — 36415 COLL VENOUS BLD VENIPUNCTURE: CPT

## 2022-09-23 NOTE — PROGRESS NOTES
HEMATOLOGY ONCOLOGY OUTPATIENT CONSULTATION       Patient name: Farhad Khan  : 1980  MRN: [id number]  Primary Care Physician: Jacqueline Dwyer APRN  Referring Physician: Jacqueline Dwyer APRN  Reason For Consult:     No chief complaint on file.    HPI:   History of Present Illness:  Farhad Khan is 42 y.o. male who presented to the office on 09/15/22 for consultation regarding    9/15/2022: Mr. Khan was referred for the investigation of leukopenia, neutropenia and thrombocytopenia.  He was first noted to have moderate leukopenia approximately 1 year before this visit.  Over the course of several months the leukopenia became somewhat worse and at some point his total white cells were less than 2.0.  Approximately 3 weeks before this visit this had improved and the total white cell count was up to 2.4 and this was associated to moderate neutropenia.  On this basis he was referred.  He had no new symptoms.  For some time, perhaps as many as 3 years, he had been experiencing fatigue and general malaise.  He had not had any changes in his work and he was able to fulfill all his duties.  He had been afebrile and without weight loss.  For at least 2 or 3 years he had maintained a similar weight.  He had been without chest pains or cough.  And denied expectoration or hemoptysis.  No dysphagia.  He had had no abdominal pain and denied diarrhea or dysuria.  He had not experienced any peripheral edema.  No skin rash.  For approximately 2 years he has been taking a combination of medications that included lisinopril, omeprazole and rosuvastatin.    2022: In the office to review the results of his tests.  Felt reasonably well and perhaps better than the previous week he had been much more fatigued at that time.  Eating well.  Reported frequent diarrhea but only intermittently.  Denied fevers.  Had had no chest pains and no increasing dyspnea.  No abdominal pain at the time but  did admit to intermittent right upper quadrant pain.  No edema.  The met laboratory exams were essentially unremarkable though they did confirm leukopenia, neutropenia and thrombocytopenia.  No suggestion of a bone marrow disorder, howeve.  The ultrasound of the abdomen confirmed the presence of a liver with cirrhotic morphology and splenomegaly.  It was felt that the explanation for his cytopenias was this splenomegaly.    Subjective:  • 9/28/2022: In the office for follow-up.  Reported no new symptoms.  His fatigue had improved for unclear reasons.  He was eating well.  He had had no unintended weight loss and denied fevers.  No chest pains.  No cough.  Persisted with some dyspnea with exertion.  He did report that he had had intermittent right upper quadrant pain and that this frequently was associated with diarrhea.  At the time of the visit he was pain-free and had not had any diarrhea.  No dysuria.  No edema.    The following portions of the patient's history were reviewed and updated as appropriate: allergies, current medications, past family history, past medical history, past social history, past surgical history and problem list.    Past Medical History:   Diagnosis Date   • B12 deficiency    • Chronic fatigue    • Dizziness    • Elevated LFTs    • Fatty liver     NON ALCOHOLIC   • GERD (gastroesophageal reflux disease)    • Gout    • Headache    • HSV infection    • Hyperglycemia    • Hyperlipidemia    • Hypertension    • Obesity    • Obstructive sleep apnea    • Onychomycosis    • Pain in joint, multiple sites    • Sebaceous cyst    • Skin nodule     OF ARM, LEFT   • Snoring    • Vision changes      No past surgical history on file.      Current Outpatient Medications:   •  Cyanocobalamin (VITAMIN B-12 PO), Take  by mouth., Disp: , Rfl:   •  dicyclomine (BENTYL) 20 MG tablet, TAKE ONE TABLET BY MOUTH THREE TIMES A DAY AS NEEDED FOR BLOATING AND CRAMPING WITH A MEAL, Disp: 90 tablet, Rfl: 2  •   fenofibrate (TRICOR) 145 MG tablet, Take 1 tablet by mouth Daily., Disp: 90 tablet, Rfl: 1  •  lisinopril (PRINIVIL,ZESTRIL) 10 MG tablet, Take 1 tablet by mouth Daily., Disp: 90 tablet, Rfl: 1  •  omeprazole (priLOSEC) 40 MG capsule, Take 1 capsule by mouth Daily., Disp: 90 capsule, Rfl: 1  •  pramipexole (MIRAPEX) 0.125 MG tablet, Take 1 tablet by mouth Every Night. For restless legs, Disp: 90 tablet, Rfl: 1  •  rosuvastatin (CRESTOR) 20 MG tablet, Take 1 tablet by mouth Every Night., Disp: 90 tablet, Rfl: 1  •  ursodiol (ACTIGALL) 500 MG tablet, Take 1 tablet by mouth 2 (Two) Times a Day., Disp: 180 tablet, Rfl: 1  •  VITAMIN E PO, Take  by mouth., Disp: , Rfl:     Allergies   Allergen Reactions   • Sulfa Antibiotics Other (See Comments)     Rash, swelling, tingling, peeling.     Family History   Problem Relation Age of Onset   • Sleep apnea Father    • COPD Father    • Other Brother         Desmoid tumor of the abdomen   • Hypertension Other    • Rheum arthritis Other      Cancer-related family history is not on file.    Social History     Tobacco Use   • Smoking status: Never Smoker   • Smokeless tobacco: Never Used   Vaping Use   • Vaping Use: Never used   Substance Use Topics   • Alcohol use: No   • Drug use: No     Social History     Social History Narrative   • Not on file      ROS:     Review of Systems   Constitutional: Positive for fatigue. Negative for activity change, appetite change, chills, diaphoresis, fever and unexpected weight change.   HENT: Negative for congestion, dental problem, drooling, ear discharge, ear pain, facial swelling, hearing loss, mouth sores, nosebleeds, postnasal drip, rhinorrhea, sinus pressure, sinus pain, sneezing, sore throat, tinnitus, trouble swallowing and voice change.    Eyes: Negative for photophobia, pain, discharge, redness, itching and visual disturbance.   Respiratory: Negative for apnea, cough, choking, chest tightness, shortness of breath, wheezing and stridor.     Cardiovascular: Negative for chest pain, palpitations and leg swelling.   Gastrointestinal: Negative for abdominal distention, abdominal pain, anal bleeding, blood in stool, constipation, diarrhea, nausea, rectal pain and vomiting.   Endocrine: Negative for cold intolerance, heat intolerance, polydipsia and polyuria.   Genitourinary: Negative for decreased urine volume, difficulty urinating, dysuria, flank pain, frequency, genital sores, hematuria and urgency.   Musculoskeletal: Negative for arthralgias, back pain, gait problem, joint swelling, myalgias, neck pain and neck stiffness.   Skin: Negative for color change, pallor and rash.   Neurological: Negative for dizziness, tremors, seizures, syncope, facial asymmetry, speech difficulty, weakness, light-headedness, numbness and headaches.   Hematological: Negative for adenopathy. Does not bruise/bleed easily.   Psychiatric/Behavioral: Negative for agitation, behavioral problems, confusion, decreased concentration, hallucinations, self-injury, sleep disturbance and suicidal ideas. The patient is not nervous/anxious.      Objective:    There were no vitals filed for this visit.  There is no height or weight on file to calculate BMI.  ECOG  (0) Fully active, able to carry on all predisease performance without restriction    Physical Exam:     Physical Exam  Constitutional:       General: He is not in acute distress.     Appearance: He is obese. He is not ill-appearing, toxic-appearing or diaphoretic.      Comments: Conversant, oriented and in no distress.  Morbidly obese.   HENT:      Head: Normocephalic and atraumatic.      Right Ear: External ear normal.      Left Ear: External ear normal.      Nose: Nose normal.      Mouth/Throat:      Mouth: Mucous membranes are moist.      Pharynx: Oropharynx is clear.   Eyes:      General: No scleral icterus.        Right eye: No discharge.         Left eye: No discharge.      Conjunctiva/sclera: Conjunctivae normal.       Pupils: Pupils are equal, round, and reactive to light.   Cardiovascular:      Rate and Rhythm: Normal rate and regular rhythm.      Pulses: Normal pulses.      Heart sounds: Normal heart sounds. No murmur heard.    No friction rub. No gallop.   Pulmonary:      Effort: No respiratory distress.      Breath sounds: No stridor. No wheezing, rhonchi or rales.   Chest:      Chest wall: No tenderness.   Abdominal:      General: Abdomen is flat. Bowel sounds are normal. There is no distension.      Palpations: Abdomen is soft. There is no mass.      Tenderness: There is no abdominal tenderness. There is no right CVA tenderness, left CVA tenderness, guarding or rebound.   Musculoskeletal:         General: No swelling, tenderness, deformity or signs of injury.      Cervical back: No rigidity.      Right lower leg: No edema.      Left lower leg: No edema.   Lymphadenopathy:      Cervical: No cervical adenopathy.   Skin:     General: Skin is warm and dry.      Coloration: Skin is not jaundiced.      Findings: No bruising or rash.   Neurological:      General: No focal deficit present.      Mental Status: He is alert and oriented to person, place, and time.      Gait: Gait normal.   Psychiatric:         Mood and Affect: Mood normal.         Behavior: Behavior normal.         Thought Content: Thought content normal.         Judgment: Judgment normal.     DEMAR Greenwood MD performed the physical exam on 9/28/2022 as documented above    Lab Results - Last 18 Months   Lab Units 09/22/22  0818 09/19/22  0805 09/15/22  0833   WBC 10*3/mm3 2.52* 2.45* 2.52*   HEMOGLOBIN g/dL 14.5 14.2 14.6   HEMATOCRIT % 42.3 41.8 42.0   PLATELETS 10*3/mm3 127* 129* 99*   MCV fL 86.5 87.3 85.4     Lab Results - Last 18 Months   Lab Units 09/15/22  0954 08/04/22  0852 09/16/21  0858   SODIUM mmol/L 138 137 136   POTASSIUM mmol/L 3.9 3.7 3.8   CHLORIDE mmol/L 102 102 101   CO2 mmol/L 28.0 26.0 24.6   BUN mg/dL 5* 4* 5*   CREATININE mg/dL 0.85 0.81  0.79   CALCIUM mg/dL 8.9 8.7 9.0   BILIRUBIN mg/dL 0.7 0.6 0.9   ALK PHOS U/L 72 73 70   ALT (SGPT) U/L 11 19 17   AST (SGOT) U/L 23 24 33   GLUCOSE mg/dL 100* 119* 84     Lab Results   Component Value Date    GLUCOSE 100 (H) 09/15/2022    BUN 5 (L) 09/15/2022    CREATININE 0.85 09/15/2022    EGFRIFNONA 107 09/16/2021    BCR 5.9 (L) 09/15/2022    K 3.9 09/15/2022    CO2 28.0 09/15/2022    CALCIUM 8.9 09/15/2022    ALBUMIN 4.00 09/15/2022    LABIL2 1.2 11/16/2018    AST 23 09/15/2022    ALT 11 09/15/2022     Uric Acid   Date Value Ref Range Status   08/04/2022 4.1 3.4 - 7.0 mg/dL Final     Assessment & Plan     Assessment:  1. Leukopenia and neutropenia: My impression at that time of the last visit was that this corresponded to a drug induced phenomenon.  He had been taking some drugs that have been associated with this.  However, finding splenomegaly and evidence of cirrhosis, my impression has changed and now I believe that this is the cause of the leukopenia and neutropenia.  Discussed with him.  2. Thrombocytopenia: The ultrasound of the abdomen has confirmed cirrhotic morphology of his liver and there is significant splenomegaly.  On this basis hypersplenism is likely the cause of both his leukopenia and neutropenia as well as the thrombocytopenia.  3. Reviewed all the laboratory exams and discussed at length with him and with his spouse who had several questions.  Explained the findings and their significance as well as the plans.  I have asked him to return to see Dr. Raines, his gastroenterologist.  4. He is to see me in approximately 2 months.    Plan:  As above    Sravan Greenwood MD on 9/28/2022 at 9:20 AM

## 2022-09-24 ENCOUNTER — HOSPITAL ENCOUNTER (OUTPATIENT)
Dept: ULTRASOUND IMAGING | Facility: HOSPITAL | Age: 42
Discharge: HOME | End: 2022-09-24
Admitting: INTERNAL MEDICINE

## 2022-09-24 DIAGNOSIS — D69.6 THROMBOCYTOPENIA: ICD-10-CM

## 2022-09-24 DIAGNOSIS — D70.9 NEUTROPENIA, UNSPECIFIED TYPE: ICD-10-CM

## 2022-09-24 PROCEDURE — 76705 ECHO EXAM OF ABDOMEN: CPT

## 2022-09-28 ENCOUNTER — LAB (OUTPATIENT)
Dept: LAB | Facility: HOSPITAL | Age: 42
End: 2022-09-28

## 2022-09-28 ENCOUNTER — OFFICE VISIT (OUTPATIENT)
Dept: ONCOLOGY | Facility: CLINIC | Age: 42
End: 2022-09-28

## 2022-09-28 VITALS
HEART RATE: 74 BPM | TEMPERATURE: 96.8 F | OXYGEN SATURATION: 99 % | WEIGHT: 270 LBS | BODY MASS INDEX: 46.1 KG/M2 | SYSTOLIC BLOOD PRESSURE: 116 MMHG | HEIGHT: 64 IN | DIASTOLIC BLOOD PRESSURE: 75 MMHG

## 2022-09-28 DIAGNOSIS — D69.6 THROMBOCYTOPENIA: Primary | ICD-10-CM

## 2022-09-28 LAB
BASOPHILS # BLD AUTO: 0.01 10*3/MM3 (ref 0–0.2)
BASOPHILS NFR BLD AUTO: 0.3 % (ref 0–1.5)
DEPRECATED RDW RBC AUTO: 46.1 FL (ref 37–54)
EOSINOPHIL # BLD AUTO: 0 10*3/MM3 (ref 0–0.4)
EOSINOPHIL NFR BLD AUTO: 0 % (ref 0.3–6.2)
ERYTHROCYTE [DISTWIDTH] IN BLOOD BY AUTOMATED COUNT: 14.7 % (ref 12.3–15.4)
HCT VFR BLD AUTO: 40.2 % (ref 37.5–51)
HGB BLD-MCNC: 13.6 G/DL (ref 13–17.7)
HOLD SPECIMEN: NORMAL
HOLD SPECIMEN: NORMAL
LYMPHOCYTES # BLD AUTO: 1.03 10*3/MM3 (ref 0.7–3.1)
LYMPHOCYTES NFR BLD AUTO: 35.8 % (ref 19.6–45.3)
MCH RBC QN AUTO: 29.5 PG (ref 26.6–33)
MCHC RBC AUTO-ENTMCNC: 33.8 G/DL (ref 31.5–35.7)
MCV RBC AUTO: 87.2 FL (ref 79–97)
MONOCYTES # BLD AUTO: 0.3 10*3/MM3 (ref 0.1–0.9)
MONOCYTES NFR BLD AUTO: 10.4 % (ref 5–12)
NEUTROPHILS NFR BLD AUTO: 1.54 10*3/MM3 (ref 1.7–7)
NEUTROPHILS NFR BLD AUTO: 53.5 % (ref 42.7–76)
PLATELET # BLD AUTO: 134 10*3/MM3 (ref 140–450)
PMV BLD AUTO: 9.7 FL (ref 6–12)
RBC # BLD AUTO: 4.61 10*6/MM3 (ref 4.14–5.8)
WBC NRBC COR # BLD: 2.88 10*3/MM3 (ref 3.4–10.8)

## 2022-09-28 PROCEDURE — 85025 COMPLETE CBC W/AUTO DIFF WBC: CPT

## 2022-09-28 PROCEDURE — 99214 OFFICE O/P EST MOD 30 MIN: CPT | Performed by: INTERNAL MEDICINE

## 2022-09-28 PROCEDURE — 36415 COLL VENOUS BLD VENIPUNCTURE: CPT

## 2022-09-30 ENCOUNTER — OFFICE (AMBULATORY)
Dept: URBAN - METROPOLITAN AREA CLINIC 64 | Facility: CLINIC | Age: 42
End: 2022-09-30

## 2022-09-30 VITALS
HEIGHT: 64 IN | HEART RATE: 80 BPM | SYSTOLIC BLOOD PRESSURE: 125 MMHG | WEIGHT: 271 LBS | DIASTOLIC BLOOD PRESSURE: 79 MMHG

## 2022-09-30 DIAGNOSIS — Z86.010 PERSONAL HISTORY OF COLONIC POLYPS: ICD-10-CM

## 2022-09-30 DIAGNOSIS — K74.69 OTHER CIRRHOSIS OF LIVER: ICD-10-CM

## 2022-09-30 DIAGNOSIS — D69.6 THROMBOCYTOPENIA, UNSPECIFIED: ICD-10-CM

## 2022-09-30 DIAGNOSIS — R10.11 RIGHT UPPER QUADRANT PAIN: ICD-10-CM

## 2022-09-30 DIAGNOSIS — D72.819 DECREASED WHITE BLOOD CELL COUNT, UNSPECIFIED: ICD-10-CM

## 2022-09-30 DIAGNOSIS — K52.9 NONINFECTIVE GASTROENTERITIS AND COLITIS, UNSPECIFIED: ICD-10-CM

## 2022-09-30 DIAGNOSIS — K80.20 CALCULUS OF GALLBLADDER WITHOUT CHOLECYSTITIS WITHOUT OBSTRU: ICD-10-CM

## 2022-09-30 DIAGNOSIS — K21.9 GASTRO-ESOPHAGEAL REFLUX DISEASE WITHOUT ESOPHAGITIS: ICD-10-CM

## 2022-09-30 PROCEDURE — 99214 OFFICE O/P EST MOD 30 MIN: CPT | Performed by: NURSE PRACTITIONER

## 2022-11-16 NOTE — PROGRESS NOTES
HEMATOLOGY ONCOLOGY OUTPATIENT CONSULTATION       Patient name: Farhad Khan  : 1980  MRN: [id number]  Primary Care Physician: Jacqueline Dwyer APRN  Referring Physician: Jacqueline Dwyer APRN  Reason For Consult:     No chief complaint on file.    HPI:   History of Present Illness:  Farhad Khan is 42 y.o. male who presented to the office on 09/15/22 for consultation regarding    9/15/2022: Mr. Khan was referred for the investigation of leukopenia, neutropenia and thrombocytopenia.  He was first noted to have moderate leukopenia approximately 1 year before this visit.  Over the course of several months the leukopenia became somewhat worse and at some point his total white cells were less than 2.0.  Approximately 3 weeks before this visit this had improved and the total white cell count was up to 2.4 and this was associated to moderate neutropenia.  On this basis he was referred.  He had no new symptoms.  For some time, perhaps as many as 3 years, he had been experiencing fatigue and general malaise.  He had not had any changes in his work and he was able to fulfill all his duties.  He had been afebrile and without weight loss.  For at least 2 or 3 years he had maintained a similar weight.  He had been without chest pains or cough.  And denied expectoration or hemoptysis.  No dysphagia.  He had had no abdominal pain and denied diarrhea or dysuria.  He had not experienced any peripheral edema.  No skin rash.  For approximately 2 years he has been taking a combination of medications that included lisinopril, omeprazole and rosuvastatin.    2022: In the office to review the results of his tests.  Felt reasonably well and perhaps better than the previous week he had been much more fatigued at that time.  Eating well.  Reported frequent diarrhea but only intermittently.  Denied fevers.  Had had no chest pains and no increasing dyspnea.  No abdominal pain at the time but  did admit to intermittent right upper quadrant pain.  No edema.  The met laboratory exams were essentially unremarkable though they did confirm leukopenia, neutropenia and thrombocytopenia.  No suggestion of a bone marrow disorder, howeve.  The ultrasound of the abdomen confirmed the presence of a liver with cirrhotic morphology and splenomegaly.  It was felt that the explanation for his cytopenias was this splenomegaly.    11/18/2022: Feeling reasonably well. Continued to work and described being more active than before. Eating well and stable weight, though his wife described that he did not eat enough to justify his weight. Seen by the nurse practitioner at Dr. Knight's office. He was offered a clinical trial but did not offer anything else. The physical exam was unchanged. He persisted with moderate leukopenia and neutropenia, as well as thrombocytopenia. They had not changed and were not associated to any other problems. A decision was made to continue to follow.     Subjective:  • 11/18/2022: Back for follow up without new symptoms. No bleeding. No fevers or infections. Remains active and is trying to increase his activity. Has been eating well. No nausea. No chest pain or cough and no abdominal pain. No diarrhea or dysuria. No edema. No skin rash.     The following portions of the patient's history were reviewed and updated as appropriate: allergies, current medications, past family history, past medical history, past social history, past surgical history and problem list.    Past Medical History:   Diagnosis Date   • B12 deficiency    • Chronic fatigue    • Dizziness    • Elevated LFTs    • Fatty liver     NON ALCOHOLIC   • GERD (gastroesophageal reflux disease)    • Gout    • Headache    • HSV infection    • Hyperglycemia    • Hyperlipidemia    • Hypertension    • Obesity    • Obstructive sleep apnea    • Onychomycosis    • Pain in joint, multiple sites    • Sebaceous cyst    • Skin nodule     OF ARM, LEFT    • Snoring    • Vision changes      No past surgical history on file.      Current Outpatient Medications:   •  Cyanocobalamin (VITAMIN B-12 PO), Take  by mouth., Disp: , Rfl:   •  dicyclomine (BENTYL) 20 MG tablet, TAKE ONE TABLET BY MOUTH THREE TIMES A DAY AS NEEDED FOR BLOATING AND CRAMPING WITH A MEAL, Disp: 90 tablet, Rfl: 2  •  fenofibrate (TRICOR) 145 MG tablet, Take 1 tablet by mouth Daily., Disp: 90 tablet, Rfl: 1  •  lisinopril (PRINIVIL,ZESTRIL) 10 MG tablet, Take 1 tablet by mouth Daily., Disp: 90 tablet, Rfl: 1  •  omeprazole (priLOSEC) 40 MG capsule, Take 1 capsule by mouth Daily., Disp: 90 capsule, Rfl: 1  •  pramipexole (MIRAPEX) 0.125 MG tablet, Take 1 tablet by mouth Every Night. For restless legs, Disp: 90 tablet, Rfl: 1  •  rosuvastatin (CRESTOR) 20 MG tablet, Take 1 tablet by mouth Every Night., Disp: 90 tablet, Rfl: 1  •  ursodiol (ACTIGALL) 500 MG tablet, Take 1 tablet by mouth 2 (Two) Times a Day., Disp: 180 tablet, Rfl: 1  •  VITAMIN E PO, Take  by mouth., Disp: , Rfl:     Allergies   Allergen Reactions   • Sulfa Antibiotics Other (See Comments)     Rash, swelling, tingling, peeling.     Family History   Problem Relation Age of Onset   • Sleep apnea Father    • COPD Father    • Other Brother         Desmoid tumor of the abdomen   • Hypertension Other    • Rheum arthritis Other      Cancer-related family history is not on file.    Social History     Tobacco Use   • Smoking status: Never   • Smokeless tobacco: Never   Vaping Use   • Vaping Use: Never used   Substance Use Topics   • Alcohol use: No   • Drug use: No     Social History     Social History Narrative   • Not on file      ROS:     Review of Systems   Constitutional: Positive for fatigue. Negative for activity change, appetite change, chills, diaphoresis, fever and unexpected weight change.   HENT: Negative for congestion, dental problem, drooling, ear discharge, ear pain, facial swelling, hearing loss, mouth sores, nosebleeds,  postnasal drip, rhinorrhea, sinus pressure, sinus pain, sneezing, sore throat, tinnitus, trouble swallowing and voice change.    Eyes: Negative for photophobia, pain, discharge, redness, itching and visual disturbance.   Respiratory: Negative for apnea, cough, choking, chest tightness, shortness of breath, wheezing and stridor.    Cardiovascular: Negative for chest pain, palpitations and leg swelling.   Gastrointestinal: Negative for abdominal distention, abdominal pain, anal bleeding, blood in stool, constipation, diarrhea, nausea, rectal pain and vomiting.   Endocrine: Negative for cold intolerance, heat intolerance, polydipsia and polyuria.   Genitourinary: Negative for decreased urine volume, difficulty urinating, dysuria, flank pain, frequency, genital sores, hematuria and urgency.   Musculoskeletal: Negative for arthralgias, back pain, gait problem, joint swelling, myalgias, neck pain and neck stiffness.   Skin: Negative for color change, pallor and rash.   Neurological: Negative for dizziness, tremors, seizures, syncope, facial asymmetry, speech difficulty, weakness, light-headedness, numbness and headaches.   Hematological: Negative for adenopathy. Does not bruise/bleed easily.   Psychiatric/Behavioral: Negative for agitation, behavioral problems, confusion, decreased concentration, hallucinations, self-injury, sleep disturbance and suicidal ideas. The patient is not nervous/anxious.      Objective:    There were no vitals filed for this visit.  There is no height or weight on file to calculate BMI.  ECOG  (0) Fully active, able to carry on all predisease performance without restriction    Physical Exam:     Physical Exam  Constitutional:       General: He is not in acute distress.     Appearance: He is obese. He is not ill-appearing, toxic-appearing or diaphoretic.   HENT:      Head: Normocephalic and atraumatic.      Right Ear: External ear normal.      Left Ear: External ear normal.      Nose: Nose  normal.      Mouth/Throat:      Mouth: Mucous membranes are moist.      Pharynx: Oropharynx is clear.   Eyes:      General: No scleral icterus.        Right eye: No discharge.         Left eye: No discharge.      Conjunctiva/sclera: Conjunctivae normal.      Pupils: Pupils are equal, round, and reactive to light.   Cardiovascular:      Rate and Rhythm: Normal rate and regular rhythm.      Pulses: Normal pulses.      Heart sounds: Normal heart sounds. No murmur heard.    No friction rub. No gallop.   Pulmonary:      Effort: No respiratory distress.      Breath sounds: No stridor. No wheezing, rhonchi or rales.   Chest:      Chest wall: No tenderness.   Abdominal:      General: Bowel sounds are normal. There is no distension.      Palpations: Abdomen is soft. There is no mass.      Tenderness: There is no abdominal tenderness. There is no right CVA tenderness, left CVA tenderness, guarding or rebound.      Comments: Protuberant, soft and not tender. The spleen is not palpable because of the body habitus.    Musculoskeletal:         General: No swelling, tenderness, deformity or signs of injury.      Cervical back: No rigidity.      Right lower leg: No edema.      Left lower leg: No edema.   Lymphadenopathy:      Cervical: No cervical adenopathy.   Skin:     General: Skin is warm and dry.      Coloration: Skin is not jaundiced.      Findings: No bruising or rash.   Neurological:      General: No focal deficit present.      Mental Status: He is alert and oriented to person, place, and time.      Gait: Gait normal.   Psychiatric:         Mood and Affect: Mood normal.         Behavior: Behavior normal.         Thought Content: Thought content normal.         Judgment: Judgment normal.     I Sravan Greenwood MD performed the physical exam on 11/18/2022 as documented above.     Lab Results - Last 18 Months   Lab Units 09/28/22  0826 09/22/22  0818 09/19/22  0805   WBC 10*3/mm3 2.88* 2.52* 2.45*   HEMOGLOBIN g/dL 13.6 14.5  14.2   HEMATOCRIT % 40.2 42.3 41.8   PLATELETS 10*3/mm3 134* 127* 129*   MCV fL 87.2 86.5 87.3     Lab Results - Last 18 Months   Lab Units 09/15/22  0954 08/04/22  0852 09/16/21  0858   SODIUM mmol/L 138 137 136   POTASSIUM mmol/L 3.9 3.7 3.8   CHLORIDE mmol/L 102 102 101   CO2 mmol/L 28.0 26.0 24.6   BUN mg/dL 5* 4* 5*   CREATININE mg/dL 0.85 0.81 0.79   CALCIUM mg/dL 8.9 8.7 9.0   BILIRUBIN mg/dL 0.7 0.6 0.9   ALK PHOS U/L 72 73 70   ALT (SGPT) U/L 11 19 17   AST (SGOT) U/L 23 24 33   GLUCOSE mg/dL 100* 119* 84     Lab Results   Component Value Date    GLUCOSE 100 (H) 09/15/2022    BUN 5 (L) 09/15/2022    CREATININE 0.85 09/15/2022    EGFRIFNONA 107 09/16/2021    BCR 5.9 (L) 09/15/2022    K 3.9 09/15/2022    CO2 28.0 09/15/2022    CALCIUM 8.9 09/15/2022    ALBUMIN 4.00 09/15/2022    LABIL2 1.2 11/16/2018    AST 23 09/15/2022    ALT 11 09/15/2022     Uric Acid   Date Value Ref Range Status   08/04/2022 4.1 3.4 - 7.0 mg/dL Final     Assessment & Plan     Assessment:  1. Leukopenia and neutropenia: Secondary to splenomegaly. This in turn is due to cirrhosis of the liver. Discussed with him and his spouse at length. No progression of the cytopenias. No manifestation at this time. Will continue to follow.   2. Steatohepatitis and cirrhosis: Discussed with him and his spouse at length. He is trying to participate in the clinical trial that was offered and he will continue to follow with Dr. Raines.   3. He will see me in approximately 6 months.     Plan:  As above.     Sravan Greenwood MD on 11/18/2022 at 8:47 AM.

## 2022-11-18 ENCOUNTER — LAB (OUTPATIENT)
Dept: LAB | Facility: HOSPITAL | Age: 42
End: 2022-11-18

## 2022-11-18 ENCOUNTER — OFFICE VISIT (OUTPATIENT)
Dept: ONCOLOGY | Facility: CLINIC | Age: 42
End: 2022-11-18

## 2022-11-18 VITALS
HEART RATE: 72 BPM | HEIGHT: 64 IN | TEMPERATURE: 96.6 F | BODY MASS INDEX: 46.1 KG/M2 | SYSTOLIC BLOOD PRESSURE: 124 MMHG | OXYGEN SATURATION: 98 % | WEIGHT: 270 LBS | DIASTOLIC BLOOD PRESSURE: 77 MMHG

## 2022-11-18 DIAGNOSIS — D69.6 THROMBOCYTOPENIA: Primary | ICD-10-CM

## 2022-11-18 LAB
ALBUMIN SERPL-MCNC: 3.6 G/DL (ref 3.5–5.2)
ALBUMIN/GLOB SERPL: 1.2 G/DL
ALP SERPL-CCNC: 76 U/L (ref 39–117)
ALT SERPL W P-5'-P-CCNC: 11 U/L (ref 1–41)
ANION GAP SERPL CALCULATED.3IONS-SCNC: 7 MMOL/L (ref 5–15)
AST SERPL-CCNC: 21 U/L (ref 1–40)
BASOPHILS # BLD AUTO: 0 10*3/MM3 (ref 0–0.2)
BASOPHILS NFR BLD AUTO: 0 % (ref 0–1.5)
BILIRUB SERPL-MCNC: 0.7 MG/DL (ref 0–1.2)
BUN SERPL-MCNC: 4 MG/DL (ref 6–20)
BUN/CREAT SERPL: 5.1 (ref 7–25)
CALCIUM SPEC-SCNC: 8.8 MG/DL (ref 8.6–10.5)
CHLORIDE SERPL-SCNC: 102 MMOL/L (ref 98–107)
CO2 SERPL-SCNC: 28 MMOL/L (ref 22–29)
CREAT SERPL-MCNC: 0.78 MG/DL (ref 0.76–1.27)
DEPRECATED RDW RBC AUTO: 49.4 FL (ref 37–54)
EGFRCR SERPLBLD CKD-EPI 2021: 112.8 ML/MIN/1.73
EOSINOPHIL # BLD AUTO: 0 10*3/MM3 (ref 0–0.4)
EOSINOPHIL NFR BLD AUTO: 0 % (ref 0.3–6.2)
ERYTHROCYTE [DISTWIDTH] IN BLOOD BY AUTOMATED COUNT: 15.6 % (ref 12.3–15.4)
GLOBULIN UR ELPH-MCNC: 3 GM/DL
GLUCOSE SERPL-MCNC: 110 MG/DL (ref 65–99)
HCT VFR BLD AUTO: 39.8 % (ref 37.5–51)
HGB BLD-MCNC: 13.3 G/DL (ref 13–17.7)
HOLD SPECIMEN: NORMAL
LYMPHOCYTES # BLD AUTO: 0.81 10*3/MM3 (ref 0.7–3.1)
LYMPHOCYTES NFR BLD AUTO: 37.3 % (ref 19.6–45.3)
MCH RBC QN AUTO: 29.7 PG (ref 26.6–33)
MCHC RBC AUTO-ENTMCNC: 33.4 G/DL (ref 31.5–35.7)
MCV RBC AUTO: 88.8 FL (ref 79–97)
MONOCYTES # BLD AUTO: 0.28 10*3/MM3 (ref 0.1–0.9)
MONOCYTES NFR BLD AUTO: 12.9 % (ref 5–12)
NEUTROPHILS NFR BLD AUTO: 1.08 10*3/MM3 (ref 1.7–7)
NEUTROPHILS NFR BLD AUTO: 49.8 % (ref 42.7–76)
PLATELET # BLD AUTO: 131 10*3/MM3 (ref 140–450)
PMV BLD AUTO: 10 FL (ref 6–12)
POTASSIUM SERPL-SCNC: 3.7 MMOL/L (ref 3.5–5.2)
PROT SERPL-MCNC: 6.6 G/DL (ref 6–8.5)
RBC # BLD AUTO: 4.48 10*6/MM3 (ref 4.14–5.8)
SODIUM SERPL-SCNC: 137 MMOL/L (ref 136–145)
WBC NRBC COR # BLD: 2.17 10*3/MM3 (ref 3.4–10.8)

## 2022-11-18 PROCEDURE — 80053 COMPREHEN METABOLIC PANEL: CPT

## 2022-11-18 PROCEDURE — 99213 OFFICE O/P EST LOW 20 MIN: CPT | Performed by: INTERNAL MEDICINE

## 2022-11-18 PROCEDURE — 36415 COLL VENOUS BLD VENIPUNCTURE: CPT

## 2022-11-18 PROCEDURE — 85025 COMPLETE CBC W/AUTO DIFF WBC: CPT | Performed by: INTERNAL MEDICINE

## 2023-01-06 ENCOUNTER — OFFICE (AMBULATORY)
Dept: URBAN - METROPOLITAN AREA CLINIC 64 | Facility: CLINIC | Age: 43
End: 2023-01-06

## 2023-01-06 DIAGNOSIS — K80.20 CALCULUS OF GALLBLADDER WITHOUT CHOLECYSTITIS WITHOUT OBSTRU: ICD-10-CM

## 2023-01-06 DIAGNOSIS — K74.69 OTHER CIRRHOSIS OF LIVER: ICD-10-CM

## 2023-01-06 DIAGNOSIS — D72.819 DECREASED WHITE BLOOD CELL COUNT, UNSPECIFIED: ICD-10-CM

## 2023-01-06 PROCEDURE — 99214 OFFICE O/P EST MOD 30 MIN: CPT | Performed by: INTERNAL MEDICINE

## 2023-01-23 RX ORDER — LISINOPRIL 10 MG/1
10 TABLET ORAL DAILY
Qty: 90 TABLET | Refills: 1 | Status: SHIPPED | OUTPATIENT
Start: 2023-01-23

## 2023-01-23 RX ORDER — PRAMIPEXOLE DIHYDROCHLORIDE 0.12 MG/1
0.12 TABLET ORAL NIGHTLY
Qty: 180 TABLET | Refills: 0 | Status: SHIPPED | OUTPATIENT
Start: 2023-01-23

## 2023-01-23 RX ORDER — URSODIOL 500 MG/1
TABLET, FILM COATED ORAL
Qty: 180 TABLET | Refills: 1 | Status: SHIPPED | OUTPATIENT
Start: 2023-01-23

## 2023-02-01 ENCOUNTER — CLINICAL SUPPORT (OUTPATIENT)
Dept: FAMILY MEDICINE CLINIC | Facility: CLINIC | Age: 43
End: 2023-02-01
Payer: COMMERCIAL

## 2023-02-01 DIAGNOSIS — I10 HYPERTENSION, UNSPECIFIED TYPE: ICD-10-CM

## 2023-02-01 DIAGNOSIS — E78.2 MIXED HYPERLIPIDEMIA: Primary | ICD-10-CM

## 2023-02-01 LAB
DEPRECATED RDW RBC AUTO: 42 FL (ref 37–54)
ERYTHROCYTE [DISTWIDTH] IN BLOOD BY AUTOMATED COUNT: 13.3 % (ref 12.3–15.4)
HCT VFR BLD AUTO: 40.9 % (ref 37.5–51)
HGB BLD-MCNC: 13.7 G/DL (ref 13–17.7)
MCH RBC QN AUTO: 29.3 PG (ref 26.6–33)
MCHC RBC AUTO-ENTMCNC: 33.5 G/DL (ref 31.5–35.7)
MCV RBC AUTO: 87.6 FL (ref 79–97)
PLATELET # BLD AUTO: 147 10*3/MM3 (ref 140–450)
PMV BLD AUTO: 11.7 FL (ref 6–12)
RBC # BLD AUTO: 4.67 10*6/MM3 (ref 4.14–5.8)
WBC NRBC COR # BLD: 2.54 10*3/MM3 (ref 3.4–10.8)

## 2023-02-01 PROCEDURE — 85027 COMPLETE CBC AUTOMATED: CPT | Performed by: NURSE PRACTITIONER

## 2023-02-01 PROCEDURE — 84443 ASSAY THYROID STIM HORMONE: CPT | Performed by: NURSE PRACTITIONER

## 2023-02-01 PROCEDURE — 36415 COLL VENOUS BLD VENIPUNCTURE: CPT | Performed by: NURSE PRACTITIONER

## 2023-02-01 PROCEDURE — 80061 LIPID PANEL: CPT | Performed by: NURSE PRACTITIONER

## 2023-02-01 PROCEDURE — 80053 COMPREHEN METABOLIC PANEL: CPT | Performed by: NURSE PRACTITIONER

## 2023-02-01 NOTE — PROGRESS NOTES
Venipuncture Blood Specimen Collection  Venipuncture performed in the left arm by Melanie Nettles MA with good hemostasis. Patient tolerated the procedure well without complications.   02/01/23   Melanie Nettles MA

## 2023-02-02 LAB
ALBUMIN SERPL-MCNC: 3.6 G/DL (ref 3.5–5.2)
ALBUMIN/GLOB SERPL: 1 G/DL
ALP SERPL-CCNC: 72 U/L (ref 39–117)
ALT SERPL W P-5'-P-CCNC: 11 U/L (ref 1–41)
ANION GAP SERPL CALCULATED.3IONS-SCNC: 8.3 MMOL/L (ref 5–15)
AST SERPL-CCNC: 20 U/L (ref 1–40)
BILIRUB SERPL-MCNC: 0.7 MG/DL (ref 0–1.2)
BUN SERPL-MCNC: 5 MG/DL (ref 6–20)
BUN/CREAT SERPL: 5.4 (ref 7–25)
CALCIUM SPEC-SCNC: 9.1 MG/DL (ref 8.6–10.5)
CHLORIDE SERPL-SCNC: 99 MMOL/L (ref 98–107)
CHOLEST SERPL-MCNC: 144 MG/DL (ref 0–200)
CO2 SERPL-SCNC: 28.7 MMOL/L (ref 22–29)
CREAT SERPL-MCNC: 0.93 MG/DL (ref 0.76–1.27)
EGFRCR SERPLBLD CKD-EPI 2021: 103.8 ML/MIN/1.73
GLOBULIN UR ELPH-MCNC: 3.5 GM/DL
GLUCOSE SERPL-MCNC: 104 MG/DL (ref 65–99)
HDLC SERPL-MCNC: 25 MG/DL (ref 40–60)
LDLC SERPL CALC-MCNC: 100 MG/DL (ref 0–100)
LDLC/HDLC SERPL: 3.94 {RATIO}
POTASSIUM SERPL-SCNC: 4 MMOL/L (ref 3.5–5.2)
PROT SERPL-MCNC: 7.1 G/DL (ref 6–8.5)
SODIUM SERPL-SCNC: 136 MMOL/L (ref 136–145)
TRIGL SERPL-MCNC: 102 MG/DL (ref 0–150)
TSH SERPL DL<=0.05 MIU/L-ACNC: 1.65 UIU/ML (ref 0.27–4.2)
VLDLC SERPL-MCNC: 19 MG/DL (ref 5–40)

## 2023-02-06 ENCOUNTER — OFFICE VISIT (OUTPATIENT)
Dept: FAMILY MEDICINE CLINIC | Facility: CLINIC | Age: 43
End: 2023-02-06
Payer: COMMERCIAL

## 2023-02-06 VITALS
HEART RATE: 83 BPM | DIASTOLIC BLOOD PRESSURE: 74 MMHG | WEIGHT: 272.2 LBS | HEIGHT: 64 IN | BODY MASS INDEX: 46.47 KG/M2 | SYSTOLIC BLOOD PRESSURE: 126 MMHG | OXYGEN SATURATION: 96 %

## 2023-02-06 DIAGNOSIS — K58.0 IRRITABLE BOWEL SYNDROME WITH DIARRHEA: ICD-10-CM

## 2023-02-06 DIAGNOSIS — D70.9 NEUTROPENIA, UNSPECIFIED TYPE: ICD-10-CM

## 2023-02-06 DIAGNOSIS — K21.9 GASTROESOPHAGEAL REFLUX DISEASE WITHOUT ESOPHAGITIS: ICD-10-CM

## 2023-02-06 DIAGNOSIS — K75.81 NASH (NONALCOHOLIC STEATOHEPATITIS): ICD-10-CM

## 2023-02-06 DIAGNOSIS — I10 HYPERTENSION, UNSPECIFIED TYPE: ICD-10-CM

## 2023-02-06 DIAGNOSIS — G25.81 RESTLESS LEG: ICD-10-CM

## 2023-02-06 DIAGNOSIS — E78.2 MIXED HYPERLIPIDEMIA: ICD-10-CM

## 2023-02-06 DIAGNOSIS — G47.33 OBSTRUCTIVE SLEEP APNEA: Primary | ICD-10-CM

## 2023-02-06 PROCEDURE — 99214 OFFICE O/P EST MOD 30 MIN: CPT | Performed by: NURSE PRACTITIONER

## 2023-02-06 RX ORDER — ROSUVASTATIN CALCIUM 20 MG/1
20 TABLET, COATED ORAL NIGHTLY
Qty: 90 TABLET | Refills: 1 | Status: SHIPPED | OUTPATIENT
Start: 2023-02-06

## 2023-02-06 RX ORDER — OMEPRAZOLE 40 MG/1
40 CAPSULE, DELAYED RELEASE ORAL DAILY
Qty: 90 CAPSULE | Refills: 1 | Status: SHIPPED | OUTPATIENT
Start: 2023-02-06

## 2023-02-06 RX ORDER — FENOFIBRATE 145 MG/1
145 TABLET, COATED ORAL DAILY
Qty: 90 TABLET | Refills: 1 | Status: SHIPPED | OUTPATIENT
Start: 2023-02-06

## 2023-02-06 NOTE — PROGRESS NOTES
Chief Complaint  Chief Complaint   Patient presents with   • Follow-up     6 month follow up           Subjective          Farhad Khan presents to [facility] for   History of Present Illness     DALE, sleep study completed 8/21/2022, showed mild obstructive sleep apnea.  CPAP ordered, pt reports he forgot about it and has not heard from [facility] or called to check status.     HTN, stable on meds and takes as directed, denies chest pain, headache, shortness of air, palpitations and swelling of extremities.      Hyperlipidemia, atorvastatin was discontinued in the past due to elevated liver enzymes but normalized September 2021, he was started on Crestor and fenofibrate, taking medication daily as directed. The patient denies muscle aches, constipation, diarrhea, GI upset, fatigue, chest pain/pressure, exercise intolerance, dyspnea, palpitations, syncope and pedal edema.       DEGROOT, with splenomegaly and leukopenia, he is following with hematology and gastro.  He is planning to start a research study per Dr. Raines, continues Actigall for chronic cholelithiasis.      GERD/IBS, mostly stable, denies nausea, vomiting, constipation, he does c/o occasional abdominal pain and frequent diarrhea after eating.     Restless leg syndrome, stable on mirapex          The following portions of the patient's history were reviewed and updated as appropriate: allergies, current medications, past family history, past medical history, past social history, past surgical history and problem list.    Past Medical History:   Diagnosis Date   • B12 deficiency    • Chronic fatigue    • Dizziness    • Elevated LFTs    • Fatty liver    • GERD (gastroesophageal reflux disease)    • Gout    • Headache    • HSV infection    • Hyperglycemia    • Hyperlipidemia    • Hypertension    • Obesity    • Obstructive sleep apnea    • Onychomycosis    • Pain in joint, multiple sites    • Sebaceous cyst    • Skin nodule   "  • Snoring    • Vision changes      History reviewed. No pertinent surgical history.  Family History   Problem Relation Age of Onset   • Sleep apnea Father    • COPD Father    • Other Brother         Desmoid tumor of the abdomen   • Hypertension Other    • Rheum arthritis Other      Social History     Tobacco Use   • Smoking status: Never   • Smokeless tobacco: Never   Substance Use Topics   • Alcohol use: No       Current Outpatient Medications:   •  Cyanocobalamin (VITAMIN B-12 PO), Take  by mouth., Disp: , Rfl:   •  dicyclomine (BENTYL) 20 MG tablet, TAKE ONE TABLET BY MOUTH THREE TIMES A DAY AS NEEDED FOR BLOATING AND CRAMPING WITH A MEAL, Disp: 90 tablet, Rfl: 2  •  fenofibrate (TRICOR) 145 MG tablet, Take 1 tablet by mouth Daily., Disp: 90 tablet, Rfl: 1  •  lisinopril (PRINIVIL,ZESTRIL) 10 MG tablet, TAKE 1 TABLET BY MOUTH DAILY, Disp: 90 tablet, Rfl: 1  •  omeprazole (priLOSEC) 40 MG capsule, Take 1 capsule by mouth Daily., Disp: 90 capsule, Rfl: 1  •  pramipexole (MIRAPEX) 0.125 MG tablet, TAKE 1 TABLET BY MOUTH EVERY NIGHT FOR RESTLESS LEGS, Disp: 180 tablet, Rfl: 0  •  rosuvastatin (CRESTOR) 20 MG tablet, Take 1 tablet by mouth Every Night., Disp: 90 tablet, Rfl: 1  •  ursodiol (ACTIGALL) 500 MG tablet, TAKE 1 TABLET BY MOUTH TWICE DAILY, Disp: 180 tablet, Rfl: 1  •  VITAMIN E PO, Take  by mouth., Disp: , Rfl:     Objective   Vital Signs:   /74 (BP Location: Right arm, Patient Position: Sitting, Cuff Size: Large Adult)   Pulse 83   Ht 162.6 cm (64\")   Wt 123 kg (272 lb 3.2 oz)   SpO2 96%   BMI 46.72 kg/m²           Physical Exam  Vitals and nursing note reviewed.   Constitutional:       General: He is not in acute distress.     Appearance: Normal appearance. He is well-developed. He is obese. He is not ill-appearing or diaphoretic.   HENT:      Head: Normocephalic and atraumatic.   Eyes:      Pupils: Pupils are equal, round, and reactive to light.   Neck:      Thyroid: No thyromegaly. "   Cardiovascular:      Rate and Rhythm: Normal rate and regular rhythm.      Heart sounds: Normal heart sounds. No murmur heard.  Pulmonary:      Effort: Pulmonary effort is normal. No respiratory distress.      Breath sounds: Normal breath sounds. No wheezing or rhonchi.   Abdominal:      General: Bowel sounds are normal. There is no distension.      Palpations: Abdomen is soft.      Tenderness: There is no abdominal tenderness.   Musculoskeletal:         General: No swelling. Normal range of motion.      Cervical back: Normal range of motion.   Skin:     General: Skin is warm and dry.      Findings: No erythema.   Neurological:      General: No focal deficit present.      Mental Status: He is alert and oriented to person, place, and time. Mental status is at baseline.   Psychiatric:         Mood and Affect: Mood normal.         Behavior: Behavior normal.         Thought Content: Thought content normal.         Judgment: Judgment normal.          Result Review :     Clinical Support on 02/01/2023   Component Date Value Ref Range Status   • WBC 02/01/2023 2.54 (L)  3.40 - 10.80 10*3/mm3 Final   • RBC 02/01/2023 4.67  4.14 - 5.80 10*6/mm3 Final   • Hemoglobin 02/01/2023 13.7  13.0 - 17.7 g/dL Final   • Hematocrit 02/01/2023 40.9  37.5 - 51.0 % Final   • MCV 02/01/2023 87.6  79.0 - 97.0 fL Final   • MCH 02/01/2023 29.3  26.6 - 33.0 pg Final   • MCHC 02/01/2023 33.5  31.5 - 35.7 g/dL Final   • RDW 02/01/2023 13.3  12.3 - 15.4 % Final   • RDW-SD 02/01/2023 42.0  37.0 - 54.0 fl Final   • MPV 02/01/2023 11.7  6.0 - 12.0 fL Final   • Platelets 02/01/2023 147  140 - 450 10*3/mm3 Final   • Glucose 02/01/2023 104 (H)  65 - 99 mg/dL Final   • BUN 02/01/2023 5 (L)  6 - 20 mg/dL Final   • Creatinine 02/01/2023 0.93  0.76 - 1.27 mg/dL Final   • Sodium 02/01/2023 136  136 - 145 mmol/L Final   • Potassium 02/01/2023 4.0  3.5 - 5.2 mmol/L Final   • Chloride 02/01/2023 99  98 - 107 mmol/L Final   • CO2 02/01/2023 28.7  22.0 - 29.0  mmol/L Final   • Calcium 02/01/2023 9.1  8.6 - 10.5 mg/dL Final   • Total Protein 02/01/2023 7.1  6.0 - 8.5 g/dL Final   • Albumin 02/01/2023 3.6  3.5 - 5.2 g/dL Final   • ALT (SGPT) 02/01/2023 11  1 - 41 U/L Final   • AST (SGOT) 02/01/2023 20  1 - 40 U/L Final   • Alkaline Phosphatase 02/01/2023 72  39 - 117 U/L Final   • Total Bilirubin 02/01/2023 0.7  0.0 - 1.2 mg/dL Final   • Globulin 02/01/2023 3.5  gm/dL Final   • A/G Ratio 02/01/2023 1.0  g/dL Final   • BUN/Creatinine Ratio 02/01/2023 5.4 (L)  7.0 - 25.0 Final   • Anion Gap 02/01/2023 8.3  5.0 - 15.0 mmol/L Final   • eGFR 02/01/2023 103.8  >60.0 mL/min/1.73 Final   • Total Cholesterol 02/01/2023 144  0 - 200 mg/dL Final   • Triglycerides 02/01/2023 102  0 - 150 mg/dL Final   • HDL Cholesterol 02/01/2023 25 (L)  40 - 60 mg/dL Final   • LDL Cholesterol  02/01/2023 100  0 - 100 mg/dL Final   • VLDL Cholesterol 02/01/2023 19  5 - 40 mg/dL Final   • LDL/HDL Ratio 02/01/2023 3.94   Final   • TSH 02/01/2023 1.650  0.270 - 4.200 uIU/mL Final                  Class 3 Severe Obesity (BMI >=40). Obesity-related health conditions include the following: obstructive sleep apnea, hypertension, dyslipidemias and GERD. Obesity is unchanged. BMI is is above average; BMI management plan is completed. We discussed portion control and increasing exercise.           Assessment and Plan    Diagnoses and all orders for this visit:    1. Obstructive sleep apnea (Primary)    2. Irritable bowel syndrome with diarrhea    3. Mixed hyperlipidemia    4. Hypertension, unspecified type    5. Restless leg    6. Neutropenia, unspecified type (HCC)    7. DEGROOT (nonalcoholic steatohepatitis)    8. Gastroesophageal reflux disease without esophagitis    Other orders  -     fenofibrate (TRICOR) 145 MG tablet; Take 1 tablet by mouth Daily.  Dispense: 90 tablet; Refill: 1  -     omeprazole (priLOSEC) 40 MG capsule; Take 1 capsule by mouth Daily.  Dispense: 90 capsule; Refill: 1  -     rosuvastatin  (CRESTOR) 20 MG tablet; Take 1 tablet by mouth Every Night.  Dispense: 90 tablet; Refill: 1    -Labs reviewed and stable, LFTs within normal limits. Lipids stable with low HDL, recommended increasing exercise, add tuna/salmon to diet, cont Crestor and fenofibrate  -Keep follow-ups with hematology and gastroenterology  -Will be starting research study for DEGROOT per Dr. Raines  -Continue current medications  -We will fax sleep study, CPAP order and office visit notes back to [facility] for cpap. Pt provided [facility] telephone number to call and check status as well      I spent 30 minutes caring for Farahd Khan on this date of service. This time includes time spent by me in the following activities: preparing for the visit, reviewing tests, performing a medically appropriate examination and/or evaluation , counseling and educating the patient/family/caregiver, ordering medications, tests, or procedures and documenting information in the medical record        Follow Up     Return in about 6 months (around 8/6/2023) for Recheck, Annual physical. HTN panel prior to appt.  Patient was given instructions and counseling regarding his condition or for health maintenance advice. Please see specific information pulled into the AVS if appropriate.        Part of this note may be an electronic transcription/translation of spoken language to printed text using the Dragon Dictation System

## 2023-02-27 RX ORDER — SCOLOPAMINE TRANSDERMAL SYSTEM 1 MG/1
1 PATCH, EXTENDED RELEASE TRANSDERMAL
Qty: 4 EACH | Refills: 0 | Status: SHIPPED | OUTPATIENT
Start: 2023-02-27

## 2023-03-21 VITALS — HEART RATE: 75 BPM | SYSTOLIC BLOOD PRESSURE: 135 MMHG | DIASTOLIC BLOOD PRESSURE: 83 MMHG | HEIGHT: 64 IN

## 2023-04-11 ENCOUNTER — OFFICE (AMBULATORY)
Dept: URBAN - METROPOLITAN AREA CLINIC 64 | Facility: CLINIC | Age: 43
End: 2023-04-11

## 2023-04-11 VITALS
DIASTOLIC BLOOD PRESSURE: 80 MMHG | HEART RATE: 86 BPM | WEIGHT: 267 LBS | SYSTOLIC BLOOD PRESSURE: 130 MMHG | HEIGHT: 64 IN

## 2023-04-11 DIAGNOSIS — K74.69 OTHER CIRRHOSIS OF LIVER: ICD-10-CM

## 2023-04-11 DIAGNOSIS — D72.819 DECREASED WHITE BLOOD CELL COUNT, UNSPECIFIED: ICD-10-CM

## 2023-04-11 DIAGNOSIS — R11.2 NAUSEA WITH VOMITING, UNSPECIFIED: ICD-10-CM

## 2023-04-11 PROCEDURE — 99213 OFFICE O/P EST LOW 20 MIN: CPT | Performed by: NURSE PRACTITIONER

## 2023-04-12 ENCOUNTER — TELEPHONE (OUTPATIENT)
Dept: FAMILY MEDICINE CLINIC | Facility: CLINIC | Age: 43
End: 2023-04-12
Payer: COMMERCIAL

## 2023-04-12 NOTE — TELEPHONE ENCOUNTER
Faye from [facility] called in regarding pt CPAP order from 8/29/22. Thelma states they are cancelling the order as pt has not returned any of their phone calls, wanted to let provider know

## 2023-04-28 RX ORDER — OMEPRAZOLE 40 MG/1
40 CAPSULE, DELAYED RELEASE ORAL DAILY
Qty: 90 CAPSULE | Refills: 1 | OUTPATIENT
Start: 2023-04-28

## 2023-05-16 NOTE — PROGRESS NOTES
HEMATOLOGY ONCOLOGY OUTPATIENT FOLLOW-UP      Patient name: Farhad Khan  : 1980  MRN: [id number]  Primary Care Physician: Jacqueline Dwyer APRN  Referring Physician: No ref. provider found  Reason For Consult:     No chief complaint on file.    HPI:   History of Present Illness:  Farhad Khan is 42 y.o. male who presented to the office on 09/15/22 for consultation regarding    9/15/2022: Mr. Khan was referred for the investigation of leukopenia, neutropenia and thrombocytopenia.  He was first noted to have moderate leukopenia approximately 1 year before this visit.  Over the course of several months the leukopenia became somewhat worse and at some point his total white cells were less than 2.0.  Approximately 3 weeks before this visit this had improved and the total white cell count was up to 2.4 and this was associated to moderate neutropenia.  On this basis he was referred.  He had no new symptoms.  For some time, perhaps as many as 3 years, he had been experiencing fatigue and general malaise.  He had not had any changes in his work and he was able to fulfill all his duties.  He had been afebrile and without weight loss.  For at least 2 or 3 years he had maintained a similar weight.  He had been without chest pains or cough.  And denied expectoration or hemoptysis.  No dysphagia.  He had had no abdominal pain and denied diarrhea or dysuria.  He had not experienced any peripheral edema.  No skin rash.  For approximately 2 years he has been taking a combination of medications that included lisinopril, omeprazole and rosuvastatin.    2022: In the office to review the results of his tests.  Felt reasonably well and perhaps better than the previous week he had been much more fatigued at that time.  Eating well.  Reported frequent diarrhea but only intermittently.  Denied fevers.  Had had no chest pains and no increasing dyspnea.  No abdominal pain at the time but did  admit to intermittent right upper quadrant pain.  No edema.  The met laboratory exams were essentially unremarkable though they did confirm leukopenia, neutropenia and thrombocytopenia.  No suggestion of a bone marrow disorder, howeve.  The ultrasound of the abdomen confirmed the presence of a liver with cirrhotic morphology and splenomegaly.  It was felt that the explanation for his cytopenias was this splenomegaly.    11/18/2022: Feeling reasonably well. Continued to work and described being more active than before. Eating well and stable weight, though his wife described that he did not eat enough to justify his weight. Seen by the nurse practitioner at Dr. Knight's office. He was offered a clinical trial but did not offer anything else. The physical exam was unchanged. He persisted with moderate leukopenia and neutropenia, as well as thrombocytopenia. They had not changed and were not associated to any other problems. A decision was made to continue to follow.     5/19/2023: Without new symptoms.  Was seen at the gastroenterologist office and was placed on a trial testing, and on 2 drugs, semaglutide for the treatment of steatohepatitis.  Had lost approximately 12 pounds and since the commencement of the study and was experiencing some nausea as well as a reduction in his appetite.  He had been afebrile.  The exam disclosed no changes.  A dentulous, without any oral ulcers.  No palpable lymphadenopathy.  Abdomen protuberant.  Difficult to tell if the liver or spleen were enlarged as before.  No peripheral edema.  The white cell count had decreased to 1860/mm³, with neutrophils, as well lower than 1000/mm³ at 860/mm³.  The platelet count remained in the same range as before at 118,000/mm³.  A long discussion was had with him in regards to prevention of infections and neutropenic fever.  He was also asked to call immediately if he should have a fever.  Antinuclear antibodies and complement were requested and he was  asked to return in 3 weeks.  Also requested information on the clinical trial to see what other drug he could be receiving    Subjective:  • 5/19/2023: Completely asymptomatic.  Working full-time.  Eating well but having less appetite since the inclusion in the above-mentioned study.  No emesis but had had some nausea after the injections.  Denied fevers or nocturnal diaphoresis.  He had not had any chest pains or cough and was also free of dysphagia.  No diarrhea or dysuria.  No edema.    The following portions of the patient's history were reviewed and updated as appropriate: allergies, current medications, past family history, past medical history, past social history, past surgical history and problem list.    Past Medical History:   Diagnosis Date   • B12 deficiency    • Chronic fatigue    • Dizziness    • Elevated LFTs    • Fatty liver     NON ALCOHOLIC   • GERD (gastroesophageal reflux disease)    • Gout    • Headache    • HSV infection    • Hyperglycemia    • Hyperlipidemia    • Hypertension    • Obesity    • Obstructive sleep apnea    • Onychomycosis    • Pain in joint, multiple sites    • Sebaceous cyst    • Skin nodule     OF ARM, LEFT   • Snoring    • Vision changes      No past surgical history on file.      Current Outpatient Medications:   •  Cyanocobalamin (VITAMIN B-12 PO), Take  by mouth., Disp: , Rfl:   •  dicyclomine (BENTYL) 20 MG tablet, TAKE ONE TABLET BY MOUTH THREE TIMES A DAY AS NEEDED FOR BLOATING AND CRAMPING WITH A MEAL, Disp: 90 tablet, Rfl: 2  •  fenofibrate (TRICOR) 145 MG tablet, Take 1 tablet by mouth Daily., Disp: 90 tablet, Rfl: 1  •  lisinopril (PRINIVIL,ZESTRIL) 10 MG tablet, TAKE 1 TABLET BY MOUTH DAILY, Disp: 90 tablet, Rfl: 1  •  omeprazole (priLOSEC) 40 MG capsule, Take 1 capsule by mouth Daily., Disp: 90 capsule, Rfl: 1  •  pramipexole (MIRAPEX) 0.125 MG tablet, TAKE 1 TABLET BY MOUTH EVERY NIGHT FOR RESTLESS LEGS, Disp: 180 tablet, Rfl: 0  •  rosuvastatin (CRESTOR) 20 MG  tablet, Take 1 tablet by mouth Every Night., Disp: 90 tablet, Rfl: 1  •  Scopolamine 1 MG/3DAYS patch, Place 1 patch on the skin as directed by provider Every 72 (Seventy-Two) Hours. Place patch behind the ear, Disp: 4 each, Rfl: 0  •  ursodiol (ACTIGALL) 500 MG tablet, TAKE 1 TABLET BY MOUTH TWICE DAILY, Disp: 180 tablet, Rfl: 1  •  VITAMIN E PO, Take  by mouth., Disp: , Rfl:     Allergies   Allergen Reactions   • Sulfa Antibiotics Other (See Comments)     Rash, swelling, tingling, peeling.     Family History   Problem Relation Age of Onset   • Sleep apnea Father    • COPD Father    • Other Brother         Desmoid tumor of the abdomen   • Hypertension Other    • Rheum arthritis Other      Cancer-related family history is not on file.    Social History     Tobacco Use   • Smoking status: Never   • Smokeless tobacco: Never   Vaping Use   • Vaping Use: Never used   Substance Use Topics   • Alcohol use: No   • Drug use: No     Social History     Social History Narrative   • Not on file      ROS:     Review of Systems   Constitutional: Positive for fatigue. Negative for activity change, appetite change, chills, diaphoresis, fever and unexpected weight change.   HENT: Negative for congestion, dental problem, drooling, ear discharge, ear pain, facial swelling, hearing loss, mouth sores, nosebleeds, postnasal drip, rhinorrhea, sinus pressure, sinus pain, sneezing, sore throat, tinnitus, trouble swallowing and voice change.    Eyes: Negative for photophobia, pain, discharge, redness, itching and visual disturbance.   Respiratory: Negative for apnea, cough, choking, chest tightness, shortness of breath, wheezing and stridor.    Cardiovascular: Negative for chest pain, palpitations and leg swelling.   Gastrointestinal: Positive for nausea. Negative for abdominal distention, abdominal pain, anal bleeding, blood in stool, constipation, diarrhea, rectal pain and vomiting.   Endocrine: Negative for cold intolerance, heat  intolerance, polydipsia and polyuria.   Genitourinary: Negative for decreased urine volume, difficulty urinating, dysuria, flank pain, frequency, genital sores, hematuria and urgency.   Musculoskeletal: Negative for arthralgias, back pain, gait problem, joint swelling, myalgias, neck pain and neck stiffness.   Skin: Negative for color change, pallor and rash.   Neurological: Negative for dizziness, tremors, seizures, syncope, facial asymmetry, speech difficulty, weakness, light-headedness, numbness and headaches.   Hematological: Negative for adenopathy. Does not bruise/bleed easily.   Psychiatric/Behavioral: Negative for agitation, behavioral problems, confusion, decreased concentration, hallucinations, self-injury, sleep disturbance and suicidal ideas. The patient is not nervous/anxious.      Objective:    There were no vitals filed for this visit.  There is no height or weight on file to calculate BMI.  ECOG  (0) Fully active, able to carry on all predisease performance without restriction    Physical Exam:     Physical Exam  Constitutional:       General: He is not in acute distress.     Appearance: He is not ill-appearing, toxic-appearing or diaphoretic.      Comments: Alert oriented man who does not seem in distress.  Jaundice or pallor.   HENT:      Head: Normocephalic and atraumatic.      Right Ear: External ear normal.      Left Ear: External ear normal.      Nose: Nose normal.      Mouth/Throat:      Mouth: Mucous membranes are moist.      Pharynx: Oropharynx is clear.      Comments: Edentulous  Eyes:      General: No scleral icterus.        Right eye: No discharge.         Left eye: No discharge.      Conjunctiva/sclera: Conjunctivae normal.      Pupils: Pupils are equal, round, and reactive to light.   Cardiovascular:      Rate and Rhythm: Normal rate and regular rhythm.      Pulses: Normal pulses.      Heart sounds: Normal heart sounds. No murmur heard.    No friction rub. No gallop.   Pulmonary:       Effort: No respiratory distress.      Breath sounds: No stridor. No wheezing, rhonchi or rales.   Chest:      Chest wall: No tenderness.   Abdominal:      General: Bowel sounds are normal. There is no distension.      Palpations: Abdomen is soft. There is no mass.      Tenderness: There is no abdominal tenderness. There is no right CVA tenderness, left CVA tenderness, guarding or rebound.      Comments: Protuberant, soft and not tender. The spleen is not palpable because of the body habitus.    Musculoskeletal:         General: No swelling, tenderness, deformity or signs of injury.      Cervical back: No rigidity.      Right lower leg: No edema.      Left lower leg: No edema.   Lymphadenopathy:      Cervical: No cervical adenopathy.   Skin:     General: Skin is warm and dry.      Coloration: Skin is not jaundiced.      Findings: No bruising or rash.   Neurological:      General: No focal deficit present.      Mental Status: He is alert and oriented to person, place, and time.      Gait: Gait normal.   Psychiatric:         Mood and Affect: Mood normal.         Behavior: Behavior normal.         Thought Content: Thought content normal.         Judgment: Judgment normal.     DEMAR Greenwood MD performed the physical exam on 5/19/2023 as documented above.    Lab Results - Last 18 Months   Lab Units 02/01/23  0834 11/18/22  0813 09/28/22  0826   WBC 10*3/mm3 2.54* 2.17* 2.88*   HEMOGLOBIN g/dL 13.7 13.3 13.6   HEMATOCRIT % 40.9 39.8 40.2   PLATELETS 10*3/mm3 147 131* 134*   MCV fL 87.6 88.8 87.2     Lab Results - Last 18 Months   Lab Units 02/01/23  0834 11/18/22  0813 09/15/22  0954   SODIUM mmol/L 136 137 138   POTASSIUM mmol/L 4.0 3.7 3.9   CHLORIDE mmol/L 99 102 102   CO2 mmol/L 28.7 28.0 28.0   BUN mg/dL 5* 4* 5*   CREATININE mg/dL 0.93 0.78 0.85   CALCIUM mg/dL 9.1 8.8 8.9   BILIRUBIN mg/dL 0.7 0.7 0.7   ALK PHOS U/L 72 76 72   ALT (SGPT) U/L 11 11 11   AST (SGOT) U/L 20 21 23   GLUCOSE mg/dL 104* 110* 100*      Lab Results   Component Value Date    GLUCOSE 104 (H) 02/01/2023    BUN 5 (L) 02/01/2023    CREATININE 0.93 02/01/2023    EGFRIFNONA 107 09/16/2021    BCR 5.4 (L) 02/01/2023    K 4.0 02/01/2023    CO2 28.7 02/01/2023    CALCIUM 9.1 02/01/2023    ALBUMIN 3.6 02/01/2023    LABIL2 1.2 11/16/2018    AST 20 02/01/2023    ALT 11 02/01/2023     Uric Acid   Date Value Ref Range Status   08/04/2022 4.1 3.4 - 7.0 mg/dL Final     Assessment & Plan     Assessment:  1. Leukopenia and neutropenia: Apparently secondary to splenomegaly.  The reduction in the number of neutrophils is concerning.  Will investigate other causes and I have requested antinuclear antibodies as well as complements and asked him to return to see me approximately 3 weeks from now.  Discussed with him at length neutropenic precautions as well as importance of calling me if he does have a fever.  2. Thrombocytopenia: Secondary to splenomegaly.  No change at this time.  3. Steatohepatitis and cirrhosis: On a clinical trial testing semaglutide.  4. He is to see me in approximately 3 months.    Plan:  1.  As above.    Sravan Greenwood MD on 5/19/2023 at 8:50 AM

## 2023-05-19 ENCOUNTER — LAB (OUTPATIENT)
Dept: LAB | Facility: HOSPITAL | Age: 43
End: 2023-05-19
Payer: COMMERCIAL

## 2023-05-19 ENCOUNTER — OFFICE VISIT (OUTPATIENT)
Dept: ONCOLOGY | Facility: CLINIC | Age: 43
End: 2023-05-19
Payer: COMMERCIAL

## 2023-05-19 VITALS
SYSTOLIC BLOOD PRESSURE: 122 MMHG | WEIGHT: 260 LBS | HEART RATE: 80 BPM | DIASTOLIC BLOOD PRESSURE: 78 MMHG | BODY MASS INDEX: 44.63 KG/M2

## 2023-05-19 DIAGNOSIS — D69.6 THROMBOCYTOPENIA: Primary | ICD-10-CM

## 2023-05-19 LAB
ALBUMIN SERPL-MCNC: 3.5 G/DL (ref 3.5–5.2)
ALBUMIN SERPL-MCNC: 3.9 G/DL (ref 3.5–5.2)
ALBUMIN/GLOB SERPL: 1.3 G/DL
ALBUMIN/GLOB SERPL: 1.3 G/DL
ALP SERPL-CCNC: 101 U/L (ref 39–117)
ALP SERPL-CCNC: 99 U/L (ref 39–117)
ALT SERPL W P-5'-P-CCNC: 13 U/L (ref 1–41)
ALT SERPL W P-5'-P-CCNC: 9 U/L (ref 1–41)
ANION GAP SERPL CALCULATED.3IONS-SCNC: 10 MMOL/L (ref 5–15)
ANION GAP SERPL CALCULATED.3IONS-SCNC: 11 MMOL/L (ref 5–15)
AST SERPL-CCNC: 20 U/L (ref 1–40)
AST SERPL-CCNC: 21 U/L (ref 1–40)
BASOPHILS # BLD AUTO: 0 10*3/MM3 (ref 0–0.2)
BASOPHILS NFR BLD AUTO: 0 % (ref 0–1.5)
BILIRUB SERPL-MCNC: 0.9 MG/DL (ref 0–1.2)
BILIRUB SERPL-MCNC: 0.9 MG/DL (ref 0–1.2)
BUN SERPL-MCNC: 6 MG/DL (ref 6–20)
BUN SERPL-MCNC: 7 MG/DL (ref 6–20)
BUN/CREAT SERPL: 7.5 (ref 7–25)
BUN/CREAT SERPL: 8.5 (ref 7–25)
C3 SERPL-MCNC: 93 MG/DL (ref 82–167)
C4 SERPL-MCNC: 11 MG/DL (ref 14–44)
CALCIUM SPEC-SCNC: 8.5 MG/DL (ref 8.6–10.5)
CALCIUM SPEC-SCNC: 9.1 MG/DL (ref 8.6–10.5)
CHLORIDE SERPL-SCNC: 103 MMOL/L (ref 98–107)
CHLORIDE SERPL-SCNC: 103 MMOL/L (ref 98–107)
CO2 SERPL-SCNC: 25 MMOL/L (ref 22–29)
CO2 SERPL-SCNC: 25 MMOL/L (ref 22–29)
CREAT SERPL-MCNC: 0.8 MG/DL (ref 0.76–1.27)
CREAT SERPL-MCNC: 0.82 MG/DL (ref 0.76–1.27)
DEPRECATED RDW RBC AUTO: 50.3 FL (ref 37–54)
EGFRCR SERPLBLD CKD-EPI 2021: 111.1 ML/MIN/1.73
EGFRCR SERPLBLD CKD-EPI 2021: 111.9 ML/MIN/1.73
EOSINOPHIL # BLD AUTO: 0 10*3/MM3 (ref 0–0.4)
EOSINOPHIL NFR BLD AUTO: 0 % (ref 0.3–6.2)
ERYTHROCYTE [DISTWIDTH] IN BLOOD BY AUTOMATED COUNT: 15.4 % (ref 12.3–15.4)
GLOBULIN UR ELPH-MCNC: 2.7 GM/DL
GLOBULIN UR ELPH-MCNC: 3.1 GM/DL
GLUCOSE SERPL-MCNC: 110 MG/DL (ref 65–99)
GLUCOSE SERPL-MCNC: 93 MG/DL (ref 65–99)
HCT VFR BLD AUTO: 38.1 % (ref 37.5–51)
HGB BLD-MCNC: 13 G/DL (ref 13–17.7)
HOLD SPECIMEN: NORMAL
LYMPHOCYTES # BLD AUTO: 0.83 10*3/MM3 (ref 0.7–3.1)
LYMPHOCYTES NFR BLD AUTO: 44.6 % (ref 19.6–45.3)
MCH RBC QN AUTO: 30.9 PG (ref 26.6–33)
MCHC RBC AUTO-ENTMCNC: 34.1 G/DL (ref 31.5–35.7)
MCV RBC AUTO: 90.5 FL (ref 79–97)
MONOCYTES # BLD AUTO: 0.17 10*3/MM3 (ref 0.1–0.9)
MONOCYTES NFR BLD AUTO: 9.1 % (ref 5–12)
NEUTROPHILS NFR BLD AUTO: 0.86 10*3/MM3 (ref 1.7–7)
NEUTROPHILS NFR BLD AUTO: 46.3 % (ref 42.7–76)
PLATELET # BLD AUTO: 118 10*3/MM3 (ref 140–450)
PMV BLD AUTO: 10.3 FL (ref 6–12)
POTASSIUM SERPL-SCNC: 3.7 MMOL/L (ref 3.5–5.2)
POTASSIUM SERPL-SCNC: 3.9 MMOL/L (ref 3.5–5.2)
PROT SERPL-MCNC: 6.2 G/DL (ref 6–8.5)
PROT SERPL-MCNC: 7 G/DL (ref 6–8.5)
RBC # BLD AUTO: 4.21 10*6/MM3 (ref 4.14–5.8)
SODIUM SERPL-SCNC: 138 MMOL/L (ref 136–145)
SODIUM SERPL-SCNC: 139 MMOL/L (ref 136–145)
WBC NRBC COR # BLD: 1.86 10*3/MM3 (ref 3.4–10.8)

## 2023-05-19 PROCEDURE — 86160 COMPLEMENT ANTIGEN: CPT | Performed by: INTERNAL MEDICINE

## 2023-05-19 PROCEDURE — 86038 ANTINUCLEAR ANTIBODIES: CPT | Performed by: INTERNAL MEDICINE

## 2023-05-19 PROCEDURE — 36415 COLL VENOUS BLD VENIPUNCTURE: CPT

## 2023-05-19 PROCEDURE — 80053 COMPREHEN METABOLIC PANEL: CPT | Performed by: INTERNAL MEDICINE

## 2023-05-19 PROCEDURE — 85025 COMPLETE CBC W/AUTO DIFF WBC: CPT

## 2023-05-22 LAB — ANA SER QL: NEGATIVE

## 2023-05-30 LAB
ANNOTATION COMMENT IMP: NORMAL
ASSESSMENT OF LEUKOCYTES: NORMAL
CLINICAL INFO: NORMAL
GATING STRATEGY: NORMAL
IMMUNOPHENOTYPING STUDY: NORMAL
LABORATORY COMMENT REPORT: NORMAL
Lab: NORMAL
PATH INTERP SPEC-IMP: NORMAL
PATHOLOGIST NAME: NORMAL
SPECIMEN SOURCE: NORMAL
VIABLE CELLS NFR SPEC: NORMAL %

## 2023-06-08 NOTE — PROGRESS NOTES
HEMATOLOGY ONCOLOGY OUTPATIENT FOLLOW-UP      Patient name: Farhad Khan  : 1980  MRN: [id number]  Primary Care Physician: Jacqueline Dwyer APRN  Referring Physician: No ref. provider found  Reason For Consult:     No chief complaint on file.    HPI:   History of Present Illness:  Farhad Khan is 42 y.o. male who presented to the office on 09/15/22 for consultation regarding    9/15/2022: Mr. Khan was referred for the investigation of leukopenia, neutropenia and thrombocytopenia.  He was first noted to have moderate leukopenia approximately 1 year before this visit.  Over the course of several months the leukopenia became somewhat worse and at some point his total white cells were less than 2.0.  Approximately 3 weeks before this visit this had improved and the total white cell count was up to 2.4 and this was associated to moderate neutropenia.  On this basis he was referred.  He had no new symptoms.  For some time, perhaps as many as 3 years, he had been experiencing fatigue and general malaise.  He had not had any changes in his work and he was able to fulfill all his duties.  He had been afebrile and without weight loss.  For at least 2 or 3 years he had maintained a similar weight.  He had been without chest pains or cough.  And denied expectoration or hemoptysis.  No dysphagia.  He had had no abdominal pain and denied diarrhea or dysuria.  He had not experienced any peripheral edema.  No skin rash.  For approximately 2 years he has been taking a combination of medications that included lisinopril, omeprazole and rosuvastatin.    2022: In the office to review the results of his tests.  Felt reasonably well and perhaps better than the previous week he had been much more fatigued at that time.  Eating well.  Reported frequent diarrhea but only intermittently.  Denied fevers.  Had had no chest pains and no increasing dyspnea.  No abdominal pain at the time but did  admit to intermittent right upper quadrant pain.  No edema.  The met laboratory exams were essentially unremarkable though they did confirm leukopenia, neutropenia and thrombocytopenia.  No suggestion of a bone marrow disorder, howeve.  The ultrasound of the abdomen confirmed the presence of a liver with cirrhotic morphology and splenomegaly.  It was felt that the explanation for his cytopenias was this splenomegaly.    11/18/2022: Feeling reasonably well. Continued to work and described being more active than before. Eating well and stable weight, though his wife described that he did not eat enough to justify his weight. Seen by the nurse practitioner at Dr. Knight's office. He was offered a clinical trial but did not offer anything else. The physical exam was unchanged. He persisted with moderate leukopenia and neutropenia, as well as thrombocytopenia. They had not changed and were not associated to any other problems. A decision was made to continue to follow.     5/19/2023: Without new symptoms.  Was seen at the gastroenterologist office and was placed on a trial testing, and on 2 drugs, semaglutide for the treatment of steatohepatitis.  Had lost approximately 12 pounds and since the commencement of the study and was experiencing some nausea as well as a reduction in his appetite.  He had been afebrile.  The exam disclosed no changes.  A dentulous, without any oral ulcers.  No palpable lymphadenopathy.  Abdomen protuberant.  Difficult to tell if the liver or spleen were enlarged as before.  No peripheral edema.  The white cell count had decreased to 1860/mm³, with neutrophils, as well lower than 1000/mm³ at 860/mm³.  The platelet count remained in the same range as before at 118,000/mm³.  A long discussion was had with him in regards to prevention of infections and neutropenic fever.  He was also asked to call immediately if he should have a fever.  Antinuclear antibodies and complement were requested and he was  asked to return in 3 weeks.  Also requested information on the clinical trial to see what other drug he could be receiving.     6/9/2023: Feeling well and without new symptoms.  Continues to participate in a clinical trial and had some modifications to his doses.  He continues to lose weight and has been consistently losing approximately 5 pounds per week.  No appetite.  Frequently nauseated but no diarrhea.  Afebrile.  No chest pains or cough.  On exam no changes.  The laboratory exams were reviewed.  The neutropenia had resolved.  RADHA and complement unremarkable.  Discussed with him.    Subjective:  6/9/2023: Asymptomatic.  Having the expected effects of the treatment he is receiving.  He is frequently nauseated.  There is nauseous at times relieved by eating but others it is not.  He has no appetite.  This has resulted in progressive weight loss.  No chest pains or cough.  No abdominal pain or diarrhea and no dysuria.  No peripheral edema.  No skin rash.    The following portions of the patient's history were reviewed and updated as appropriate: allergies, current medications, past family history, past medical history, past social history, past surgical history and problem list.    Past Medical History:   Diagnosis Date    B12 deficiency     Chronic fatigue     Dizziness     Elevated LFTs     Fatty liver     NON ALCOHOLIC    GERD (gastroesophageal reflux disease)     Gout     Headache     HSV infection     Hyperglycemia     Hyperlipidemia     Hypertension     Obesity     Obstructive sleep apnea     Onychomycosis     Pain in joint, multiple sites     Sebaceous cyst     Skin nodule     OF ARM, LEFT    Snoring     Vision changes      No past surgical history on file.      Current Outpatient Medications:     Cyanocobalamin (VITAMIN B-12 PO), Take  by mouth., Disp: , Rfl:     dicyclomine (BENTYL) 20 MG tablet, TAKE ONE TABLET BY MOUTH THREE TIMES A DAY AS NEEDED FOR BLOATING AND CRAMPING WITH A MEAL, Disp: 90 tablet,  Rfl: 2    fenofibrate (TRICOR) 145 MG tablet, Take 1 tablet by mouth Daily., Disp: 90 tablet, Rfl: 1    lisinopril (PRINIVIL,ZESTRIL) 10 MG tablet, TAKE 1 TABLET BY MOUTH DAILY, Disp: 90 tablet, Rfl: 1    omeprazole (priLOSEC) 40 MG capsule, Take 1 capsule by mouth Daily., Disp: 90 capsule, Rfl: 1    pramipexole (MIRAPEX) 0.125 MG tablet, TAKE 1 TABLET BY MOUTH EVERY NIGHT FOR RESTLESS LEGS, Disp: 180 tablet, Rfl: 0    rosuvastatin (CRESTOR) 20 MG tablet, Take 1 tablet by mouth Every Night., Disp: 90 tablet, Rfl: 1    Scopolamine 1 MG/3DAYS patch, Place 1 patch on the skin as directed by provider Every 72 (Seventy-Two) Hours. Place patch behind the ear, Disp: 4 each, Rfl: 0    ursodiol (ACTIGALL) 500 MG tablet, TAKE 1 TABLET BY MOUTH TWICE DAILY, Disp: 180 tablet, Rfl: 1    VITAMIN E PO, Take  by mouth., Disp: , Rfl:     Allergies   Allergen Reactions    Sulfa Antibiotics Other (See Comments)     Rash, swelling, tingling, peeling.     Family History   Problem Relation Age of Onset    Sleep apnea Father     COPD Father     Other Brother         Desmoid tumor of the abdomen    Hypertension Other     Rheum arthritis Other      Cancer-related family history is not on file.    Social History     Tobacco Use    Smoking status: Never    Smokeless tobacco: Never   Vaping Use    Vaping Use: Never used   Substance Use Topics    Alcohol use: No    Drug use: No     Social History     Social History Narrative    Not on file      ROS:     Review of Systems   Constitutional:  Positive for fatigue. Negative for activity change, appetite change, chills, diaphoresis, fever and unexpected weight change.   HENT:  Negative for congestion, dental problem, drooling, ear discharge, ear pain, facial swelling, hearing loss, mouth sores, nosebleeds, postnasal drip, rhinorrhea, sinus pressure, sinus pain, sneezing, sore throat, tinnitus, trouble swallowing and voice change.    Eyes:  Negative for photophobia, pain, discharge, redness, itching  and visual disturbance.   Respiratory:  Negative for apnea, cough, choking, chest tightness, shortness of breath, wheezing and stridor.    Cardiovascular:  Negative for chest pain, palpitations and leg swelling.   Gastrointestinal:  Positive for nausea. Negative for abdominal distention, abdominal pain, anal bleeding, blood in stool, constipation, diarrhea, rectal pain and vomiting.   Endocrine: Negative for cold intolerance, heat intolerance, polydipsia and polyuria.   Genitourinary:  Negative for decreased urine volume, difficulty urinating, dysuria, flank pain, frequency, genital sores, hematuria and urgency.   Musculoskeletal:  Negative for arthralgias, back pain, gait problem, joint swelling, myalgias, neck pain and neck stiffness.   Skin:  Negative for color change, pallor and rash.   Neurological:  Negative for dizziness, tremors, seizures, syncope, facial asymmetry, speech difficulty, weakness, light-headedness, numbness and headaches.   Hematological:  Negative for adenopathy. Does not bruise/bleed easily.   Psychiatric/Behavioral:  Negative for agitation, behavioral problems, confusion, decreased concentration, hallucinations, self-injury, sleep disturbance and suicidal ideas. The patient is not nervous/anxious.    Objective:    There were no vitals filed for this visit.  There is no height or weight on file to calculate BMI.  ECOG  (0) Fully active, able to carry on all predisease performance without restriction    Physical Exam:     Physical Exam  Constitutional:       General: He is not in acute distress.     Appearance: He is not ill-appearing, toxic-appearing or diaphoretic.      Comments: Alert oriented man who does not seem in distress.  Jaundice or pallor.   HENT:      Head: Normocephalic and atraumatic.      Right Ear: External ear normal.      Left Ear: External ear normal.      Nose: Nose normal.      Mouth/Throat:      Mouth: Mucous membranes are moist.      Pharynx: Oropharynx is clear.       Comments: Edentulous  Eyes:      General: No scleral icterus.        Right eye: No discharge.         Left eye: No discharge.      Conjunctiva/sclera: Conjunctivae normal.      Pupils: Pupils are equal, round, and reactive to light.   Cardiovascular:      Rate and Rhythm: Normal rate and regular rhythm.      Pulses: Normal pulses.      Heart sounds: Normal heart sounds. No murmur heard.    No friction rub. No gallop.   Pulmonary:      Effort: No respiratory distress.      Breath sounds: No stridor. No wheezing, rhonchi or rales.   Chest:      Chest wall: No tenderness.   Abdominal:      General: Bowel sounds are normal. There is no distension.      Palpations: Abdomen is soft. There is no mass.      Tenderness: There is no abdominal tenderness. There is no right CVA tenderness, left CVA tenderness, guarding or rebound.      Comments: Protuberant, soft and not tender. The spleen is not palpable because of the body habitus.    Musculoskeletal:         General: No swelling, tenderness, deformity or signs of injury.      Cervical back: No rigidity.      Right lower leg: No edema.      Left lower leg: No edema.   Lymphadenopathy:      Cervical: No cervical adenopathy.   Skin:     General: Skin is warm and dry.      Coloration: Skin is not jaundiced.      Findings: No bruising or rash.   Neurological:      General: No focal deficit present.      Mental Status: He is alert and oriented to person, place, and time.      Gait: Gait normal.   Psychiatric:         Mood and Affect: Mood normal.         Behavior: Behavior normal.         Thought Content: Thought content normal.         Judgment: Judgment normal.   DEMAR Greenwood MD performed the physical exam on 6/9/2023 as documented above.    Lab Results - Last 18 Months   Lab Units 05/19/23  0746 02/01/23  0834 11/18/22  0813   WBC 10*3/mm3 1.86* 2.54* 2.17*   HEMOGLOBIN g/dL 13.0 13.7 13.3   HEMATOCRIT % 38.1 40.9 39.8   PLATELETS 10*3/mm3 118* 147 131*   MCV fL 90.5 87.6  88.8       Lab Results - Last 18 Months   Lab Units 05/19/23  0903 05/19/23  0746 02/01/23  0834   SODIUM mmol/L 139 138 136   POTASSIUM mmol/L 3.9 3.7 4.0   CHLORIDE mmol/L 103 103 99   CO2 mmol/L 25.0 25.0 28.7   BUN mg/dL 7 6 5*   CREATININE mg/dL 0.82 0.80 0.93   CALCIUM mg/dL 9.1 8.5* 9.1   BILIRUBIN mg/dL 0.9 0.9 0.7   ALK PHOS U/L 101 99 72   ALT (SGPT) U/L 13 9 11   AST (SGOT) U/L 21 20 20   GLUCOSE mg/dL 93 110* 104*       Lab Results   Component Value Date    GLUCOSE 93 05/19/2023    BUN 7 05/19/2023    CREATININE 0.82 05/19/2023    EGFRIFNONA 107 09/16/2021    BCR 8.5 05/19/2023    K 3.9 05/19/2023    CO2 25.0 05/19/2023    CALCIUM 9.1 05/19/2023    ALBUMIN 3.9 05/19/2023    LABIL2 1.2 11/16/2018    AST 21 05/19/2023    ALT 13 05/19/2023     Uric Acid   Date Value Ref Range Status   08/04/2022 4.1 3.4 - 7.0 mg/dL Final     Assessment & Plan     Assessment:  Leukopenia and neutropenia: Secondary to splenomegaly.  The neutropenia has improved.  His neutrophils are now within the normal range.  No intervention at this time.  I wonder if the last blood count revealed effects of the medications he is receiving on the trial.  Thrombocytopenia: Unchanged we will continue to monitor.  Steatohepatitis and cirrhosis: On a clinical trial testing semaglutide.  He will return to see me in approximately 3 months.    Plan:  1.  As above.    Sravan Greenwood MD on 6/9/2023 at 1112.

## 2023-06-09 ENCOUNTER — LAB (OUTPATIENT)
Dept: LAB | Facility: HOSPITAL | Age: 43
End: 2023-06-09
Payer: COMMERCIAL

## 2023-06-09 ENCOUNTER — OFFICE VISIT (OUTPATIENT)
Dept: ONCOLOGY | Facility: CLINIC | Age: 43
End: 2023-06-09
Payer: COMMERCIAL

## 2023-06-09 VITALS
HEIGHT: 64 IN | SYSTOLIC BLOOD PRESSURE: 123 MMHG | WEIGHT: 255.8 LBS | TEMPERATURE: 97.9 F | OXYGEN SATURATION: 97 % | HEART RATE: 78 BPM | DIASTOLIC BLOOD PRESSURE: 83 MMHG | BODY MASS INDEX: 43.67 KG/M2

## 2023-06-09 DIAGNOSIS — D69.6 THROMBOCYTOPENIA: Primary | ICD-10-CM

## 2023-06-09 DIAGNOSIS — D69.6 THROMBOCYTOPENIA: ICD-10-CM

## 2023-06-09 LAB
BASOPHILS # BLD AUTO: 0 10*3/MM3 (ref 0–0.2)
BASOPHILS NFR BLD AUTO: 0 % (ref 0–1.5)
DEPRECATED RDW RBC AUTO: 47.3 FL (ref 37–54)
EOSINOPHIL # BLD AUTO: 0 10*3/MM3 (ref 0–0.4)
EOSINOPHIL NFR BLD AUTO: 0 % (ref 0.3–6.2)
ERYTHROCYTE [DISTWIDTH] IN BLOOD BY AUTOMATED COUNT: 14.9 % (ref 12.3–15.4)
HCT VFR BLD AUTO: 40.2 % (ref 37.5–51)
HGB BLD-MCNC: 13.8 G/DL (ref 13–17.7)
LYMPHOCYTES # BLD AUTO: 0.77 10*3/MM3 (ref 0.7–3.1)
LYMPHOCYTES NFR BLD AUTO: 25.2 % (ref 19.6–45.3)
MCH RBC QN AUTO: 30.8 PG (ref 26.6–33)
MCHC RBC AUTO-ENTMCNC: 34.3 G/DL (ref 31.5–35.7)
MCV RBC AUTO: 89.7 FL (ref 79–97)
MONOCYTES # BLD AUTO: 0.26 10*3/MM3 (ref 0.1–0.9)
MONOCYTES NFR BLD AUTO: 8.5 % (ref 5–12)
NEUTROPHILS NFR BLD AUTO: 2.03 10*3/MM3 (ref 1.7–7)
NEUTROPHILS NFR BLD AUTO: 66.3 % (ref 42.7–76)
PLATELET # BLD AUTO: 108 10*3/MM3 (ref 140–450)
PMV BLD AUTO: 9.9 FL (ref 6–12)
RBC # BLD AUTO: 4.48 10*6/MM3 (ref 4.14–5.8)
WBC NRBC COR # BLD: 3.06 10*3/MM3 (ref 3.4–10.8)

## 2023-06-09 PROCEDURE — 85025 COMPLETE CBC W/AUTO DIFF WBC: CPT

## 2023-06-09 PROCEDURE — 36415 COLL VENOUS BLD VENIPUNCTURE: CPT

## 2023-07-24 DIAGNOSIS — E78.2 MIXED HYPERLIPIDEMIA: Primary | ICD-10-CM

## 2023-07-24 DIAGNOSIS — I10 HYPERTENSION, UNSPECIFIED TYPE: ICD-10-CM

## 2023-07-27 RX ORDER — LISINOPRIL 10 MG/1
10 TABLET ORAL DAILY
Qty: 90 TABLET | Refills: 1 | Status: SHIPPED | OUTPATIENT
Start: 2023-07-27

## 2023-07-27 RX ORDER — URSODIOL 500 MG/1
TABLET, FILM COATED ORAL
Qty: 180 TABLET | Refills: 1 | Status: SHIPPED | OUTPATIENT
Start: 2023-07-27

## 2023-07-27 RX ORDER — PRAMIPEXOLE DIHYDROCHLORIDE 0.12 MG/1
0.12 TABLET ORAL NIGHTLY
Qty: 180 TABLET | Refills: 0 | Status: SHIPPED | OUTPATIENT
Start: 2023-07-27

## 2023-07-31 ENCOUNTER — CLINICAL SUPPORT (OUTPATIENT)
Dept: FAMILY MEDICINE CLINIC | Facility: CLINIC | Age: 43
End: 2023-07-31
Payer: COMMERCIAL

## 2023-07-31 DIAGNOSIS — E78.2 MIXED HYPERLIPIDEMIA: Primary | ICD-10-CM

## 2023-07-31 PROCEDURE — 36415 COLL VENOUS BLD VENIPUNCTURE: CPT | Performed by: NURSE PRACTITIONER

## 2023-07-31 PROCEDURE — 85027 COMPLETE CBC AUTOMATED: CPT | Performed by: NURSE PRACTITIONER

## 2023-07-31 PROCEDURE — 84443 ASSAY THYROID STIM HORMONE: CPT | Performed by: NURSE PRACTITIONER

## 2023-07-31 PROCEDURE — 80053 COMPREHEN METABOLIC PANEL: CPT | Performed by: NURSE PRACTITIONER

## 2023-07-31 PROCEDURE — 80061 LIPID PANEL: CPT | Performed by: NURSE PRACTITIONER

## 2023-08-01 LAB
ALBUMIN SERPL-MCNC: 3.4 G/DL (ref 3.5–5.2)
ALBUMIN/GLOB SERPL: 1 G/DL
ALP SERPL-CCNC: 140 U/L (ref 39–117)
ALT SERPL W P-5'-P-CCNC: 8 U/L (ref 1–41)
ANION GAP SERPL CALCULATED.3IONS-SCNC: 14.4 MMOL/L (ref 5–15)
AST SERPL-CCNC: 16 U/L (ref 1–40)
BILIRUB SERPL-MCNC: 1.1 MG/DL (ref 0–1.2)
BUN SERPL-MCNC: 5 MG/DL (ref 6–20)
BUN/CREAT SERPL: 6.8 (ref 7–25)
CALCIUM SPEC-SCNC: 8.8 MG/DL (ref 8.6–10.5)
CHLORIDE SERPL-SCNC: 100 MMOL/L (ref 98–107)
CHOLEST SERPL-MCNC: 139 MG/DL (ref 0–200)
CO2 SERPL-SCNC: 25.6 MMOL/L (ref 22–29)
CREAT SERPL-MCNC: 0.73 MG/DL (ref 0.76–1.27)
DEPRECATED RDW RBC AUTO: 42.2 FL (ref 37–54)
EGFRCR SERPLBLD CKD-EPI 2021: 114.3 ML/MIN/1.73
ERYTHROCYTE [DISTWIDTH] IN BLOOD BY AUTOMATED COUNT: 13.3 % (ref 12.3–15.4)
GLOBULIN UR ELPH-MCNC: 3.5 GM/DL
GLUCOSE SERPL-MCNC: 118 MG/DL (ref 65–99)
HCT VFR BLD AUTO: 45.1 % (ref 37.5–51)
HDLC SERPL-MCNC: 18 MG/DL (ref 40–60)
HGB BLD-MCNC: 15.4 G/DL (ref 13–17.7)
LDLC SERPL CALC-MCNC: 93 MG/DL (ref 0–100)
LDLC/HDLC SERPL: 4.99 {RATIO}
MCH RBC QN AUTO: 29.7 PG (ref 26.6–33)
MCHC RBC AUTO-ENTMCNC: 34.1 G/DL (ref 31.5–35.7)
MCV RBC AUTO: 86.9 FL (ref 79–97)
PLATELET # BLD AUTO: 90 10*3/MM3 (ref 140–450)
PMV BLD AUTO: 11.9 FL (ref 6–12)
POTASSIUM SERPL-SCNC: 3.4 MMOL/L (ref 3.5–5.2)
PROT SERPL-MCNC: 6.9 G/DL (ref 6–8.5)
RBC # BLD AUTO: 5.19 10*6/MM3 (ref 4.14–5.8)
SODIUM SERPL-SCNC: 140 MMOL/L (ref 136–145)
TRIGL SERPL-MCNC: 156 MG/DL (ref 0–150)
TSH SERPL DL<=0.05 MIU/L-ACNC: 1.14 UIU/ML (ref 0.27–4.2)
VLDLC SERPL-MCNC: 28 MG/DL (ref 5–40)
WBC NRBC COR # BLD: 2.45 10*3/MM3 (ref 3.4–10.8)

## 2023-08-07 ENCOUNTER — OFFICE VISIT (OUTPATIENT)
Dept: FAMILY MEDICINE CLINIC | Facility: CLINIC | Age: 43
End: 2023-08-07
Payer: COMMERCIAL

## 2023-08-07 VITALS
HEIGHT: 64 IN | OXYGEN SATURATION: 93 % | SYSTOLIC BLOOD PRESSURE: 128 MMHG | WEIGHT: 234.8 LBS | BODY MASS INDEX: 40.08 KG/M2 | DIASTOLIC BLOOD PRESSURE: 89 MMHG | HEART RATE: 97 BPM

## 2023-08-07 DIAGNOSIS — I10 HYPERTENSION, UNSPECIFIED TYPE: ICD-10-CM

## 2023-08-07 DIAGNOSIS — J40 BRONCHITIS: ICD-10-CM

## 2023-08-07 DIAGNOSIS — K75.81 NASH (NONALCOHOLIC STEATOHEPATITIS): ICD-10-CM

## 2023-08-07 DIAGNOSIS — J30.2 SEASONAL ALLERGIC RHINITIS, UNSPECIFIED TRIGGER: ICD-10-CM

## 2023-08-07 DIAGNOSIS — G25.81 RESTLESS LEG: ICD-10-CM

## 2023-08-07 DIAGNOSIS — G47.33 OBSTRUCTIVE SLEEP APNEA: ICD-10-CM

## 2023-08-07 DIAGNOSIS — K21.9 GASTROESOPHAGEAL REFLUX DISEASE WITHOUT ESOPHAGITIS: ICD-10-CM

## 2023-08-07 DIAGNOSIS — E88.09 HYPOALBUMINEMIA: Primary | ICD-10-CM

## 2023-08-07 DIAGNOSIS — E78.2 MIXED HYPERLIPIDEMIA: ICD-10-CM

## 2023-08-07 DIAGNOSIS — D69.6 THROMBOCYTOPENIA: ICD-10-CM

## 2023-08-07 RX ORDER — DOXYCYCLINE HYCLATE 100 MG
100 TABLET ORAL 2 TIMES DAILY
Qty: 20 TABLET | Refills: 0 | Status: SHIPPED | OUTPATIENT
Start: 2023-08-07 | End: 2023-08-14

## 2023-08-07 RX ORDER — FENOFIBRATE 145 MG/1
145 TABLET, COATED ORAL DAILY
Qty: 90 TABLET | Refills: 1 | Status: SHIPPED | OUTPATIENT
Start: 2023-08-07

## 2023-08-07 RX ORDER — OMEPRAZOLE 40 MG/1
40 CAPSULE, DELAYED RELEASE ORAL DAILY
Qty: 90 CAPSULE | Refills: 1 | Status: SHIPPED | OUTPATIENT
Start: 2023-08-07

## 2023-08-07 RX ORDER — METHYLPREDNISOLONE 4 MG/1
TABLET ORAL
Qty: 21 TABLET | Refills: 0 | Status: SHIPPED | OUTPATIENT
Start: 2023-08-07

## 2023-08-07 RX ORDER — ROSUVASTATIN CALCIUM 20 MG/1
20 TABLET, COATED ORAL NIGHTLY
Qty: 90 TABLET | Refills: 1 | Status: SHIPPED | OUTPATIENT
Start: 2023-08-07

## 2023-08-07 NOTE — PROGRESS NOTES
Chief Complaint  Chief Complaint   Patient presents with    Annual Exam           Subjective          Farhad Khan presents to [facility] for   History of Present Illness    Patient presents for annual exam and to review labs collected on 7/31/2023    DALE, sleep study completed 8/21/2022, showed mild obstructive sleep apnea.  CPAP ordered, has not started, he is losing weight now and wishes to not start at this time.     HTN, stable on meds and takes as directed, denies chest pain, headache, shortness of air, palpitations and swelling of extremities.      Hyperlipidemia, patient is on Crestor and fenofibrate, he has not been very active due to severe nausea, taking medication daily as directed. The patient denies muscle aches, constipation, diarrhea, chest pain/pressure, exercise intolerance, dyspnea, palpitations, syncope and pedal edema.       DEGROOT, with splenomegaly and leukopenia, he is following with hematology and gastro.  He is in a research study per Dr. Raines, he was started on Ozempic and another study drug to help with hepatic steatosis, he reports nausea, vomiting and unable to keep food down most days, Zofran minimally effective, he is now on Reglan twice daily, has lost approximately 40 pounds since our last visit in February, continues Actigall for chronic cholelithiasis.       GERD/IBS, reports fatigue and frequent nausea, vomiting and decreased appetite since starting Ozempic, denies constipation, diarrhea, he is no longer using dicyclomine, started reglan and is helping with nausea and keeping food down better for the last week.  Gastro is planning to repeat EGD/colonoscopy February 2024     Restless leg syndrome, stable on mirapex    He had covid 1 year ago, still can't smell, since on ozempic s/s are worse, was briefly on zofran didn't help.     Over the last few weeks allergy symptoms have progressed into a productive cough with yellow/green sputum, cough  "is worse at night.  Per gastro he is unable to take any \"Myacin\" family drugs      The following portions of the patient's history were reviewed and updated as appropriate: allergies, current medications, past family history, past medical history, past social history, past surgical history and problem list.    Past Medical History:   Diagnosis Date    B12 deficiency     Chronic fatigue     Dizziness     Elevated LFTs     Fatty liver     GERD (gastroesophageal reflux disease)     Gout     Headache     HSV infection     Hyperglycemia     Hyperlipidemia     Hypertension     Obesity     Obstructive sleep apnea     Onychomycosis     Pain in joint, multiple sites     Sebaceous cyst     Skin nodule     Snoring     Vision changes      History reviewed. No pertinent surgical history.  Family History   Problem Relation Age of Onset    Sleep apnea Father     COPD Father     Other Brother         Desmoid tumor of the abdomen    Hypertension Other     Rheum arthritis Other      Social History     Tobacco Use    Smoking status: Never    Smokeless tobacco: Never   Substance Use Topics    Alcohol use: No       Current Outpatient Medications:     Cyanocobalamin (VITAMIN B-12 PO), Take  by mouth., Disp: , Rfl:     fenofibrate (TRICOR) 145 MG tablet, Take 1 tablet by mouth Daily., Disp: 90 tablet, Rfl: 1    lisinopril (PRINIVIL,ZESTRIL) 10 MG tablet, TAKE 1 TABLET BY MOUTH DAILY, Disp: 90 tablet, Rfl: 1    omeprazole (priLOSEC) 40 MG capsule, Take 1 capsule by mouth Daily., Disp: 90 capsule, Rfl: 1    pramipexole (MIRAPEX) 0.125 MG tablet, TAKE 1 TABLET BY MOUTH EVERY NIGHT FOR RESTLESS LEGS, Disp: 180 tablet, Rfl: 0    rosuvastatin (CRESTOR) 20 MG tablet, Take 1 tablet by mouth Every Night., Disp: 90 tablet, Rfl: 1    Semaglutide (OZEMPIC, 2 MG/DOSE, SC), Inject  under the skin into the appropriate area as directed., Disp: , Rfl:     ursodiol (ACTIGALL) 500 MG tablet, TAKE 1 TABLET BY MOUTH TWICE DAILY, Disp: 180 tablet, Rfl: 1    " "VITAMIN E PO, Take  by mouth., Disp: , Rfl:     doxycycline (VIBRAMYICN) 100 MG tablet, Take 1 tablet by mouth 2 (Two) Times a Day for 7 days., Disp: 20 tablet, Rfl: 0    methylPREDNISolone (MEDROL) 4 MG dose pack, Take as directed on package instructions., Disp: 21 tablet, Rfl: 0    Objective   Vital Signs:   /89   Pulse 97   Ht 162.6 cm (64\")   Wt 107 kg (234 lb 12.8 oz)   SpO2 93%   BMI 40.30 kg/mý           Physical Exam  Constitutional:       General: He is not in acute distress.     Appearance: Normal appearance. He is well-developed. He is not ill-appearing or diaphoretic.   HENT:      Head: Normocephalic.   Eyes:      Conjunctiva/sclera: Conjunctivae normal.      Pupils: Pupils are equal, round, and reactive to light.   Neck:      Thyroid: No thyromegaly.      Vascular: No JVD.   Cardiovascular:      Rate and Rhythm: Normal rate and regular rhythm.      Heart sounds: Normal heart sounds. No murmur heard.  Pulmonary:      Effort: Pulmonary effort is normal. No respiratory distress.      Breath sounds: Wheezing and rhonchi present.   Abdominal:      General: Bowel sounds are normal. There is no distension.      Palpations: Abdomen is soft.      Tenderness: There is no abdominal tenderness.   Musculoskeletal:         General: No swelling or tenderness. Normal range of motion.      Cervical back: Normal range of motion and neck supple. No tenderness.   Lymphadenopathy:      Cervical: No cervical adenopathy.   Skin:     General: Skin is warm and dry.      Coloration: Skin is not jaundiced.      Findings: No erythema or rash.   Neurological:      General: No focal deficit present.      Mental Status: He is alert and oriented to person, place, and time. Mental status is at baseline.      Sensory: No sensory deficit.   Psychiatric:         Mood and Affect: Mood normal.         Behavior: Behavior normal.         Thought Content: Thought content normal.         Judgment: Judgment normal.        Result " Review :     No visits with results within 7 Day(s) from this visit.   Latest known visit with results is:   Orders Only on 07/24/2023   Component Date Value Ref Range Status    WBC 07/31/2023 2.45 (L)  3.40 - 10.80 10*3/mm3 Final    RBC 07/31/2023 5.19  4.14 - 5.80 10*6/mm3 Final    Hemoglobin 07/31/2023 15.4  13.0 - 17.7 g/dL Final    Hematocrit 07/31/2023 45.1  37.5 - 51.0 % Final    MCV 07/31/2023 86.9  79.0 - 97.0 fL Final    MCH 07/31/2023 29.7  26.6 - 33.0 pg Final    MCHC 07/31/2023 34.1  31.5 - 35.7 g/dL Final    RDW 07/31/2023 13.3  12.3 - 15.4 % Final    RDW-SD 07/31/2023 42.2  37.0 - 54.0 fl Final    MPV 07/31/2023 11.9  6.0 - 12.0 fL Final    Platelets 07/31/2023 90 (L)  140 - 450 10*3/mm3 Final    Glucose 07/31/2023 118 (H)  65 - 99 mg/dL Final    BUN 07/31/2023 5 (L)  6 - 20 mg/dL Final    Creatinine 07/31/2023 0.73 (L)  0.76 - 1.27 mg/dL Final    Sodium 07/31/2023 140  136 - 145 mmol/L Final    Potassium 07/31/2023 3.4 (L)  3.5 - 5.2 mmol/L Final    Chloride 07/31/2023 100  98 - 107 mmol/L Final    CO2 07/31/2023 25.6  22.0 - 29.0 mmol/L Final    Calcium 07/31/2023 8.8  8.6 - 10.5 mg/dL Final    Total Protein 07/31/2023 6.9  6.0 - 8.5 g/dL Final    Albumin 07/31/2023 3.4 (L)  3.5 - 5.2 g/dL Final    ALT (SGPT) 07/31/2023 8  1 - 41 U/L Final    AST (SGOT) 07/31/2023 16  1 - 40 U/L Final    Alkaline Phosphatase 07/31/2023 140 (H)  39 - 117 U/L Final    Total Bilirubin 07/31/2023 1.1  0.0 - 1.2 mg/dL Final    Globulin 07/31/2023 3.5  gm/dL Final    A/G Ratio 07/31/2023 1.0  g/dL Final    BUN/Creatinine Ratio 07/31/2023 6.8 (L)  7.0 - 25.0 Final    Anion Gap 07/31/2023 14.4  5.0 - 15.0 mmol/L Final    eGFR 07/31/2023 114.3  >60.0 mL/min/1.73 Final    Total Cholesterol 07/31/2023 139  0 - 200 mg/dL Final    Triglycerides 07/31/2023 156 (H)  0 - 150 mg/dL Final    HDL Cholesterol 07/31/2023 18 (L)  40 - 60 mg/dL Final    LDL Cholesterol  07/31/2023 93  0 - 100 mg/dL Final    VLDL Cholesterol 07/31/2023 28   5 - 40 mg/dL Final    LDL/HDL Ratio 07/31/2023 4.99   Final    TSH 07/31/2023 1.140  0.270 - 4.200 uIU/mL Final                              Assessment and Plan    Diagnoses and all orders for this visit:    1. Hypoalbuminemia (Primary)    2. Mixed hyperlipidemia    3. Hypertension, unspecified type    4. Obstructive sleep apnea    5. DEGROOT (nonalcoholic steatohepatitis)    6. Thrombocytopenia    7. Restless leg    8. Gastroesophageal reflux disease without esophagitis    9. Seasonal allergic rhinitis, unspecified trigger    10. Bronchitis    Other orders  -     fenofibrate (TRICOR) 145 MG tablet; Take 1 tablet by mouth Daily.  Dispense: 90 tablet; Refill: 1  -     omeprazole (priLOSEC) 40 MG capsule; Take 1 capsule by mouth Daily.  Dispense: 90 capsule; Refill: 1  -     rosuvastatin (CRESTOR) 20 MG tablet; Take 1 tablet by mouth Every Night.  Dispense: 90 tablet; Refill: 1  -     doxycycline (VIBRAMYICN) 100 MG tablet; Take 1 tablet by mouth 2 (Two) Times a Day for 7 days.  Dispense: 20 tablet; Refill: 0  -     methylPREDNISolone (MEDROL) 4 MG dose pack; Take as directed on package instructions.  Dispense: 21 tablet; Refill: 0        Conditions mostly stable, rf meds as above  Increase protein in diet, continue reglan BID, Zofran as needed  Labs reviewed with patient  Cont with gastro for DEGROOT and research study  Consider decreasing crestor in future if HDL improves, try to be more active, cont HHD  Cont current med regimen  Start doxycycline and Medrol Dosepak, take Doxy with food, call if develops worsening of nausea  Age appropriate preventative counseling provided, including healthy lifestyle modifications and exercise      I spent 30 minutes caring for Farhad Khan on this date of service. This time includes time spent by me in the following activities: preparing for the visit, reviewing tests, performing a medically appropriate examination and/or evaluation , counseling and educating the  patient/family/caregiver, ordering medications, tests, or procedures and documenting information in the medical record          Follow Up     Return in about 6 months (around 2/7/2024) for Recheck HTN, HLD, HTN panel and PSA prior to appt.  Patient was given instructions and counseling regarding his condition or for health maintenance advice. Please see specific information pulled into the AVS if appropriate.        Part of this note may be an electronic transcription/translation of spoken language to printed text using the Dragon Dictation System

## 2023-08-24 ENCOUNTER — OFFICE (AMBULATORY)
Dept: URBAN - METROPOLITAN AREA CLINIC 64 | Facility: CLINIC | Age: 43
End: 2023-08-24

## 2023-08-24 VITALS
WEIGHT: 228 LBS | DIASTOLIC BLOOD PRESSURE: 89 MMHG | HEART RATE: 86 BPM | SYSTOLIC BLOOD PRESSURE: 125 MMHG | HEIGHT: 64 IN

## 2023-08-24 DIAGNOSIS — R11.2 NAUSEA WITH VOMITING, UNSPECIFIED: ICD-10-CM

## 2023-08-24 DIAGNOSIS — K74.69 OTHER CIRRHOSIS OF LIVER: ICD-10-CM

## 2023-08-24 DIAGNOSIS — D72.819 DECREASED WHITE BLOOD CELL COUNT, UNSPECIFIED: ICD-10-CM

## 2023-08-24 PROCEDURE — 99214 OFFICE O/P EST MOD 30 MIN: CPT | Performed by: INTERNAL MEDICINE

## 2023-08-24 RX ORDER — LACTULOSE 10 G/15ML
SOLUTION ORAL; RECTAL
Qty: 900 | Refills: 3 | Status: COMPLETED
Start: 2023-08-24 | End: 2023-10-19

## 2023-08-25 RX ORDER — FENOFIBRATE 145 MG/1
145 TABLET, COATED ORAL DAILY
Qty: 90 TABLET | Refills: 1 | Status: SHIPPED | OUTPATIENT
Start: 2023-08-25

## 2023-09-22 ENCOUNTER — TELEPHONE (OUTPATIENT)
Dept: ONCOLOGY | Facility: CLINIC | Age: 43
End: 2023-09-22
Payer: COMMERCIAL

## 2023-09-22 NOTE — TELEPHONE ENCOUNTER
"  Caller: Farhad Khan \"Pedro\"    Relationship: Self    Best call back number: [phone]    What is the best time to reach you: ANYTIME    Who are you requesting to speak with (clinical staff, provider, specific staff member): CLINICAL    What was the call regarding: PATIENT IS HAVING LABS PERFORMED 10/4. HE HAD LABS DONE 9/20 AND HIS WHITE BLOOD CELL COUNT WAS LOW AT 1.9. HE WANTS TO KNOW IF HE SHOULD KEEP HIS APPT WITH DR SCHAFFER 9/29. PLEASE CALL TO ADVISE.   "

## 2023-09-22 NOTE — TELEPHONE ENCOUNTER
GAYATRI SCHAFFER, HE WANTS PATIENT TO KEEP APPOINTMENT ON 09/29.    GAYATRI PATIENT, PATIENT AGREED TO KEEP FOLLOW UP APPOINTMENT ON 09/29.

## 2023-09-27 NOTE — PROGRESS NOTES
HEMATOLOGY ONCOLOGY OUTPATIENT FOLLOW-UP      Patient name: Farhad Khan  : 1980  MRN: [id number]  Primary Care Physician: Jacqueline Dwyer APRN  Referring Physician: No ref. provider found  Reason For Consult:     Chief Complaint   Patient presents with    Follow-up     Thrombocytopenia     HPI:   History of Present Illness:  Farhad Khan is 43 y.o. male who presented to the office on 09/15/22 for consultation regarding    9/15/2022: Mr. Khan was referred for the investigation of leukopenia, neutropenia and thrombocytopenia.  He was first noted to have moderate leukopenia approximately 1 year before this visit.  Over the course of several months the leukopenia became somewhat worse and at some point his total white cells were less than 2.0.  Approximately 3 weeks before this visit this had improved and the total white cell count was up to 2.4 and this was associated to moderate neutropenia.  On this basis he was referred.  He had no new symptoms.  For some time, perhaps as many as 3 years, he had been experiencing fatigue and general malaise.  He had not had any changes in his work and he was able to fulfill all his duties.  He had been afebrile and without weight loss.  For at least 2 or 3 years he had maintained a similar weight.  He had been without chest pains or cough.  And denied expectoration or hemoptysis.  No dysphagia.  He had had no abdominal pain and denied diarrhea or dysuria.  He had not experienced any peripheral edema.  No skin rash.  For approximately 2 years he has been taking a combination of medications that included lisinopril, omeprazole and rosuvastatin.    2022: In the office to review the results of his tests.  Felt reasonably well and perhaps better than the previous week he had been much more fatigued at that time.  Eating well.  Reported frequent diarrhea but only intermittently.  Denied fevers.  Had had no chest pains and no increasing  dyspnea.  No abdominal pain at the time but did admit to intermittent right upper quadrant pain.  No edema.  The met laboratory exams were essentially unremarkable though they did confirm leukopenia, neutropenia and thrombocytopenia.  No suggestion of a bone marrow disorder, howeve.  The ultrasound of the abdomen confirmed the presence of a liver with cirrhotic morphology and splenomegaly.  It was felt that the explanation for his cytopenias was this splenomegaly.    11/18/2022: Feeling reasonably well. Continued to work and described being more active than before. Eating well and stable weight, though his wife described that he did not eat enough to justify his weight. Seen by the nurse practitioner at Dr. Knight's office. He was offered a clinical trial but did not offer anything else. The physical exam was unchanged. He persisted with moderate leukopenia and neutropenia, as well as thrombocytopenia. They had not changed and were not associated to any other problems. A decision was made to continue to follow.     5/19/2023: Without new symptoms.  Was seen at the gastroenterologist office and was placed on a trial testing, and on 2 drugs, semaglutide for the treatment of steatohepatitis.  Had lost approximately 12 pounds and since the commencement of the study and was experiencing some nausea as well as a reduction in his appetite.  He had been afebrile.  The exam disclosed no changes.  A dentulous, without any oral ulcers.  No palpable lymphadenopathy.  Abdomen protuberant.  Difficult to tell if the liver or spleen were enlarged as before.  No peripheral edema.  The white cell count had decreased to 1860/mm³, with neutrophils, as well lower than 1000/mm³ at 860/mm³.  The platelet count remained in the same range as before at 118,000/mm³.  A long discussion was had with him in regards to prevention of infections and neutropenic fever.  He was also asked to call immediately if he should have a fever.  Antinuclear  antibodies and complement were requested and he was asked to return in 3 weeks.  Also requested information on the clinical trial to see what other drug he could be receiving.     6/9/2023: Feeling well and without new symptoms.  Continues to participate in a clinical trial and had some modifications to his doses.  He continues to lose weight and has been consistently losing approximately 5 pounds per week.  No appetite.  Frequently nauseated but no diarrhea.  Afebrile.  No chest pains or cough.  On exam no changes.  The laboratory exams were reviewed.  The neutropenia had resolved.  RADHA and complement unremarkable.  Discussed with him.    9/29/2023: Not feeling well.  Nauseated.  Unable to eat.  Losing weight rapidly.  This started with an increase in the dose of the study medications that he has been receiving for the treatment of his steatohepatitis.  A few days ago he received some intravenous fluids and the dose of the medications was reduced.  However, in spite of that, he has persisted with the same symptoms.  He has been afebrile.  On exam he is well-hydrated.  Has lost a large amount of weight.  No jaundice.  The lungs are clear.  The heart is regular.  The abdomen is soft.  Liver and spleen are nonenlarged.  No edema.  The laboratory exams reported a white blood cell count of 2640/mm³.  He had minor absolute lymphocytopenia, however, his neutrophil count was well within the normal range.  Hemoglobin normal at 16.7 g/dL with normocytic red cells and platelets improved at 131,000/mm³.  A decision was made to continue to observe without intervention.  A discussion was had with him about his symptoms.  He had been tempted to discontinue the study medications.  Given that he has not been able to eat and that he is constantly vomiting it probably would be reasonable to stop.    Subjective:  9/29/2023: Not feeling well as above.  Very fatigued and weak.  Eating very little.  He is wife confirmed this and reported  that for example yesterday he ate a few bites of macaroni and cheese and have of a pop tart.  Continuously nauseated and vomiting even water.  No chest pains.  No dyspnea or cough.  Remains free of abdominal pain.  Has had no diarrhea.  No edema.    The following portions of the patient's history were reviewed and updated as appropriate: allergies, current medications, past family history, past medical history, past social history, past surgical history and problem list.    Past Medical History:   Diagnosis Date    B12 deficiency     Chronic fatigue     Dizziness     Elevated LFTs     Fatty liver     NON ALCOHOLIC    GERD (gastroesophageal reflux disease)     Gout     Headache     HSV infection     Hyperglycemia     Hyperlipidemia     Hypertension     Obesity     Obstructive sleep apnea     Onychomycosis     Pain in joint, multiple sites     Sebaceous cyst     Skin nodule     OF ARM, LEFT    Snoring     Vision changes      No past surgical history on file.      Current Outpatient Medications:     Cyanocobalamin (VITAMIN B-12 PO), Take  by mouth., Disp: , Rfl:     fenofibrate (TRICOR) 145 MG tablet, TAKE 1 TABLET BY MOUTH DAILY, Disp: 90 tablet, Rfl: 1    lisinopril (PRINIVIL,ZESTRIL) 10 MG tablet, TAKE 1 TABLET BY MOUTH DAILY, Disp: 90 tablet, Rfl: 1    metoclopramide (REGLAN) 5 MG tablet, TAKE 1 TABLET BY MOUTH 2 TIMES A DAY FOR 30 DAYS, Disp: , Rfl:     omeprazole (priLOSEC) 40 MG capsule, Take 1 capsule by mouth Daily., Disp: 90 capsule, Rfl: 1    ondansetron ODT (ZOFRAN-ODT) 4 MG disintegrating tablet, DISSOLVE 1 TABLET IN MOUTH UNDER THE TONGUE 3 TIMES A DAY AS NEEDED FOR 10 DAYS, Disp: , Rfl:     pramipexole (MIRAPEX) 0.125 MG tablet, TAKE 1 TABLET BY MOUTH EVERY NIGHT FOR RESTLESS LEGS, Disp: 180 tablet, Rfl: 0    promethazine (PHENERGAN) 12.5 MG tablet, Take 1 tablet by mouth Every 6 (Six) Hours., Disp: , Rfl:     rosuvastatin (CRESTOR) 20 MG tablet, Take 1 tablet by mouth Every Night., Disp: 90 tablet, Rfl:  1    Scopolamine 1 MG/3DAYS patch, Apply 1 patch to skin every 3 days as needed, Disp: , Rfl:     Semaglutide (OZEMPIC, 2 MG/DOSE, SC), Inject  under the skin into the appropriate area as directed., Disp: , Rfl:     ursodiol (ACTIGALL) 500 MG tablet, TAKE 1 TABLET BY MOUTH TWICE DAILY, Disp: 180 tablet, Rfl: 1    VITAMIN E PO, Take  by mouth., Disp: , Rfl:     methylPREDNISolone (MEDROL) 4 MG dose pack, Take as directed on package instructions., Disp: 21 tablet, Rfl: 0    Allergies   Allergen Reactions    Sulfa Antibiotics Other (See Comments)     Rash, swelling, tingling, peeling.     Family History   Problem Relation Age of Onset    Sleep apnea Father     COPD Father     Other Brother         Desmoid tumor of the abdomen    Hypertension Other     Rheum arthritis Other      Cancer-related family history is not on file.    Social History     Tobacco Use    Smoking status: Never    Smokeless tobacco: Never   Vaping Use    Vaping Use: Never used   Substance Use Topics    Alcohol use: No    Drug use: No     Social History     Social History Narrative    Not on file      ROS:     Review of Systems   Constitutional:  Positive for fatigue. Negative for activity change, appetite change, chills, diaphoresis, fever and unexpected weight change.   HENT:  Negative for congestion, dental problem, drooling, ear discharge, ear pain, facial swelling, hearing loss, mouth sores, nosebleeds, postnasal drip, rhinorrhea, sinus pressure, sinus pain, sneezing, sore throat, tinnitus, trouble swallowing and voice change.    Eyes:  Negative for photophobia, pain, discharge, redness, itching and visual disturbance.   Respiratory:  Negative for apnea, cough, choking, chest tightness, shortness of breath, wheezing and stridor.    Cardiovascular:  Negative for chest pain, palpitations and leg swelling.   Gastrointestinal:  Positive for nausea and vomiting. Negative for abdominal distention, abdominal pain, anal bleeding, blood in stool,  "constipation, diarrhea and rectal pain.   Endocrine: Negative for cold intolerance, heat intolerance, polydipsia and polyuria.   Genitourinary:  Negative for decreased urine volume, difficulty urinating, dysuria, flank pain, frequency, genital sores, hematuria and urgency.   Musculoskeletal:  Negative for arthralgias, back pain, gait problem, joint swelling, myalgias, neck pain and neck stiffness.   Skin:  Negative for color change, pallor and rash.   Neurological:  Positive for weakness. Negative for dizziness, tremors, seizures, syncope, facial asymmetry, speech difficulty, light-headedness, numbness and headaches.   Hematological:  Negative for adenopathy. Does not bruise/bleed easily.   Psychiatric/Behavioral:  Negative for agitation, behavioral problems, confusion, decreased concentration, hallucinations, self-injury, sleep disturbance and suicidal ideas. The patient is not nervous/anxious.    Objective:    Vitals:    09/29/23 0916   BP: 129/94   Pulse: 99   Temp: 97.7 °F (36.5 °C)   TempSrc: Oral   SpO2: 97%   Weight: 92.6 kg (204 lb 3.2 oz)   Height: 162.6 cm (64\")   PainSc: 0-No pain     Body mass index is 35.05 kg/m².  ECOG  (0) Fully active, able to carry on all predisease performance without restriction    Physical Exam:     Physical Exam  Constitutional:       General: He is not in acute distress.     Appearance: He is ill-appearing. He is not toxic-appearing or diaphoretic.      Comments: Well-built, oriented and in no distress but seems ill.   HENT:      Head: Normocephalic and atraumatic.      Right Ear: External ear normal.      Left Ear: External ear normal.      Nose: Nose normal.      Mouth/Throat:      Mouth: Mucous membranes are moist.      Pharynx: Oropharynx is clear.      Comments: Edentulous  Eyes:      General: No scleral icterus.        Right eye: No discharge.         Left eye: No discharge.      Conjunctiva/sclera: Conjunctivae normal.      Pupils: Pupils are equal, round, and reactive " to light.   Cardiovascular:      Rate and Rhythm: Normal rate and regular rhythm.      Pulses: Normal pulses.      Heart sounds: Normal heart sounds. No murmur heard.    No friction rub. No gallop.   Pulmonary:      Effort: No respiratory distress.      Breath sounds: No stridor. No wheezing, rhonchi or rales.   Chest:      Chest wall: No tenderness.   Abdominal:      General: Bowel sounds are normal. There is no distension.      Palpations: Abdomen is soft. There is no mass.      Tenderness: There is no abdominal tenderness. There is no right CVA tenderness, left CVA tenderness, guarding or rebound.      Comments: Protuberant, soft and not tender. The spleen is not palpable because of the body habitus.    Musculoskeletal:         General: No swelling, tenderness, deformity or signs of injury.      Cervical back: No rigidity.      Right lower leg: No edema.      Left lower leg: No edema.   Lymphadenopathy:      Cervical: No cervical adenopathy.   Skin:     General: Skin is warm and dry.      Coloration: Skin is not jaundiced.      Findings: No bruising or rash.   Neurological:      General: No focal deficit present.      Mental Status: He is alert and oriented to person, place, and time.      Gait: Gait normal.   Psychiatric:         Mood and Affect: Mood normal.         Behavior: Behavior normal.         Thought Content: Thought content normal.         Judgment: Judgment normal.   DEMAR Greenwood MD performed the physical exam on 9/29/2023 as documented above.    Lab Results - Last 18 Months   Lab Units 09/29/23  0924 07/31/23  0818 06/09/23  1019   WBC 10*3/mm3 2.64* 2.45* 3.06*   HEMOGLOBIN g/dL 16.7 15.4 13.8   HEMATOCRIT % 48.5 45.1 40.2   PLATELETS 10*3/mm3 131* 90* 108*   MCV fL 83.9 86.9 89.7     Lab Results - Last 18 Months   Lab Units 07/31/23  0818 05/19/23  0903 05/19/23  0746   SODIUM mmol/L 140 139 138   POTASSIUM mmol/L 3.4* 3.9 3.7   CHLORIDE mmol/L 100 103 103   CO2 mmol/L 25.6 25.0 25.0   BUN  mg/dL 5* 7 6   CREATININE mg/dL 0.73* 0.82 0.80   CALCIUM mg/dL 8.8 9.1 8.5*   BILIRUBIN mg/dL 1.1 0.9 0.9   ALK PHOS U/L 140* 101 99   ALT (SGPT) U/L 8 13 9   AST (SGOT) U/L 16 21 20   GLUCOSE mg/dL 118* 93 110*     Lab Results   Component Value Date    GLUCOSE 118 (H) 07/31/2023    BUN 5 (L) 07/31/2023    CREATININE 0.73 (L) 07/31/2023    EGFRIFNONA 107 09/16/2021    BCR 6.8 (L) 07/31/2023    K 3.4 (L) 07/31/2023    CO2 25.6 07/31/2023    CALCIUM 8.8 07/31/2023    ALBUMIN 3.4 (L) 07/31/2023    LABIL2 1.2 11/16/2018    AST 16 07/31/2023    ALT 8 07/31/2023     Uric Acid   Date Value Ref Range Status   08/04/2022 4.1 3.4 - 7.0 mg/dL Final     Assessment & Plan     Assessment:  Leukopenia and neutropenia: Secondary to cirrhosis and splenomegaly.  At this time no need for intervention.  We will continue to follow.  Thrombocytopenia: Improved.  Steatohepatitis and cirrhosis: Discussed with him the symptoms he is having.  It appears all of his symptoms began with an increase in the dose of the study medications that include semaglutide.  He is to discuss with his gastroenterologist.  He will see me again in approximately 4 months.    Plan:  1.  As above.    Sravan Greenwood MD on 9/29/2023 at 9:50 AM.

## 2023-09-29 ENCOUNTER — APPOINTMENT (OUTPATIENT)
Dept: LAB | Facility: HOSPITAL | Age: 43
End: 2023-09-29
Payer: COMMERCIAL

## 2023-09-29 ENCOUNTER — TELEPHONE (OUTPATIENT)
Dept: ONCOLOGY | Facility: CLINIC | Age: 43
End: 2023-09-29

## 2023-09-29 ENCOUNTER — OFFICE VISIT (OUTPATIENT)
Dept: ONCOLOGY | Facility: CLINIC | Age: 43
End: 2023-09-29
Payer: COMMERCIAL

## 2023-09-29 VITALS
HEIGHT: 64 IN | DIASTOLIC BLOOD PRESSURE: 94 MMHG | SYSTOLIC BLOOD PRESSURE: 129 MMHG | HEART RATE: 99 BPM | TEMPERATURE: 97.7 F | OXYGEN SATURATION: 97 % | WEIGHT: 204.2 LBS | BODY MASS INDEX: 34.86 KG/M2

## 2023-09-29 DIAGNOSIS — D69.6 THROMBOCYTOPENIA: Primary | ICD-10-CM

## 2023-09-29 LAB
ALBUMIN SERPL-MCNC: 3.7 G/DL (ref 3.5–5.2)
ALBUMIN/GLOB SERPL: 1 G/DL
ALP SERPL-CCNC: 121 U/L (ref 39–117)
ALT SERPL W P-5'-P-CCNC: <5 U/L (ref 1–41)
ANION GAP SERPL CALCULATED.3IONS-SCNC: 16 MMOL/L (ref 5–15)
AST SERPL-CCNC: 20 U/L (ref 1–40)
BASOPHILS # BLD AUTO: 0 10*3/MM3 (ref 0–0.2)
BASOPHILS NFR BLD AUTO: 0 % (ref 0–1.5)
BILIRUB SERPL-MCNC: 1.6 MG/DL (ref 0–1.2)
BUN SERPL-MCNC: 7 MG/DL (ref 6–20)
BUN/CREAT SERPL: 7.7 (ref 7–25)
CALCIUM SPEC-SCNC: 9.4 MG/DL (ref 8.6–10.5)
CHLORIDE SERPL-SCNC: 96 MMOL/L (ref 98–107)
CO2 SERPL-SCNC: 23 MMOL/L (ref 22–29)
CREAT SERPL-MCNC: 0.91 MG/DL (ref 0.76–1.27)
DEPRECATED RDW RBC AUTO: 46.3 FL (ref 37–54)
EGFRCR SERPLBLD CKD-EPI 2021: 105.9 ML/MIN/1.73
EOSINOPHIL # BLD AUTO: 0 10*3/MM3 (ref 0–0.4)
EOSINOPHIL NFR BLD AUTO: 0 % (ref 0.3–6.2)
ERYTHROCYTE [DISTWIDTH] IN BLOOD BY AUTOMATED COUNT: 15.7 % (ref 12.3–15.4)
GLOBULIN UR ELPH-MCNC: 3.6 GM/DL
GLUCOSE SERPL-MCNC: 98 MG/DL (ref 65–99)
HCT VFR BLD AUTO: 48.5 % (ref 37.5–51)
HGB BLD-MCNC: 16.7 G/DL (ref 13–17.7)
LYMPHOCYTES # BLD AUTO: 0.68 10*3/MM3 (ref 0.7–3.1)
LYMPHOCYTES NFR BLD AUTO: 25.8 % (ref 19.6–45.3)
MCH RBC QN AUTO: 28.9 PG (ref 26.6–33)
MCHC RBC AUTO-ENTMCNC: 34.4 G/DL (ref 31.5–35.7)
MCV RBC AUTO: 83.9 FL (ref 79–97)
MONOCYTES # BLD AUTO: 0.25 10*3/MM3 (ref 0.1–0.9)
MONOCYTES NFR BLD AUTO: 9.5 % (ref 5–12)
NEUTROPHILS NFR BLD AUTO: 1.71 10*3/MM3 (ref 1.7–7)
NEUTROPHILS NFR BLD AUTO: 64.7 % (ref 42.7–76)
PLATELET # BLD AUTO: 131 10*3/MM3 (ref 140–450)
PMV BLD AUTO: 10.1 FL (ref 6–12)
POTASSIUM SERPL-SCNC: 3.8 MMOL/L (ref 3.5–5.2)
PROT SERPL-MCNC: 7.3 G/DL (ref 6–8.5)
RBC # BLD AUTO: 5.78 10*6/MM3 (ref 4.14–5.8)
SODIUM SERPL-SCNC: 135 MMOL/L (ref 136–145)
WBC NRBC COR # BLD: 2.64 10*3/MM3 (ref 3.4–10.8)

## 2023-09-29 PROCEDURE — 36415 COLL VENOUS BLD VENIPUNCTURE: CPT | Performed by: INTERNAL MEDICINE

## 2023-09-29 PROCEDURE — 80053 COMPREHEN METABOLIC PANEL: CPT | Performed by: INTERNAL MEDICINE

## 2023-09-29 PROCEDURE — 85025 COMPLETE CBC W/AUTO DIFF WBC: CPT | Performed by: INTERNAL MEDICINE

## 2023-09-29 RX ORDER — METOCLOPRAMIDE 5 MG/1
TABLET ORAL
COMMUNITY
Start: 2023-07-25

## 2023-09-29 RX ORDER — SCOLOPAMINE TRANSDERMAL SYSTEM 1 MG/1
PATCH, EXTENDED RELEASE TRANSDERMAL
COMMUNITY
Start: 2023-09-22

## 2023-09-29 RX ORDER — ONDANSETRON 4 MG/1
TABLET, ORALLY DISINTEGRATING ORAL
COMMUNITY
Start: 2023-09-07

## 2023-09-29 RX ORDER — PROMETHAZINE HYDROCHLORIDE 12.5 MG/1
1 TABLET ORAL EVERY 6 HOURS
COMMUNITY
Start: 2023-09-06

## 2023-09-29 NOTE — TELEPHONE ENCOUNTER
Caller: LEONARD GILMORE    Relationship: Emergency Contact    Best call back number: [phone]      What was the call regarding: [facility] CALLED THEY ARE RUNNING ABOUT 20 MIN LATE FOR APPOINTMENT, TRIED TO CALL THE OFFICE BUT NO ANSWER.

## 2023-10-19 ENCOUNTER — OFFICE (AMBULATORY)
Dept: URBAN - METROPOLITAN AREA CLINIC 64 | Facility: CLINIC | Age: 43
End: 2023-10-19

## 2023-10-19 VITALS
DIASTOLIC BLOOD PRESSURE: 90 MMHG | SYSTOLIC BLOOD PRESSURE: 119 MMHG | WEIGHT: 192 LBS | HEART RATE: 103 BPM | HEIGHT: 64 IN

## 2023-10-19 DIAGNOSIS — R11.2 NAUSEA WITH VOMITING, UNSPECIFIED: ICD-10-CM

## 2023-10-19 DIAGNOSIS — R53.1 WEAKNESS: ICD-10-CM

## 2023-10-19 DIAGNOSIS — K74.69 OTHER CIRRHOSIS OF LIVER: ICD-10-CM

## 2023-10-19 PROCEDURE — 99214 OFFICE O/P EST MOD 30 MIN: CPT

## 2023-10-30 ENCOUNTER — TELEMEDICINE (OUTPATIENT)
Dept: FAMILY MEDICINE CLINIC | Facility: CLINIC | Age: 43
End: 2023-10-30
Payer: COMMERCIAL

## 2023-10-30 DIAGNOSIS — R20.0 NUMBNESS AND TINGLING IN BOTH HANDS: ICD-10-CM

## 2023-10-30 DIAGNOSIS — Z90.49 S/P APPENDECTOMY: Primary | ICD-10-CM

## 2023-10-30 DIAGNOSIS — R20.2 NUMBNESS AND TINGLING IN BOTH HANDS: ICD-10-CM

## 2023-10-30 DIAGNOSIS — M62.81 GENERALIZED MUSCLE WEAKNESS: ICD-10-CM

## 2023-10-30 DIAGNOSIS — I10 HYPERTENSION, UNSPECIFIED TYPE: ICD-10-CM

## 2023-10-30 DIAGNOSIS — K75.81 NASH (NONALCOHOLIC STEATOHEPATITIS): ICD-10-CM

## 2023-10-30 DIAGNOSIS — R14.0 ABDOMINAL BLOATING: ICD-10-CM

## 2023-10-30 RX ORDER — DICYCLOMINE HYDROCHLORIDE 10 MG/1
10 CAPSULE ORAL
Qty: 120 CAPSULE | Refills: 0 | Status: SHIPPED | OUTPATIENT
Start: 2023-10-30

## 2023-10-30 RX ORDER — HYDROCODONE BITARTRATE AND ACETAMINOPHEN 5; 325 MG/1; MG/1
1 TABLET ORAL EVERY 6 HOURS PRN
Qty: 30 TABLET | Refills: 0 | Status: SHIPPED | OUTPATIENT
Start: 2023-10-30

## 2023-10-30 NOTE — PROGRESS NOTES
Chief Complaint   Patient presents with    Post-op Follow-up    Weight Loss       You have chosen to receive care through a telemedicine visit. Do you consent to use a telemedicine visit for your medical care today? Yes.  The patient was located in his home, the provider located at [facility] primary care office in [place], [place]      HPI    DEGROOT, with splenomegaly and leukopenia, he is following with hematology and gastro. A liver lesion and abnormal appearance of appendix were seen on CT. He is in a research study per Dr. Raines, he was started on Ozempic and another study drug to help with hepatic steatosis, once the doses were titrated he developed severe nausea, vomiting and was unable to keep food down. He lost about 40 pounds since our last visit in February.     He was admitted 10/5/2023 to [facility], underwent appendectomy on 10/10/23, discharged home on 10/15/23. Pathology was benign. He reports abdominal bloating, pain from shoulders to hands the hands are numb and tingle, sit to stand and transfer is about all he can do, he is very weak, using w/c for mobility within the home.  PT started 1 week ago and is gradually getting stronger. He was discharged with [place] tube which was removed after 1 week, he is eating better, appetite has returned to normal and denies nausea, however, has been trying gasx for bloating, he reports symptoms are worse after eating, ursodiol d/c'd, taking a stool softener and norco daily for PT. He is no longer taking reglan, currently not taking any regular medications or study drugs. Drinking 3 bottles water daily and a gatorade. Reports urine is dark, hard to get urine to flow, awaiting urine culture results from gastro. Since February the pt has now lost over 80 lbs. He is requesting a refill of norco. Next f/u with gastro is in 2 weeks, needs to sched f/u with Dr. Isiah Dior-gen surgeon.      BP has been running 110-120/70-80 w/o lisinopril.       Past  Medical History:   Diagnosis Date    B12 deficiency     Chronic fatigue     Dizziness     Elevated LFTs     Fatty liver     GERD (gastroesophageal reflux disease)     Gout     Headache     HSV infection     Hyperglycemia     Hyperlipidemia     Hypertension     Obesity     Obstructive sleep apnea     Onychomycosis     Pain in joint, multiple sites     Sebaceous cyst     Skin nodule     Snoring     Vision changes      No past surgical history on file.  Family History   Problem Relation Age of Onset    Sleep apnea Father     COPD Father     Other Brother         Desmoid tumor of the abdomen    Hypertension Other     Rheum arthritis Other      Social History     Tobacco Use    Smoking status: Never    Smokeless tobacco: Never   Substance Use Topics    Alcohol use: No         Current Outpatient Medications:     omeprazole (priLOSEC) 40 MG capsule, Take 1 capsule by mouth Daily., Disp: 90 capsule, Rfl: 1    Cyanocobalamin (VITAMIN B-12 PO), Take  by mouth., Disp: , Rfl:     dicyclomine (BENTYL) 10 MG capsule, Take 1 capsule by mouth 4 (Four) Times a Day Before Meals & at Bedtime., Disp: 120 capsule, Rfl: 0    fenofibrate (TRICOR) 145 MG tablet, TAKE 1 TABLET BY MOUTH DAILY, Disp: 90 tablet, Rfl: 1    HYDROcodone-acetaminophen (Norco) 5-325 MG per tablet, Take 1 tablet by mouth Every 6 (Six) Hours As Needed for Severe Pain., Disp: 30 tablet, Rfl: 0    lisinopril (PRINIVIL,ZESTRIL) 10 MG tablet, TAKE 1 TABLET BY MOUTH DAILY, Disp: 90 tablet, Rfl: 1    metoclopramide (REGLAN) 5 MG tablet, TAKE 1 TABLET BY MOUTH 2 TIMES A DAY FOR 30 DAYS, Disp: , Rfl:     ondansetron ODT (ZOFRAN-ODT) 4 MG disintegrating tablet, DISSOLVE 1 TABLET IN MOUTH UNDER THE TONGUE 3 TIMES A DAY AS NEEDED FOR 10 DAYS, Disp: , Rfl:     pramipexole (MIRAPEX) 0.125 MG tablet, TAKE 1 TABLET BY MOUTH EVERY NIGHT FOR RESTLESS LEGS, Disp: 180 tablet, Rfl: 0    promethazine (PHENERGAN) 12.5 MG tablet, Take 1 tablet by mouth Every 6 (Six) Hours., Disp: , Rfl:      rosuvastatin (CRESTOR) 20 MG tablet, Take 1 tablet by mouth Every Night., Disp: 90 tablet, Rfl: 1    Semaglutide (OZEMPIC, 2 MG/DOSE, SC), Inject  under the skin into the appropriate area as directed., Disp: , Rfl:     ursodiol (ACTIGALL) 500 MG tablet, TAKE 1 TABLET BY MOUTH TWICE DAILY, Disp: 180 tablet, Rfl: 1    VITAMIN E PO, Take  by mouth., Disp: , Rfl:       The following portions of the patient's history were reviewed and updated as appropriate: allergies, current medications, past family history, past medical history, past social history, past surgical history and problem list.        There were no vitals filed for this visit.  There is no height or weight on file to calculate BMI.    Physical Exam  Constitutional:       General: He is not in acute distress.     Comments: Exam otherwise deferred through video/audio visit   Pulmonary:      Effort: Pulmonary effort is normal. No respiratory distress.   Neurological:      Mental Status: He is alert and oriented to person, place, and time.      Motor: No weakness.      Gait: Gait normal.   Psychiatric:         Behavior: Behavior normal.         Thought Content: Thought content normal.         Judgment: Judgment normal.         No visits with results within 7 Day(s) from this visit.   Latest known visit with results is:   Office Visit on 09/29/2023   Component Date Value Ref Range Status    Glucose 09/29/2023 98  65 - 99 mg/dL Final    BUN 09/29/2023 7  6 - 20 mg/dL Final    Creatinine 09/29/2023 0.91  0.76 - 1.27 mg/dL Final    Sodium 09/29/2023 135 (L)  136 - 145 mmol/L Final    Potassium 09/29/2023 3.8  3.5 - 5.2 mmol/L Final    Chloride 09/29/2023 96 (L)  98 - 107 mmol/L Final    CO2 09/29/2023 23.0  22.0 - 29.0 mmol/L Final    Calcium 09/29/2023 9.4  8.6 - 10.5 mg/dL Final    Total Protein 09/29/2023 7.3  6.0 - 8.5 g/dL Final    Albumin 09/29/2023 3.7  3.5 - 5.2 g/dL Final    ALT (SGPT) 09/29/2023 <5  1 - 41 U/L Final    AST (SGOT) 09/29/2023 20  1 - 40  U/L Final    Alkaline Phosphatase 09/29/2023 121 (H)  39 - 117 U/L Final    Total Bilirubin 09/29/2023 1.6 (H)  0.0 - 1.2 mg/dL Final    Globulin 09/29/2023 3.6  gm/dL Final    A/G Ratio 09/29/2023 1.0  g/dL Final    BUN/Creatinine Ratio 09/29/2023 7.7  7.0 - 25.0 Final    Anion Gap 09/29/2023 16.0 (H)  5.0 - 15.0 mmol/L Final    eGFR 09/29/2023 105.9  >60.0 mL/min/1.73 Final    WBC 09/29/2023 2.64 (L)  3.40 - 10.80 10*3/mm3 Final    RBC 09/29/2023 5.78  4.14 - 5.80 10*6/mm3 Final    Hemoglobin 09/29/2023 16.7  13.0 - 17.7 g/dL Final    Hematocrit 09/29/2023 48.5  37.5 - 51.0 % Final    MCV 09/29/2023 83.9  79.0 - 97.0 fL Final    MCH 09/29/2023 28.9  26.6 - 33.0 pg Final    MCHC 09/29/2023 34.4  31.5 - 35.7 g/dL Final    RDW 09/29/2023 15.7 (H)  12.3 - 15.4 % Final    RDW-SD 09/29/2023 46.3  37.0 - 54.0 fl Final    MPV 09/29/2023 10.1  6.0 - 12.0 fL Final    Platelets 09/29/2023 131 (L)  140 - 450 10*3/mm3 Final    Neutrophil % 09/29/2023 64.7  42.7 - 76.0 % Final    Lymphocyte % 09/29/2023 25.8  19.6 - 45.3 % Final    Monocyte % 09/29/2023 9.5  5.0 - 12.0 % Final    Eosinophil % 09/29/2023 0.0 (L)  0.3 - 6.2 % Final    Basophil % 09/29/2023 0.0  0.0 - 1.5 % Final    Neutrophils, Absolute 09/29/2023 1.71  1.70 - 7.00 10*3/mm3 Final    Lymphocytes, Absolute 09/29/2023 0.68 (L)  0.70 - 3.10 10*3/mm3 Final    Monocytes, Absolute 09/29/2023 0.25  0.10 - 0.90 10*3/mm3 Final    Eosinophils, Absolute 09/29/2023 0.00  0.00 - 0.40 10*3/mm3 Final    Basophils, Absolute 09/29/2023 0.00  0.00 - 0.20 10*3/mm3 Final       Diagnoses and all orders for this visit:    1. S/P appendectomy (Primary)  -     HYDROcodone-acetaminophen (Norco) 5-325 MG per tablet; Take 1 tablet by mouth Every 6 (Six) Hours As Needed for Severe Pain.  Dispense: 30 tablet; Refill: 0    2. DEGROOT (nonalcoholic steatohepatitis)    3. Numbness and tingling in both hands  -     HYDROcodone-acetaminophen (Norco) 5-325 MG per tablet; Take 1 tablet by mouth Every 6  (Six) Hours As Needed for Severe Pain.  Dispense: 30 tablet; Refill: 0    4. Generalized muscle weakness    5. Abdominal bloating  -     dicyclomine (BENTYL) 10 MG capsule; Take 1 capsule by mouth 4 (Four) Times a Day Before Meals & at Bedtime.  Dispense: 120 capsule; Refill: 0    6. Hypertension, unspecified type      Appendix pathology benign  Continue with gastro  Follow-up with general surgeon as directed  Cont PT, hand numbness/tingling d/t using walker, will likely improve over time as patient gets stronger and returns to baseline   Try bentyl for bloating/cramping  Rec small and more frequent meals  Ok to remain of lisinopril, bp stable  Okay to remain off statin for now, resume when returns to baseline in 4-6 weeks.   Ok to continue/refill norco prn    Return in about 6 weeks (around 12/11/2023) for Recheck.      This was an audio and video enabled telemedicine encounter.  Total time of discussion was 28 minutes       EMR Dragon transcription disclaimer:  Part of this note may be an electronic transcription/translation of spoken language to printed text using the Dragon Dictation System.

## 2023-11-13 ENCOUNTER — OFFICE (AMBULATORY)
Dept: URBAN - METROPOLITAN AREA CLINIC 64 | Facility: CLINIC | Age: 43
End: 2023-11-13

## 2023-11-13 VITALS
HEART RATE: 96 BPM | HEIGHT: 64 IN | DIASTOLIC BLOOD PRESSURE: 91 MMHG | WEIGHT: 208 LBS | SYSTOLIC BLOOD PRESSURE: 138 MMHG

## 2023-11-13 DIAGNOSIS — R20.0 NUMBNESS AND TINGLING IN BOTH HANDS: ICD-10-CM

## 2023-11-13 DIAGNOSIS — K59.00 CONSTIPATION, UNSPECIFIED: ICD-10-CM

## 2023-11-13 DIAGNOSIS — R20.2 NUMBNESS AND TINGLING IN BOTH HANDS: ICD-10-CM

## 2023-11-13 DIAGNOSIS — Z90.49 S/P APPENDECTOMY: ICD-10-CM

## 2023-11-13 DIAGNOSIS — R14.0 ABDOMINAL DISTENSION (GASEOUS): ICD-10-CM

## 2023-11-13 PROCEDURE — 99214 OFFICE O/P EST MOD 30 MIN: CPT

## 2023-11-14 RX ORDER — HYDROCODONE BITARTRATE AND ACETAMINOPHEN 5; 325 MG/1; MG/1
1 TABLET ORAL EVERY 6 HOURS PRN
Qty: 30 TABLET | Refills: 0 | Status: SHIPPED | OUTPATIENT
Start: 2023-11-14

## 2023-11-30 DIAGNOSIS — R20.2 NUMBNESS AND TINGLING IN BOTH HANDS: ICD-10-CM

## 2023-11-30 DIAGNOSIS — R20.0 NUMBNESS AND TINGLING IN BOTH HANDS: ICD-10-CM

## 2023-11-30 DIAGNOSIS — Z90.49 S/P APPENDECTOMY: ICD-10-CM

## 2023-12-01 RX ORDER — HYDROCODONE BITARTRATE AND ACETAMINOPHEN 5; 325 MG/1; MG/1
1 TABLET ORAL EVERY 6 HOURS PRN
Qty: 30 TABLET | Refills: 0 | Status: SHIPPED | OUTPATIENT
Start: 2023-12-01

## 2023-12-06 RX ORDER — SPIRONOLACTONE 25 MG/1
25 TABLET ORAL DAILY PRN
Qty: 30 TABLET | Refills: 0 | Status: SHIPPED | OUTPATIENT
Start: 2023-12-06

## 2023-12-16 DIAGNOSIS — R20.2 NUMBNESS AND TINGLING IN BOTH HANDS: ICD-10-CM

## 2023-12-16 DIAGNOSIS — Z90.49 S/P APPENDECTOMY: ICD-10-CM

## 2023-12-16 DIAGNOSIS — R20.0 NUMBNESS AND TINGLING IN BOTH HANDS: ICD-10-CM

## 2023-12-18 RX ORDER — HYDROCODONE BITARTRATE AND ACETAMINOPHEN 5; 325 MG/1; MG/1
1 TABLET ORAL EVERY 6 HOURS PRN
Qty: 30 TABLET | Refills: 0 | Status: SHIPPED | OUTPATIENT
Start: 2023-12-18

## 2024-01-02 ENCOUNTER — ON CAMPUS - OUTPATIENT (AMBULATORY)
Dept: URBAN - METROPOLITAN AREA HOSPITAL 77 | Facility: HOSPITAL | Age: 44
End: 2024-01-02

## 2024-01-02 DIAGNOSIS — K22.2 ESOPHAGEAL OBSTRUCTION: ICD-10-CM

## 2024-01-02 DIAGNOSIS — K80.50 CALCULUS OF BILE DUCT WITHOUT CHOLANGITIS OR CHOLECYSTITIS W: ICD-10-CM

## 2024-01-02 PROCEDURE — 43450 DILATE ESOPHAGUS 1/MULT PASS: CPT | Performed by: INTERNAL MEDICINE

## 2024-01-02 PROCEDURE — 43262 ENDO CHOLANGIOPANCREATOGRAPH: CPT | Performed by: INTERNAL MEDICINE

## 2024-01-03 DIAGNOSIS — R20.0 NUMBNESS AND TINGLING IN BOTH HANDS: ICD-10-CM

## 2024-01-03 DIAGNOSIS — R20.2 NUMBNESS AND TINGLING IN BOTH HANDS: ICD-10-CM

## 2024-01-03 DIAGNOSIS — Z90.49 S/P APPENDECTOMY: ICD-10-CM

## 2024-01-03 RX ORDER — HYDROCODONE BITARTRATE AND ACETAMINOPHEN 5; 325 MG/1; MG/1
1 TABLET ORAL EVERY 6 HOURS PRN
Qty: 30 TABLET | Refills: 0 | Status: SHIPPED | OUTPATIENT
Start: 2024-01-03

## 2024-01-11 ENCOUNTER — TELEPHONE (OUTPATIENT)
Dept: ONCOLOGY | Facility: CLINIC | Age: 44
End: 2024-01-11
Payer: COMMERCIAL

## 2024-01-11 NOTE — TELEPHONE ENCOUNTER
Spoke with both Mr. And Mrs. Khan to reschedule Mr. Khan's follow up appt for 1/31/24 due to Dr. Greenwood being out of office. New appt confirmed by both Mr. Khan and his spouse.

## 2024-01-15 ENCOUNTER — OFFICE VISIT (OUTPATIENT)
Dept: FAMILY MEDICINE CLINIC | Facility: CLINIC | Age: 44
End: 2024-01-15
Payer: COMMERCIAL

## 2024-01-15 VITALS
DIASTOLIC BLOOD PRESSURE: 80 MMHG | RESPIRATION RATE: 18 BRPM | BODY MASS INDEX: 35.87 KG/M2 | SYSTOLIC BLOOD PRESSURE: 133 MMHG | OXYGEN SATURATION: 99 % | TEMPERATURE: 98.2 F | HEIGHT: 64 IN | HEART RATE: 77 BPM | WEIGHT: 210.1 LBS

## 2024-01-15 DIAGNOSIS — E78.2 MIXED HYPERLIPIDEMIA: ICD-10-CM

## 2024-01-15 DIAGNOSIS — D69.6 THROMBOCYTOPENIA: ICD-10-CM

## 2024-01-15 DIAGNOSIS — I10 HYPERTENSION, UNSPECIFIED TYPE: ICD-10-CM

## 2024-01-15 DIAGNOSIS — K75.81 NASH (NONALCOHOLIC STEATOHEPATITIS): ICD-10-CM

## 2024-01-15 DIAGNOSIS — K21.9 GASTROESOPHAGEAL REFLUX DISEASE WITHOUT ESOPHAGITIS: ICD-10-CM

## 2024-01-15 DIAGNOSIS — K80.10 CALCULUS OF GALLBLADDER WITH CHRONIC CHOLECYSTITIS WITHOUT OBSTRUCTION: ICD-10-CM

## 2024-01-15 DIAGNOSIS — R20.0 NUMBNESS AND TINGLING IN BOTH HANDS: Primary | ICD-10-CM

## 2024-01-15 DIAGNOSIS — R26.0 ATAXIC GAIT: ICD-10-CM

## 2024-01-15 DIAGNOSIS — R20.2 NUMBNESS AND TINGLING IN BOTH HANDS: Primary | ICD-10-CM

## 2024-01-15 DIAGNOSIS — Z90.49 S/P APPENDECTOMY: ICD-10-CM

## 2024-01-15 DIAGNOSIS — M62.81 GENERALIZED MUSCLE WEAKNESS: ICD-10-CM

## 2024-01-15 DIAGNOSIS — M79.10 MYALGIA: ICD-10-CM

## 2024-01-15 DIAGNOSIS — R60.0 BILATERAL EDEMA OF LOWER EXTREMITY: ICD-10-CM

## 2024-01-15 DIAGNOSIS — L29.9 PRURITUS: ICD-10-CM

## 2024-01-15 LAB
ALBUMIN SERPL-MCNC: 2.6 G/DL (ref 3.5–5.2)
ALBUMIN/GLOB SERPL: 0.6 G/DL
ALP SERPL-CCNC: 234 U/L (ref 39–117)
ALT SERPL W P-5'-P-CCNC: 5 U/L (ref 1–41)
ANION GAP SERPL CALCULATED.3IONS-SCNC: 11.2 MMOL/L (ref 5–15)
AST SERPL-CCNC: 16 U/L (ref 1–40)
BASOPHILS # BLD AUTO: 0 10*3/MM3 (ref 0–0.2)
BASOPHILS NFR BLD AUTO: 0 % (ref 0–1.5)
BILIRUB SERPL-MCNC: 1.1 MG/DL (ref 0–1.2)
BUN SERPL-MCNC: 6 MG/DL (ref 6–20)
BUN/CREAT SERPL: 10 (ref 7–25)
CALCIUM SPEC-SCNC: 8.2 MG/DL (ref 8.6–10.5)
CHLORIDE SERPL-SCNC: 99 MMOL/L (ref 98–107)
CHOLEST SERPL-MCNC: 130 MG/DL (ref 0–200)
CO2 SERPL-SCNC: 25.8 MMOL/L (ref 22–29)
CREAT SERPL-MCNC: 0.6 MG/DL (ref 0.76–1.27)
DEPRECATED RDW RBC AUTO: 43.6 FL (ref 37–54)
EGFRCR SERPLBLD CKD-EPI 2021: 121.3 ML/MIN/1.73
EOSINOPHIL # BLD AUTO: 0 10*3/MM3 (ref 0–0.4)
EOSINOPHIL NFR BLD AUTO: 0 % (ref 0.3–6.2)
ERYTHROCYTE [DISTWIDTH] IN BLOOD BY AUTOMATED COUNT: 14 % (ref 12.3–15.4)
GLOBULIN UR ELPH-MCNC: 4.3 GM/DL
GLUCOSE SERPL-MCNC: 95 MG/DL (ref 65–99)
HCT VFR BLD AUTO: 38 % (ref 37.5–51)
HDLC SERPL-MCNC: 37 MG/DL (ref 40–60)
HGB BLD-MCNC: 13 G/DL (ref 13–17.7)
IMM GRANULOCYTES # BLD AUTO: 0.01 10*3/MM3 (ref 0–0.05)
IMM GRANULOCYTES NFR BLD AUTO: 0.4 % (ref 0–0.5)
LDLC SERPL CALC-MCNC: 78 MG/DL (ref 0–100)
LDLC/HDLC SERPL: 2.1 {RATIO}
LYMPHOCYTES # BLD AUTO: 0.83 10*3/MM3 (ref 0.7–3.1)
LYMPHOCYTES NFR BLD AUTO: 30.2 % (ref 19.6–45.3)
MCH RBC QN AUTO: 29.5 PG (ref 26.6–33)
MCHC RBC AUTO-ENTMCNC: 34.2 G/DL (ref 31.5–35.7)
MCV RBC AUTO: 86.4 FL (ref 79–97)
MONOCYTES # BLD AUTO: 0.19 10*3/MM3 (ref 0.1–0.9)
MONOCYTES NFR BLD AUTO: 6.9 % (ref 5–12)
NEUTROPHILS NFR BLD AUTO: 1.72 10*3/MM3 (ref 1.7–7)
NEUTROPHILS NFR BLD AUTO: 62.5 % (ref 42.7–76)
NRBC BLD AUTO-RTO: 0 /100 WBC (ref 0–0.2)
PLATELET # BLD AUTO: 151 10*3/MM3 (ref 140–450)
PMV BLD AUTO: 10.7 FL (ref 6–12)
POTASSIUM SERPL-SCNC: 4 MMOL/L (ref 3.5–5.2)
PROT SERPL-MCNC: 6.9 G/DL (ref 6–8.5)
RBC # BLD AUTO: 4.4 10*6/MM3 (ref 4.14–5.8)
SODIUM SERPL-SCNC: 136 MMOL/L (ref 136–145)
TRIGL SERPL-MCNC: 77 MG/DL (ref 0–150)
TSH SERPL DL<=0.05 MIU/L-ACNC: 2.04 UIU/ML (ref 0.27–4.2)
VLDLC SERPL-MCNC: 15 MG/DL (ref 5–40)
WBC NRBC COR # BLD AUTO: 2.75 10*3/MM3 (ref 3.4–10.8)

## 2024-01-15 PROCEDURE — 80061 LIPID PANEL: CPT | Performed by: NURSE PRACTITIONER

## 2024-01-15 PROCEDURE — 80053 COMPREHEN METABOLIC PANEL: CPT | Performed by: NURSE PRACTITIONER

## 2024-01-15 PROCEDURE — 85025 COMPLETE CBC W/AUTO DIFF WBC: CPT | Performed by: NURSE PRACTITIONER

## 2024-01-15 PROCEDURE — 84443 ASSAY THYROID STIM HORMONE: CPT | Performed by: NURSE PRACTITIONER

## 2024-01-15 RX ORDER — GABAPENTIN 300 MG/1
300 CAPSULE ORAL 3 TIMES DAILY
COMMUNITY
End: 2024-01-15 | Stop reason: SDUPTHER

## 2024-01-15 RX ORDER — HYDROXYZINE HYDROCHLORIDE 10 MG/1
10 TABLET, FILM COATED ORAL 3 TIMES DAILY PRN
Qty: 45 TABLET | Refills: 0 | Status: SHIPPED | OUTPATIENT
Start: 2024-01-15

## 2024-01-15 RX ORDER — GABAPENTIN 400 MG/1
400 CAPSULE ORAL 3 TIMES DAILY
Qty: 90 CAPSULE | Refills: 2 | Status: SHIPPED | OUTPATIENT
Start: 2024-01-15

## 2024-01-15 RX ORDER — SPIRONOLACTONE 25 MG/1
25 TABLET ORAL DAILY PRN
Qty: 90 TABLET | Refills: 1 | Status: SHIPPED | OUTPATIENT
Start: 2024-01-15

## 2024-01-15 RX ORDER — OMEPRAZOLE 40 MG/1
40 CAPSULE, DELAYED RELEASE ORAL DAILY
Qty: 90 CAPSULE | Refills: 1 | Status: SHIPPED | OUTPATIENT
Start: 2024-01-15

## 2024-01-15 RX ORDER — HYDROCODONE BITARTRATE AND ACETAMINOPHEN 5; 325 MG/1; MG/1
1 TABLET ORAL EVERY 6 HOURS PRN
Qty: 60 TABLET | Refills: 0 | Status: SHIPPED | OUTPATIENT
Start: 2024-01-15

## 2024-01-15 NOTE — PROGRESS NOTES
Chief Complaint  Chief Complaint   Patient presents with    Follow-up     LA documentation.    Pain     Generalized Pins & needles post surgery 10/12           Subjective          Farhad Khan presents to [facility] for   History of Present Illness    DEGROOT, with splenomegaly and leukopenia, he is following with hematology and gastro. He underwent appendectomy on 10/10/2023, course was complicated.  He was discharged home with an [place] tube, is doing physical therapy.  Still extremely weak and stiff but no longer requiring a wheelchair..  His wife called the office on 12/6/2023 following a [facility] ER visit, he started having swelling in his ankles, spironolactone was ordered to use as needed.  He continues to complain of numbness and tingling in both hands, he is using hydrocodone 1 in am, 1/2 at noon and 1/2 at bedtime for generalized pain.  On 1/2/2024 he underwent ERCP, esophageal dilation and biliary sphincterotomy of the common bile duct per Dr. Pizano.  Recommended to resume full liquid diet and advance as tolerated, he is tolerating a regular diet, has good appetite. Recommended by Dr. Pizano if he continues to have gas and bloating was recommended for general surgery for cholecystectomy, he has been referred to Dr. Hernandez    He is doing PT/OT 2 days/week w/in the home per FUENTES, he can do stairs now, has been sleeping in his own bed for about 2 weeks. His weight got to as low as 190. Swelling has improved with spironolactone    Abd pain and bloating, now mostly upper abdominal, he is taking norco as mentioned, bowels are moving 4-6 times/day with miralax and colace. He has tried Gas x, activated charcoal. Appetite has improved but there are many triggering foods he now avoids. He was referred to Dr. Hernandez for cholecystectomy        The following portions of the patient's history were reviewed and updated as appropriate: allergies, current medications, past  family history, past medical history, past social history, past surgical history and problem list.    Past Medical History:   Diagnosis Date    B12 deficiency     Chronic fatigue     Dizziness     Elevated LFTs     Fatty liver     GERD (gastroesophageal reflux disease)     Gout     Headache     HSV infection     Hyperglycemia     Hyperlipidemia     Hypertension     Obesity     Obstructive sleep apnea     Onychomycosis     Pain in joint, multiple sites     Sebaceous cyst     Skin nodule     Snoring     Vision changes      History reviewed. No pertinent surgical history.  Family History   Problem Relation Age of Onset    Sleep apnea Father     COPD Father     Other Brother         Desmoid tumor of the abdomen    Hypertension Other     Rheum arthritis Other      Social History     Tobacco Use    Smoking status: Never    Smokeless tobacco: Never   Substance Use Topics    Alcohol use: No       Current Outpatient Medications:     Cyanocobalamin (VITAMIN B-12 PO), Take  by mouth., Disp: , Rfl:     Docusate Sodium (COLACE PO), Per instructions 2 TIMES DAILY (route: oral), Disp: , Rfl:     fenofibrate (TRICOR) 145 MG tablet, TAKE 1 TABLET BY MOUTH DAILY, Disp: 90 tablet, Rfl: 1    gabapentin (NEURONTIN) 400 MG capsule, Take 1 capsule by mouth 3 (Three) Times a Day., Disp: 90 capsule, Rfl: 2    HYDROcodone-acetaminophen (NORCO) 5-325 MG per tablet, Take 1 tablet by mouth Every 6 (Six) Hours As Needed for Severe Pain., Disp: 60 tablet, Rfl: 0    lisinopril (PRINIVIL,ZESTRIL) 10 MG tablet, TAKE 1 TABLET BY MOUTH DAILY (Patient taking differently: Take 1 tablet by mouth Daily. As needed .), Disp: 90 tablet, Rfl: 1    omeprazole (priLOSEC) 40 MG capsule, Take 1 capsule by mouth Daily., Disp: 90 capsule, Rfl: 1    pramipexole (MIRAPEX) 0.125 MG tablet, TAKE 1 TABLET BY MOUTH EVERY NIGHT FOR RESTLESS LEGS (Patient taking differently: Take 1 tablet by mouth Every Night. For restless legs as needed), Disp: 180 tablet, Rfl: 0     "rosuvastatin (CRESTOR) 20 MG tablet, Take 1 tablet by mouth Every Night., Disp: 90 tablet, Rfl: 1    spironolactone (Aldactone) 25 MG tablet, Take 1 tablet by mouth Daily As Needed (Swelling). As needed, Disp: 90 tablet, Rfl: 1    hydrOXYzine (ATARAX) 10 MG tablet, Take 1 tablet by mouth 3 (Three) Times a Day As Needed for Itching., Disp: 45 tablet, Rfl: 0    Objective   Vital Signs:   /80 (BP Location: Right arm, Patient Position: Sitting, Cuff Size: Adult)   Pulse 77   Temp 98.2 °F (36.8 °C) (Temporal)   Resp 18   Ht 162.6 cm (64\")   Wt 95.3 kg (210 lb 1.6 oz)   SpO2 99%   BMI 36.06 kg/m²           Physical Exam  Constitutional:       General: He is not in acute distress.     Appearance: Normal appearance. He is well-developed. He is not ill-appearing or diaphoretic.   HENT:      Head: Normocephalic.   Eyes:      Conjunctiva/sclera: Conjunctivae normal.      Pupils: Pupils are equal, round, and reactive to light.   Neck:      Thyroid: No thyromegaly.      Vascular: No JVD.   Cardiovascular:      Rate and Rhythm: Normal rate and regular rhythm.      Heart sounds: Normal heart sounds. No murmur heard.  Pulmonary:      Effort: Pulmonary effort is normal. No respiratory distress.      Breath sounds: Normal breath sounds. No wheezing or rhonchi.   Abdominal:      General: Bowel sounds are normal. There is no distension.      Palpations: Abdomen is soft.      Tenderness: There is abdominal tenderness.   Musculoskeletal:         General: Swelling (BLE trace pitting) and tenderness present. Normal range of motion.      Cervical back: Normal range of motion and neck supple. No tenderness.   Lymphadenopathy:      Cervical: No cervical adenopathy.   Skin:     General: Skin is warm and dry.      Coloration: Skin is not jaundiced.      Findings: No erythema or rash.   Neurological:      General: No focal deficit present.      Mental Status: He is alert and oriented to person, place, and time. Mental status is at " baseline.      Sensory: Sensory deficit (B hands/forearms, upper thighs numbness/tingling) present.      Motor: Weakness (generalized, improving) present.      Gait: Gait abnormal (stiffness of BLE, able to ambulate short distance).   Psychiatric:         Mood and Affect: Mood normal.         Behavior: Behavior normal.         Thought Content: Thought content normal.         Judgment: Judgment normal.          Result Review :     Office Visit on 01/15/2024   Component Date Value Ref Range Status    Total Cholesterol 01/15/2024 130  0 - 200 mg/dL Final    Triglycerides 01/15/2024 77  0 - 150 mg/dL Final    HDL Cholesterol 01/15/2024 37 (L)  40 - 60 mg/dL Final    LDL Cholesterol  01/15/2024 78  0 - 100 mg/dL Final    VLDL Cholesterol 01/15/2024 15  5 - 40 mg/dL Final    LDL/HDL Ratio 01/15/2024 2.10   Final    Glucose 01/15/2024 95  65 - 99 mg/dL Final    BUN 01/15/2024 6  6 - 20 mg/dL Final    Creatinine 01/15/2024 0.60 (L)  0.76 - 1.27 mg/dL Final    Sodium 01/15/2024 136  136 - 145 mmol/L Final    Potassium 01/15/2024 4.0  3.5 - 5.2 mmol/L Final    Chloride 01/15/2024 99  98 - 107 mmol/L Final    CO2 01/15/2024 25.8  22.0 - 29.0 mmol/L Final    Calcium 01/15/2024 8.2 (L)  8.6 - 10.5 mg/dL Final    Total Protein 01/15/2024 6.9  6.0 - 8.5 g/dL Final    Albumin 01/15/2024 2.6 (L)  3.5 - 5.2 g/dL Final    ALT (SGPT) 01/15/2024 5  1 - 41 U/L Final    AST (SGOT) 01/15/2024 16  1 - 40 U/L Final    Alkaline Phosphatase 01/15/2024 234 (H)  39 - 117 U/L Final    Total Bilirubin 01/15/2024 1.1  0.0 - 1.2 mg/dL Final    Globulin 01/15/2024 4.3  gm/dL Final    A/G Ratio 01/15/2024 0.6  g/dL Final    BUN/Creatinine Ratio 01/15/2024 10.0  7.0 - 25.0 Final    Anion Gap 01/15/2024 11.2  5.0 - 15.0 mmol/L Final    eGFR 01/15/2024 121.3  >60.0 mL/min/1.73 Final    TSH 01/15/2024 2.040  0.270 - 4.200 uIU/mL Final    WBC 01/15/2024 2.75 (L)  3.40 - 10.80 10*3/mm3 Final    RBC 01/15/2024 4.40  4.14 - 5.80 10*6/mm3 Final    Hemoglobin  01/15/2024 13.0  13.0 - 17.7 g/dL Final    Hematocrit 01/15/2024 38.0  37.5 - 51.0 % Final    MCV 01/15/2024 86.4  79.0 - 97.0 fL Final    MCH 01/15/2024 29.5  26.6 - 33.0 pg Final    MCHC 01/15/2024 34.2  31.5 - 35.7 g/dL Final    RDW 01/15/2024 14.0  12.3 - 15.4 % Final    RDW-SD 01/15/2024 43.6  37.0 - 54.0 fl Final    MPV 01/15/2024 10.7  6.0 - 12.0 fL Final    Platelets 01/15/2024 151  140 - 450 10*3/mm3 Final    Neutrophil % 01/15/2024 62.5  42.7 - 76.0 % Final    Lymphocyte % 01/15/2024 30.2  19.6 - 45.3 % Final    Monocyte % 01/15/2024 6.9  5.0 - 12.0 % Final    Eosinophil % 01/15/2024 0.0 (L)  0.3 - 6.2 % Final    Basophil % 01/15/2024 0.0  0.0 - 1.5 % Final    Immature Grans % 01/15/2024 0.4  0.0 - 0.5 % Final    Neutrophils, Absolute 01/15/2024 1.72  1.70 - 7.00 10*3/mm3 Final    Lymphocytes, Absolute 01/15/2024 0.83  0.70 - 3.10 10*3/mm3 Final    Monocytes, Absolute 01/15/2024 0.19  0.10 - 0.90 10*3/mm3 Final    Eosinophils, Absolute 01/15/2024 0.00  0.00 - 0.40 10*3/mm3 Final    Basophils, Absolute 01/15/2024 0.00  0.00 - 0.20 10*3/mm3 Final    Immature Grans, Absolute 01/15/2024 0.01  0.00 - 0.05 10*3/mm3 Final    nRBC 01/15/2024 0.0  0.0 - 0.2 /100 WBC Final                              Assessment and Plan    Diagnoses and all orders for this visit:    1. Numbness and tingling in both hands (Primary)  Comments:  increase gabapentin to 400 mg 3 times daily  Continue Norco as needed  Continue PT, protein and regaining muscle mass  Continue/refill hydrocodone  Orders:  -     HYDROcodone-acetaminophen (NORCO) 5-325 MG per tablet; Take 1 tablet by mouth Every 6 (Six) Hours As Needed for Severe Pain.  Dispense: 60 tablet; Refill: 0    2. DEGROOT (nonalcoholic steatohepatitis)  Comments:  Keep follow-ups with Dr. Pizano as directed    3. S/P appendectomy  -     HYDROcodone-acetaminophen (NORCO) 5-325 MG per tablet; Take 1 tablet by mouth Every 6 (Six) Hours As Needed for Severe Pain.  Dispense: 60 tablet;  Refill: 0    4. Pruritus  Comments:  rec trial of hydroxyzine    5. Mixed hyperlipidemia  Comments:  Check lipids today  Patient has been off of fenofibrate and Crestor for about 6 months  Orders:  -     Lipid Panel    6. Thrombocytopenia  Comments:  f/u with hematology as directed, most recent CBC returned to normal  Orders:  -     CBC & Differential    7. Calculus of gallbladder with chronic cholecystitis without obstruction  Comments:  Patient has appointment with Dr. Hernandez end of January, planning cholecystectomy    8. Gastroesophageal reflux disease without esophagitis  Comments:  stable on prilosec    9. Hypertension, unspecified type  Comments:  BP stable, currently not on lisinopril  Okay to remain off of lisinopril, notify if BP consistently > 140  Orders:  -     Lipid Panel  -     Comprehensive Metabolic Panel  -     TSH  -     CBC & Differential    10. Generalized muscle weakness    11. Ataxic gait    12. Myalgia  Comments:  2/2 PT/OT and strengthening, ok to continue Norco as needed    13. Bilateral edema of lower extremity  Comments:  Swelling improved but still pitting, resume spironolactone 1 to 2 weeks then every other day    Other orders  -     gabapentin (NEURONTIN) 400 MG capsule; Take 1 capsule by mouth 3 (Three) Times a Day.  Dispense: 90 capsule; Refill: 2  -     omeprazole (priLOSEC) 40 MG capsule; Take 1 capsule by mouth Daily.  Dispense: 90 capsule; Refill: 1  -     spironolactone (Aldactone) 25 MG tablet; Take 1 tablet by mouth Daily As Needed (Swelling). As needed  Dispense: 90 tablet; Refill: 1  -     hydrOXYzine (ATARAX) 10 MG tablet; Take 1 tablet by mouth 3 (Three) Times a Day As Needed for Itching.  Dispense: 45 tablet; Refill: 0        Pt is working from home, will allow FMLA 1-2 days/week for appointments and will be starting outpatient PT/OT  Labs today, consider d/c feno and crestor, hasn't had meds since June/July        I spent 45 minutes caring for Farhad Khan on  this date of service. This time includes time spent by me in the following activities: preparing for the visit, reviewing tests, performing a medically appropriate examination and/or evaluation , counseling and educating the patient/family/caregiver, ordering medications, tests, or procedures and documenting information in the medical record        Follow Up     Return in about 3 months (around 4/15/2024) for Recheck pain, generalized weakness.  Patient was given instructions and counseling regarding his condition or for health maintenance advice. Please see specific information pulled into the AVS if appropriate.        Part of this note may be an electronic transcription/translation of spoken language to printed text using the Dragon Dictation System

## 2024-01-15 NOTE — PROGRESS NOTES
Venipuncture Blood Specimen Collection  Venipuncture performed in [place] by Linsey Ramirez MA with good hemostasis. Patient tolerated the procedure well without complications.   01/15/24   Linsey Ramirez MA

## 2024-01-31 NOTE — PROGRESS NOTES
HEMATOLOGY ONCOLOGY OUTPATIENT FOLLOW-UP      Patient name: Farhad Khan  : 1980  MRN: [id number]  Primary Care Physician: Jacqueline Dwyer APRN  Referring Physician: No ref. provider found  Reason For Consult:     Chief Complaint   Patient presents with    Follow-up     Thrombocytopenia     HPI:   History of Present Illness:  Farhad Khan is 43 y.o. male who presented to the office on 09/15/22 for consultation regarding    9/15/2022: Mr. Khan was referred for the investigation of leukopenia, neutropenia and thrombocytopenia.  He was first noted to have moderate leukopenia approximately 1 year before this visit.  Over the course of several months the leukopenia became somewhat worse and at some point his total white cells were less than 2.0.  Approximately 3 weeks before this visit this had improved and the total white cell count was up to 2.4 and this was associated to moderate neutropenia.  On this basis he was referred.  He had no new symptoms.  For some time, perhaps as many as 3 years, he had been experiencing fatigue and general malaise.  He had not had any changes in his work and he was able to fulfill all his duties.  He had been afebrile and without weight loss.  For at least 2 or 3 years he had maintained a similar weight.  He had been without chest pains or cough.  And denied expectoration or hemoptysis.  No dysphagia.  He had had no abdominal pain and denied diarrhea or dysuria.  He had not experienced any peripheral edema.  No skin rash.  For approximately 2 years he has been taking a combination of medications that included lisinopril, omeprazole and rosuvastatin.    2022: In the office to review the results of his tests.  Felt reasonably well and perhaps better than the previous week he had been much more fatigued at that time.  Eating well.  Reported frequent diarrhea but only intermittently.  Denied fevers.  Had had no chest pains and no increasing  dyspnea.  No abdominal pain at the time but did admit to intermittent right upper quadrant pain.  No edema.  The met laboratory exams were essentially unremarkable though they did confirm leukopenia, neutropenia and thrombocytopenia.  No suggestion of a bone marrow disorder, howeve.  The ultrasound of the abdomen confirmed the presence of a liver with cirrhotic morphology and splenomegaly.  It was felt that the explanation for his cytopenias was this splenomegaly.    11/18/2022: Feeling reasonably well. Continued to work and described being more active than before. Eating well and stable weight, though his wife described that he did not eat enough to justify his weight. Seen by the nurse practitioner at Dr. Knight's office. He was offered a clinical trial but did not offer anything else. The physical exam was unchanged. He persisted with moderate leukopenia and neutropenia, as well as thrombocytopenia. They had not changed and were not associated to any other problems. A decision was made to continue to follow.     5/19/2023: Without new symptoms.  Was seen at the gastroenterologist office and was placed on a trial testing, and on 2 drugs, semaglutide for the treatment of steatohepatitis.  Had lost approximately 12 pounds and since the commencement of the study and was experiencing some nausea as well as a reduction in his appetite.  He had been afebrile.  The exam disclosed no changes.  A dentulous, without any oral ulcers.  No palpable lymphadenopathy.  Abdomen protuberant.  Difficult to tell if the liver or spleen were enlarged as before.  No peripheral edema.  The white cell count had decreased to 1860/mm³, with neutrophils, as well lower than 1000/mm³ at 860/mm³.  The platelet count remained in the same range as before at 118,000/mm³.  A long discussion was had with him in regards to prevention of infections and neutropenic fever.  He was also asked to call immediately if he should have a fever.  Antinuclear  antibodies and complement were requested and he was asked to return in 3 weeks.  Also requested information on the clinical trial to see what other drug he could be receiving.     6/9/2023: Feeling well and without new symptoms.  Continues to participate in a clinical trial and had some modifications to his doses.  He continues to lose weight and has been consistently losing approximately 5 pounds per week.  No appetite.  Frequently nauseated but no diarrhea.  Afebrile.  No chest pains or cough.  On exam no changes.  The laboratory exams were reviewed.  The neutropenia had resolved.  RADHA and complement unremarkable.  Discussed with him.    9/29/2023: Not feeling well.  Nauseated.  Unable to eat.  Losing weight rapidly.  This started with an increase in the dose of the study medications that he has been receiving for the treatment of his steatohepatitis.  A few days ago he received some intravenous fluids and the dose of the medications was reduced.  However, in spite of that, he has persisted with the same symptoms.  He has been afebrile.  On exam he is well-hydrated.  Has lost a large amount of weight.  No jaundice.  The lungs are clear.  The heart is regular.  The abdomen is soft.  Liver and spleen are nonenlarged.  No edema.  The laboratory exams reported a white blood cell count of 2640/mm³.  He had minor absolute lymphocytopenia, however, his neutrophil count was well within the normal range.  Hemoglobin normal at 16.7 g/dL with normocytic red cells and platelets improved at 131,000/mm³.  A decision was made to continue to observe without intervention.  A discussion was had with him about his symptoms.  He had been tempted to discontinue the study medications.  Given that he has not been able to eat and that he is constantly vomiting it probably would be reasonable to stop.    2/5/2024: Much better.  Following the previous appointment, sometime in October 2023, he developed abdominal pain and sought attention  from the hospital.  A diagnosis of appendicitis was made.  He was transferred to [facility] and underwent a laparoscopic appendectomy without complications.  However, following discharge, he could not walk because of muscle weakness.  He has been receiving physical therapy since then and is progressively stronger, now walking.  He is off of the study protocol.  He is able to eat.  He has maintained a stable weight.  He has been diagnosed with pathology of the gallbladder and is going to receive a cholecystectomy sometime in the future.  He has had no dyspnea or chest pains.  On exam he appears to be a chronically ill man.  He does not seem in distress.  He is not jaundiced.  He is a dentulous.  There is no palpable lymphadenopathy.  The lungs are clear.  The heart is regular.  The abdomen is rounded and protuberant but soft.  There is no edema.  Laboratory exams reported a white blood cell count of 2007/mm³ with eosinopenia, and eosinophil count of 0/mm³ and moderate neutropenia with an absolute neutrophil number of 1180/mm³.  The hemoglobin and platelet count are today unremarkable.  A decision was made to continue to observe.  Will measure again B12 and folic acid and will see with results in approximately 3 months.    The following portions of the patient's history were reviewed and updated as appropriate: allergies, current medications, past family history, past medical history, past social history, past surgical history and problem list.    Past Medical History:   Diagnosis Date    B12 deficiency     Chronic fatigue     Dizziness     Elevated LFTs     Fatty liver     NON ALCOHOLIC    GERD (gastroesophageal reflux disease)     Gout     Headache     HSV infection     Hyperglycemia     Hyperlipidemia     Hypertension     Obesity     Obstructive sleep apnea     Onychomycosis     Pain in joint, multiple sites     Sebaceous cyst     Skin nodule     OF ARM, LEFT    Snoring     Vision changes       History reviewed. No pertinent surgical history.      Current Outpatient Medications:     Cyanocobalamin (VITAMIN B-12 PO), Take  by mouth., Disp: , Rfl:     Docusate Sodium (COLACE PO), Per instructions 2 TIMES DAILY (route: oral), Disp: , Rfl:     gabapentin (NEURONTIN) 400 MG capsule, Take 1 capsule by mouth 3 (Three) Times a Day., Disp: 90 capsule, Rfl: 2    Gas-X Ultra Strength 180 MG capsule, , Disp: , Rfl:     HYDROcodone-acetaminophen (NORCO) 5-325 MG per tablet, Take 1 tablet by mouth Every 6 (Six) Hours As Needed for Severe Pain., Disp: 60 tablet, Rfl: 0    hydrOXYzine (ATARAX) 10 MG tablet, Take 1 tablet by mouth 3 (Three) Times a Day As Needed for Itching., Disp: 45 tablet, Rfl: 0    lisinopril (PRINIVIL,ZESTRIL) 10 MG tablet, TAKE 1 TABLET BY MOUTH DAILY (Patient taking differently: Take 1 tablet by mouth Daily. As needed .), Disp: 90 tablet, Rfl: 1    omeprazole (priLOSEC) 40 MG capsule, Take 1 capsule by mouth Daily., Disp: 90 capsule, Rfl: 1    spironolactone (Aldactone) 25 MG tablet, Take 1 tablet by mouth Daily As Needed (Swelling). As needed, Disp: 90 tablet, Rfl: 1    pramipexole (MIRAPEX) 0.125 MG tablet, TAKE 1 TABLET BY MOUTH EVERY NIGHT FOR RESTLESS LEGS (Patient not taking: Reported on 2/5/2024), Disp: 180 tablet, Rfl: 0    Allergies   Allergen Reactions    Ozempic (0.25 Or 0.5 Mg-Dose) [Semaglutide(0.25 Or 0.5mg-Dos)] GI Intolerance    Sulfa Antibiotics Other (See Comments)     Rash, swelling, tingling, peeling.     Family History   Problem Relation Age of Onset    Sleep apnea Father     COPD Father     Other Brother         Desmoid tumor of the abdomen    Hypertension Other     Rheum arthritis Other      Cancer-related family history is not on file.    Social History     Tobacco Use    Smoking status: Never    Smokeless tobacco: Never   Vaping Use    Vaping Use: Never used   Substance Use Topics    Alcohol use: No    Drug use: No     Social History     Social History Narrative    Not  on file      ROS:     Review of Systems   Constitutional:  Positive for fatigue. Negative for activity change, appetite change, chills, diaphoresis, fever and unexpected weight change.   HENT:  Negative for congestion, dental problem, drooling, ear discharge, ear pain, facial swelling, hearing loss, mouth sores, nosebleeds, postnasal drip, rhinorrhea, sinus pressure, sinus pain, sneezing, sore throat, tinnitus, trouble swallowing and voice change.    Eyes:  Negative for photophobia, pain, discharge, redness, itching and visual disturbance.   Respiratory:  Negative for apnea, cough, choking, chest tightness, shortness of breath, wheezing and stridor.    Cardiovascular:  Negative for chest pain, palpitations and leg swelling.   Gastrointestinal:  Positive for nausea and vomiting. Negative for abdominal distention, abdominal pain, anal bleeding, blood in stool, constipation, diarrhea and rectal pain.   Endocrine: Negative for cold intolerance, heat intolerance, polydipsia and polyuria.   Genitourinary:  Negative for decreased urine volume, difficulty urinating, dysuria, flank pain, frequency, genital sores, hematuria and urgency.   Musculoskeletal:  Negative for arthralgias, back pain, gait problem, joint swelling, myalgias, neck pain and neck stiffness.   Skin:  Negative for color change, pallor and rash.   Neurological:  Positive for weakness. Negative for dizziness, tremors, seizures, syncope, facial asymmetry, speech difficulty, light-headedness, numbness and headaches.   Hematological:  Negative for adenopathy. Does not bruise/bleed easily.   Psychiatric/Behavioral:  Negative for agitation, behavioral problems, confusion, decreased concentration, hallucinations, self-injury, sleep disturbance and suicidal ideas. The patient is not nervous/anxious.      Objective:    Vitals:    02/05/24 0914   BP: 112/73   Pulse: 82   Temp: 97.5 °F (36.4 °C)   TempSrc: Oral   SpO2: 98%   Weight: 94.8 kg (209 lb)   Height: 162.6 cm  "(64\")   PainSc: 0-No pain       Body mass index is 35.87 kg/m².  ECOG  (0) Fully active, able to carry on all predisease performance without restriction    Physical Exam:     Physical Exam  Constitutional:       General: He is not in acute distress.     Appearance: He is ill-appearing. He is not toxic-appearing or diaphoretic.      Comments: Well-built, oriented and in no distress but seems ill.   HENT:      Head: Normocephalic and atraumatic.      Right Ear: External ear normal.      Left Ear: External ear normal.      Nose: Nose normal.      Mouth/Throat:      Mouth: Mucous membranes are moist.      Pharynx: Oropharynx is clear.      Comments: Edentulous  Eyes:      General: No scleral icterus.        Right eye: No discharge.         Left eye: No discharge.      Conjunctiva/sclera: Conjunctivae normal.      Pupils: Pupils are equal, round, and reactive to light.   Cardiovascular:      Rate and Rhythm: Normal rate and regular rhythm.      Pulses: Normal pulses.      Heart sounds: Normal heart sounds. No murmur heard.     No friction rub. No gallop.   Pulmonary:      Effort: No respiratory distress.      Breath sounds: No stridor. No wheezing, rhonchi or rales.   Chest:      Chest wall: No tenderness.   Abdominal:      General: Bowel sounds are normal. There is no distension.      Palpations: Abdomen is soft. There is no mass.      Tenderness: There is no abdominal tenderness. There is no right CVA tenderness, left CVA tenderness, guarding or rebound.      Comments: Protuberant, soft and not tender. The spleen is not palpable because of the body habitus.    Musculoskeletal:         General: No swelling, tenderness, deformity or signs of injury.      Cervical back: No rigidity.      Right lower leg: No edema.      Left lower leg: No edema.   Lymphadenopathy:      Cervical: No cervical adenopathy.   Skin:     General: Skin is warm and dry.      Coloration: Skin is not jaundiced.      Findings: No bruising or rash. "   Neurological:      General: No focal deficit present.      Mental Status: He is alert and oriented to person, place, and time.      Gait: Gait normal.   Psychiatric:         Mood and Affect: Mood normal.         Behavior: Behavior normal.         Thought Content: Thought content normal.         Judgment: Judgment normal.     DEMAR Greenwood MD performed the physical exam on 2/5/2024 as documented above.    Lab Results - Last 18 Months   Lab Units 02/05/24  0912 01/15/24  1446 10/15/23  0129   WBC 10*3/mm3 2.07* 2.75* 8.39   HEMOGLOBIN g/dL 12.4* 13.0 15.4   HEMATOCRIT % 37.9 38.0 44.2   PLATELETS 10*3/mm3 162 151 194   MCV fL 88.3 86.4 82.3     Lab Results - Last 18 Months   Lab Units 01/15/24  1446 10/06/23  1237 09/29/23  0924 07/31/23  0818   SODIUM mmol/L 136  --  135* 140   POTASSIUM mmol/L 4.0 3.7 3.8 3.4*   CHLORIDE mmol/L 99  --  96* 100   CO2 mmol/L 25.8  --  23.0 25.6   BUN mg/dL 6  --  7 5*   CREATININE mg/dL 0.60*  --  0.91 0.73*   CALCIUM mg/dL 8.2*  --  9.4 8.8   BILIRUBIN mg/dL 1.1  --  1.6* 1.1   ALK PHOS U/L 234*  --  121* 140*   ALT (SGPT) U/L 5  --  <5 8   AST (SGOT) U/L 16  --  20 16   GLUCOSE mg/dL 95  --  98 118*     Lab Results   Component Value Date    GLUCOSE 95 01/15/2024    BUN 6 01/15/2024    CREATININE 0.60 (L) 01/15/2024    EGFRIFNONA 107 09/16/2021    BCR 10.0 01/15/2024    K 4.0 01/15/2024    CO2 25.8 01/15/2024    CALCIUM 8.2 (L) 01/15/2024    ALBUMIN 2.6 (L) 01/15/2024    LABIL2 1.2 11/16/2018    AST 16 01/15/2024    ALT 5 01/15/2024     Uric Acid   Date Value Ref Range Status   08/04/2022 4.1 3.4 - 7.0 mg/dL Final     Assessment & Plan     Assessment:  Leukopenia and neutropenia: Not much change.  This most likely is secondary to cirrhosis and splenomegaly.  Will continue to monitor.  I have ordered B12 and folate levels and will check his iron again.  Thrombocytopenia: Continues to improve.  At this time no intervention from this point of view.  Steatohepatitis and cirrhosis:  Was treated under a clinical trial that has now been discontinued.  He will return to see me in approximately 3 months with new results.    Plan:  1.  As above.    Sravan Greenwood MD on 2/5/2024 at 9:57 AM.

## 2024-02-05 ENCOUNTER — LAB (OUTPATIENT)
Dept: LAB | Facility: HOSPITAL | Age: 44
End: 2024-02-05
Payer: COMMERCIAL

## 2024-02-05 ENCOUNTER — OFFICE VISIT (OUTPATIENT)
Dept: ONCOLOGY | Facility: CLINIC | Age: 44
End: 2024-02-05
Payer: COMMERCIAL

## 2024-02-05 VITALS
WEIGHT: 209 LBS | HEIGHT: 64 IN | TEMPERATURE: 97.5 F | HEART RATE: 82 BPM | BODY MASS INDEX: 35.68 KG/M2 | SYSTOLIC BLOOD PRESSURE: 112 MMHG | DIASTOLIC BLOOD PRESSURE: 73 MMHG | OXYGEN SATURATION: 98 %

## 2024-02-05 DIAGNOSIS — D69.6 THROMBOCYTOPENIA: Primary | ICD-10-CM

## 2024-02-05 LAB
ALBUMIN SERPL-MCNC: 2.7 G/DL (ref 3.5–5.2)
ALBUMIN/GLOB SERPL: 0.7 G/DL
ALP SERPL-CCNC: 195 U/L (ref 39–117)
ALT SERPL W P-5'-P-CCNC: <5 U/L (ref 1–41)
ANION GAP SERPL CALCULATED.3IONS-SCNC: 6 MMOL/L (ref 5–15)
AST SERPL-CCNC: 14 U/L (ref 1–40)
BASOPHILS # BLD AUTO: 0 10*3/MM3 (ref 0–0.2)
BASOPHILS NFR BLD AUTO: 0 % (ref 0–1.5)
BILIRUB SERPL-MCNC: 1 MG/DL (ref 0–1.2)
BUN SERPL-MCNC: 4 MG/DL (ref 6–20)
BUN/CREAT SERPL: 6.3 (ref 7–25)
CALCIUM SPEC-SCNC: 8.5 MG/DL (ref 8.6–10.5)
CHLORIDE SERPL-SCNC: 101 MMOL/L (ref 98–107)
CO2 SERPL-SCNC: 29 MMOL/L (ref 22–29)
CREAT SERPL-MCNC: 0.64 MG/DL (ref 0.76–1.27)
DEPRECATED RDW RBC AUTO: 48.6 FL (ref 37–54)
EGFRCR SERPLBLD CKD-EPI 2021: 119 ML/MIN/1.73
EOSINOPHIL # BLD AUTO: 0 10*3/MM3 (ref 0–0.4)
EOSINOPHIL NFR BLD AUTO: 0 % (ref 0.3–6.2)
ERYTHROCYTE [DISTWIDTH] IN BLOOD BY AUTOMATED COUNT: 15.3 % (ref 12.3–15.4)
FERRITIN SERPL-MCNC: 81.54 NG/ML (ref 30–400)
FOLATE SERPL-MCNC: 2.48 NG/ML (ref 4.78–24.2)
GLOBULIN UR ELPH-MCNC: 4.1 GM/DL
GLUCOSE SERPL-MCNC: 101 MG/DL (ref 65–99)
HCT VFR BLD AUTO: 37.9 % (ref 37.5–51)
HGB BLD-MCNC: 12.4 G/DL (ref 13–17.7)
IRON 24H UR-MRATE: 58 MCG/DL (ref 59–158)
IRON SATN MFR SERPL: 29 % (ref 20–50)
LYMPHOCYTES # BLD AUTO: 0.7 10*3/MM3 (ref 0.7–3.1)
LYMPHOCYTES NFR BLD AUTO: 33.8 % (ref 19.6–45.3)
MCH RBC QN AUTO: 28.9 PG (ref 26.6–33)
MCHC RBC AUTO-ENTMCNC: 32.7 G/DL (ref 31.5–35.7)
MCV RBC AUTO: 88.3 FL (ref 79–97)
MONOCYTES # BLD AUTO: 0.19 10*3/MM3 (ref 0.1–0.9)
MONOCYTES NFR BLD AUTO: 9.2 % (ref 5–12)
NEUTROPHILS NFR BLD AUTO: 1.18 10*3/MM3 (ref 1.7–7)
NEUTROPHILS NFR BLD AUTO: 57 % (ref 42.7–76)
PLATELET # BLD AUTO: 162 10*3/MM3 (ref 140–450)
PMV BLD AUTO: 9.6 FL (ref 6–12)
POTASSIUM SERPL-SCNC: 4 MMOL/L (ref 3.5–5.2)
PROT SERPL-MCNC: 6.8 G/DL (ref 6–8.5)
RBC # BLD AUTO: 4.29 10*6/MM3 (ref 4.14–5.8)
SODIUM SERPL-SCNC: 136 MMOL/L (ref 136–145)
TIBC SERPL-MCNC: 200 MCG/DL (ref 298–536)
TRANSFERRIN SERPL-MCNC: 134 MG/DL (ref 200–360)
VIT B12 BLD-MCNC: 1303 PG/ML (ref 211–946)
WBC NRBC COR # BLD AUTO: 2.07 10*3/MM3 (ref 3.4–10.8)

## 2024-02-05 PROCEDURE — 84466 ASSAY OF TRANSFERRIN: CPT | Performed by: INTERNAL MEDICINE

## 2024-02-05 PROCEDURE — 82746 ASSAY OF FOLIC ACID SERUM: CPT | Performed by: INTERNAL MEDICINE

## 2024-02-05 PROCEDURE — 82607 VITAMIN B-12: CPT | Performed by: INTERNAL MEDICINE

## 2024-02-05 PROCEDURE — 82728 ASSAY OF FERRITIN: CPT | Performed by: INTERNAL MEDICINE

## 2024-02-05 PROCEDURE — 80053 COMPREHEN METABOLIC PANEL: CPT | Performed by: INTERNAL MEDICINE

## 2024-02-05 PROCEDURE — 36415 COLL VENOUS BLD VENIPUNCTURE: CPT

## 2024-02-05 PROCEDURE — 83540 ASSAY OF IRON: CPT | Performed by: INTERNAL MEDICINE

## 2024-02-05 PROCEDURE — 85025 COMPLETE CBC W/AUTO DIFF WBC: CPT

## 2024-02-05 PROCEDURE — 99213 OFFICE O/P EST LOW 20 MIN: CPT | Performed by: INTERNAL MEDICINE

## 2024-02-05 RX ORDER — SIMETHICONE 180 MG/1
CAPSULE, LIQUID FILLED ORAL
COMMUNITY
Start: 2024-02-01

## 2024-02-13 DIAGNOSIS — R20.0 NUMBNESS AND TINGLING IN BOTH HANDS: ICD-10-CM

## 2024-02-13 DIAGNOSIS — Z90.49 S/P APPENDECTOMY: ICD-10-CM

## 2024-02-13 DIAGNOSIS — R20.2 NUMBNESS AND TINGLING IN BOTH HANDS: ICD-10-CM

## 2024-02-15 ENCOUNTER — OFFICE (AMBULATORY)
Dept: URBAN - METROPOLITAN AREA CLINIC 64 | Facility: CLINIC | Age: 44
End: 2024-02-15
Payer: COMMERCIAL

## 2024-02-15 VITALS
HEART RATE: 79 BPM | SYSTOLIC BLOOD PRESSURE: 106 MMHG | HEIGHT: 64 IN | DIASTOLIC BLOOD PRESSURE: 80 MMHG | WEIGHT: 213 LBS

## 2024-02-15 DIAGNOSIS — K74.69 OTHER CIRRHOSIS OF LIVER: ICD-10-CM

## 2024-02-15 DIAGNOSIS — K80.20 CALCULUS OF GALLBLADDER WITHOUT CHOLECYSTITIS WITHOUT OBSTRU: ICD-10-CM

## 2024-02-15 DIAGNOSIS — R14.0 ABDOMINAL DISTENSION (GASEOUS): ICD-10-CM

## 2024-02-15 DIAGNOSIS — Z86.010 PERSONAL HISTORY OF COLONIC POLYPS: ICD-10-CM

## 2024-02-15 PROCEDURE — 99214 OFFICE O/P EST MOD 30 MIN: CPT | Performed by: INTERNAL MEDICINE

## 2024-02-15 RX ORDER — SPIRONOLACTONE 50 MG/1
50 TABLET, FILM COATED ORAL
Qty: 30 | Refills: 11 | Status: ACTIVE
Start: 2024-02-15

## 2024-02-15 RX ORDER — HYDROCODONE BITARTRATE AND ACETAMINOPHEN 5; 325 MG/1; MG/1
1 TABLET ORAL EVERY 6 HOURS PRN
Qty: 60 TABLET | Refills: 0 | Status: SHIPPED | OUTPATIENT
Start: 2024-02-15

## 2024-03-12 RX ORDER — HYDROXYZINE HYDROCHLORIDE 10 MG/1
TABLET, FILM COATED ORAL
Qty: 45 TABLET | Refills: 0 | Status: SHIPPED | OUTPATIENT
Start: 2024-03-12

## 2024-03-12 RX ORDER — SPIRONOLACTONE 25 MG/1
25 TABLET ORAL DAILY PRN
Qty: 30 TABLET | Refills: 0 | Status: SHIPPED | OUTPATIENT
Start: 2024-03-12

## 2024-03-14 DIAGNOSIS — Z90.49 S/P APPENDECTOMY: ICD-10-CM

## 2024-03-14 DIAGNOSIS — R20.2 NUMBNESS AND TINGLING IN BOTH HANDS: ICD-10-CM

## 2024-03-14 DIAGNOSIS — R20.0 NUMBNESS AND TINGLING IN BOTH HANDS: ICD-10-CM

## 2024-03-17 RX ORDER — HYDROCODONE BITARTRATE AND ACETAMINOPHEN 5; 325 MG/1; MG/1
1 TABLET ORAL EVERY 6 HOURS PRN
Qty: 60 TABLET | Refills: 0 | Status: SHIPPED | OUTPATIENT
Start: 2024-03-17

## 2024-04-05 DIAGNOSIS — E78.2 MIXED HYPERLIPIDEMIA: Primary | ICD-10-CM

## 2024-04-05 DIAGNOSIS — I10 HYPERTENSION, UNSPECIFIED TYPE: ICD-10-CM

## 2024-04-08 ENCOUNTER — CLINICAL SUPPORT (OUTPATIENT)
Dept: FAMILY MEDICINE CLINIC | Facility: CLINIC | Age: 44
End: 2024-04-08
Payer: COMMERCIAL

## 2024-04-08 DIAGNOSIS — I10 HYPERTENSION, UNSPECIFIED TYPE: Primary | ICD-10-CM

## 2024-04-08 PROCEDURE — 80061 LIPID PANEL: CPT | Performed by: NURSE PRACTITIONER

## 2024-04-08 PROCEDURE — 85027 COMPLETE CBC AUTOMATED: CPT | Performed by: NURSE PRACTITIONER

## 2024-04-08 PROCEDURE — 36415 COLL VENOUS BLD VENIPUNCTURE: CPT | Performed by: NURSE PRACTITIONER

## 2024-04-08 PROCEDURE — 84443 ASSAY THYROID STIM HORMONE: CPT | Performed by: NURSE PRACTITIONER

## 2024-04-08 PROCEDURE — 80053 COMPREHEN METABOLIC PANEL: CPT | Performed by: NURSE PRACTITIONER

## 2024-04-08 NOTE — PROGRESS NOTES
Venipuncture Blood Specimen Collection  Venipuncture performed in the left arm by Melanie Nettles MA with good hemostasis. Patient tolerated the procedure well without complications.   04/08/24   Melanie Nettles MA

## 2024-04-09 ENCOUNTER — TELEPHONE (OUTPATIENT)
Dept: FAMILY MEDICINE CLINIC | Facility: CLINIC | Age: 44
End: 2024-04-09
Payer: COMMERCIAL

## 2024-04-09 LAB
ALBUMIN SERPL-MCNC: 2.9 G/DL (ref 3.5–5.2)
ALBUMIN/GLOB SERPL: 0.8 G/DL
ALP SERPL-CCNC: 177 U/L (ref 39–117)
ALT SERPL W P-5'-P-CCNC: 6 U/L (ref 1–41)
ANION GAP SERPL CALCULATED.3IONS-SCNC: 6.9 MMOL/L (ref 5–15)
AST SERPL-CCNC: 22 U/L (ref 1–40)
BILIRUB SERPL-MCNC: 0.7 MG/DL (ref 0–1.2)
BUN SERPL-MCNC: 6 MG/DL (ref 6–20)
BUN/CREAT SERPL: 9.5 (ref 7–25)
CALCIUM SPEC-SCNC: 8.5 MG/DL (ref 8.6–10.5)
CHLORIDE SERPL-SCNC: 105 MMOL/L (ref 98–107)
CHOLEST SERPL-MCNC: 127 MG/DL (ref 0–200)
CO2 SERPL-SCNC: 27.1 MMOL/L (ref 22–29)
CREAT SERPL-MCNC: 0.63 MG/DL (ref 0.76–1.27)
DEPRECATED RDW RBC AUTO: 42.3 FL (ref 37–54)
EGFRCR SERPLBLD CKD-EPI 2021: 119.5 ML/MIN/1.73
ERYTHROCYTE [DISTWIDTH] IN BLOOD BY AUTOMATED COUNT: 13.7 % (ref 12.3–15.4)
GLOBULIN UR ELPH-MCNC: 3.6 GM/DL
GLUCOSE SERPL-MCNC: 101 MG/DL (ref 65–99)
HCT VFR BLD AUTO: 33.5 % (ref 37.5–51)
HDLC SERPL-MCNC: 40 MG/DL (ref 40–60)
HGB BLD-MCNC: 11.2 G/DL (ref 13–17.7)
LDLC SERPL CALC-MCNC: 73 MG/DL (ref 0–100)
LDLC/HDLC SERPL: 1.85 {RATIO}
MCH RBC QN AUTO: 28.6 PG (ref 26.6–33)
MCHC RBC AUTO-ENTMCNC: 33.4 G/DL (ref 31.5–35.7)
MCV RBC AUTO: 85.5 FL (ref 79–97)
PLATELET # BLD AUTO: 135 10*3/MM3 (ref 140–450)
PMV BLD AUTO: 10.4 FL (ref 6–12)
POTASSIUM SERPL-SCNC: 4.4 MMOL/L (ref 3.5–5.2)
PROT SERPL-MCNC: 6.5 G/DL (ref 6–8.5)
RBC # BLD AUTO: 3.92 10*6/MM3 (ref 4.14–5.8)
SODIUM SERPL-SCNC: 139 MMOL/L (ref 136–145)
TRIGL SERPL-MCNC: 66 MG/DL (ref 0–150)
TSH SERPL DL<=0.05 MIU/L-ACNC: 2.53 UIU/ML (ref 0.27–4.2)
VLDLC SERPL-MCNC: 14 MG/DL (ref 5–40)
WBC NRBC COR # BLD AUTO: 1.85 10*3/MM3 (ref 3.4–10.8)

## 2024-04-11 DIAGNOSIS — Z90.49 S/P APPENDECTOMY: ICD-10-CM

## 2024-04-11 DIAGNOSIS — R20.0 NUMBNESS AND TINGLING IN BOTH HANDS: ICD-10-CM

## 2024-04-11 DIAGNOSIS — R20.2 NUMBNESS AND TINGLING IN BOTH HANDS: ICD-10-CM

## 2024-04-12 RX ORDER — GABAPENTIN 400 MG/1
400 CAPSULE ORAL 3 TIMES DAILY
Qty: 90 CAPSULE | Refills: 2 | Status: SHIPPED | OUTPATIENT
Start: 2024-04-12

## 2024-04-12 RX ORDER — HYDROCODONE BITARTRATE AND ACETAMINOPHEN 5; 325 MG/1; MG/1
1 TABLET ORAL EVERY 6 HOURS PRN
Qty: 60 TABLET | Refills: 0 | Status: SHIPPED | OUTPATIENT
Start: 2024-04-12

## 2024-04-15 ENCOUNTER — OFFICE VISIT (OUTPATIENT)
Dept: FAMILY MEDICINE CLINIC | Facility: CLINIC | Age: 44
End: 2024-04-15
Payer: COMMERCIAL

## 2024-04-15 VITALS
TEMPERATURE: 98.5 F | BODY MASS INDEX: 37.56 KG/M2 | HEIGHT: 64 IN | WEIGHT: 220 LBS | DIASTOLIC BLOOD PRESSURE: 76 MMHG | HEART RATE: 80 BPM | SYSTOLIC BLOOD PRESSURE: 121 MMHG | OXYGEN SATURATION: 99 %

## 2024-04-15 DIAGNOSIS — D70.9 NEUTROPENIA, UNSPECIFIED TYPE: ICD-10-CM

## 2024-04-15 DIAGNOSIS — K75.81 NASH (NONALCOHOLIC STEATOHEPATITIS): ICD-10-CM

## 2024-04-15 DIAGNOSIS — E88.09 HYPOALBUMINEMIA: ICD-10-CM

## 2024-04-15 DIAGNOSIS — D69.6 THROMBOCYTOPENIA: ICD-10-CM

## 2024-04-15 DIAGNOSIS — E53.8 B12 DEFICIENCY: ICD-10-CM

## 2024-04-15 DIAGNOSIS — R60.0 BILATERAL EDEMA OF LOWER EXTREMITY: Primary | ICD-10-CM

## 2024-04-15 DIAGNOSIS — L30.9 ECZEMA, UNSPECIFIED TYPE: ICD-10-CM

## 2024-04-15 DIAGNOSIS — M79.10 MYALGIA: ICD-10-CM

## 2024-04-15 RX ORDER — CLOBETASOL PROPIONATE 0.5 MG/G
1 CREAM TOPICAL 2 TIMES DAILY
Qty: 30 G | Refills: 2 | Status: SHIPPED | OUTPATIENT
Start: 2024-04-15

## 2024-04-15 RX ORDER — METHYLPREDNISOLONE 4 MG/1
TABLET ORAL
Qty: 21 TABLET | Refills: 0 | Status: SHIPPED | OUTPATIENT
Start: 2024-04-15

## 2024-04-15 RX ORDER — SPIRONOLACTONE 50 MG/1
50 TABLET, FILM COATED ORAL DAILY PRN
Qty: 90 TABLET | Refills: 1 | Status: SHIPPED | OUTPATIENT
Start: 2024-04-15

## 2024-04-15 NOTE — PROGRESS NOTES
Chief Complaint  Chief Complaint   Patient presents with    Follow-up     3 month f/u    Itching     Patient states that he has red bumps and sever itching on arms,legs and upper back. Patient states that it has been a ongoing issue for 4 weeks.    Edema     Patient states both of his lower legs have been swelling patient states no sweeping from them. Patient states this has been ongoing to a couple weeks now.           Subjective          Farhad Khan presents to [facility] for   History of Present Illness    DEGROOT, with splenomegaly and leukopenia, he is following with hematology and gastro. He underwent appendectomy on 10/10/2023, course was complicated. He is having swelling in his ankles, spironolactone was ordered to use as needed but taking daily.  Numbness and tingling in both hands is improving with PT, using less norco now. He is tolerating a regular diet, has good appetite. Recommended by Dr. Pizano if he continues to have gas and bloating was recommended for general surgery for cholecystectomy, he has been referred to Dr. Hernandez but holding off on this for now.   -On 1/2/2024 he underwent ERCP, esophageal dilation and biliary sphincterotomy of the common bile duct per Dr. Pizano.    -has f/u appt with Dr. pizano c-scope next week     Leukopenia, thrombocytopenia, and anemia, wbc recently 1.85, notified Dr. Greenwood.     He is doing PT/OT 2 days/week at [facility], he joined a gym. Feels stronger, maintaining weight. [id number] steps/day. C/o stiffness with standing and in am's. Swelling has improved with spironolactone.      HTN, stable on meds and takes as directed, denies chest pain, headache, shortness of air, palpitations and swelling of extremities.     Abd pain and bloating, now mostly upper abdominal, BM's are more regular, some diarrhea, takes miralax and colace prn. He has tried Gas x, activated charcoal. Appetite has improved but there are many triggering  foods he now avoids.     Itching, BUE and BLE       The following portions of the patient's history were reviewed and updated as appropriate: allergies, current medications, past family history, past medical history, past social history, past surgical history and problem list.    Past Medical History:   Diagnosis Date    B12 deficiency     Chronic fatigue     Dizziness     Elevated LFTs     Fatty liver     GERD (gastroesophageal reflux disease)     Gout     Headache     HSV infection     Hyperglycemia     Hyperlipidemia     Hypertension     Obesity     Obstructive sleep apnea     Onychomycosis     Pain in joint, multiple sites     Sebaceous cyst     Skin nodule     Snoring     Vision changes      History reviewed. No pertinent surgical history.  Family History   Problem Relation Age of Onset    Sleep apnea Father     COPD Father     Other Brother         Desmoid tumor of the abdomen    Hypertension Other     Rheum arthritis Other      Social History     Tobacco Use    Smoking status: Never    Smokeless tobacco: Never   Substance Use Topics    Alcohol use: No       Current Outpatient Medications:     Docusate Sodium (COLACE PO), Per instructions 2 TIMES DAILY (route: oral), Disp: , Rfl:     gabapentin (NEURONTIN) 400 MG capsule, TAKE 1 CAPSULE BY MOUTH 3 TIMES A DAY, Disp: 90 capsule, Rfl: 2    Gas-X Ultra Strength 180 MG capsule, , Disp: , Rfl:     HYDROcodone-acetaminophen (NORCO) 5-325 MG per tablet, Take 1 tablet by mouth Every 6 (Six) Hours As Needed for Severe Pain., Disp: 60 tablet, Rfl: 0    hydrOXYzine (ATARAX) 10 MG tablet, TAKE 1 TABLET BY MOUTH 3 TIMES A DAY AS NEEDED for itching, Disp: 45 tablet, Rfl: 0    lisinopril (PRINIVIL,ZESTRIL) 10 MG tablet, TAKE 1 TABLET BY MOUTH DAILY (Patient taking differently: Take 1 tablet by mouth Daily. As needed .), Disp: 90 tablet, Rfl: 1    omeprazole (priLOSEC) 40 MG capsule, Take 1 capsule by mouth Daily., Disp: 90 capsule, Rfl: 1    spironolactone (ALDACTONE) 50 MG  "tablet, Take 1 tablet by mouth Daily As Needed (swelling)., Disp: 90 tablet, Rfl: 1    clobetasol propionate (TEMOVATE) 0.05 % cream, Apply 1 Application topically to the appropriate area as directed 2 (Two) Times a Day., Disp: 30 g, Rfl: 2    methylPREDNISolone (MEDROL) 4 MG dose pack, Take as directed on package instructions., Disp: 21 tablet, Rfl: 0    pramipexole (MIRAPEX) 0.125 MG tablet, TAKE 1 TABLET BY MOUTH EVERY NIGHT FOR RESTLESS LEGS (Patient not taking: Reported on 4/15/2024), Disp: 180 tablet, Rfl: 0    Objective   Vital Signs:   /76 (BP Location: Left arm, Patient Position: Sitting, Cuff Size: Adult)   Pulse 80   Temp 98.5 °F (36.9 °C) (Temporal)   Ht 162.6 cm (64\")   Wt 99.8 kg (220 lb)   SpO2 99%   BMI 37.76 kg/m²           Physical Exam  Constitutional:       General: He is not in acute distress.     Appearance: Normal appearance. He is well-developed. He is not ill-appearing or diaphoretic.   HENT:      Head: Normocephalic.   Eyes:      Conjunctiva/sclera: Conjunctivae normal.      Pupils: Pupils are equal, round, and reactive to light.   Neck:      Thyroid: No thyromegaly.      Vascular: No JVD.   Cardiovascular:      Rate and Rhythm: Normal rate and regular rhythm.      Heart sounds: Normal heart sounds. No murmur heard.  Pulmonary:      Effort: Pulmonary effort is normal. No respiratory distress.      Breath sounds: Normal breath sounds. No wheezing or rhonchi.   Abdominal:      General: Bowel sounds are normal. There is no distension.      Palpations: Abdomen is soft.      Tenderness: There is no abdominal tenderness.   Musculoskeletal:         General: Swelling (BLE trace pitting) and tenderness present. Normal range of motion.      Cervical back: Normal range of motion and neck supple. No tenderness.   Lymphadenopathy:      Cervical: No cervical adenopathy.   Skin:     General: Skin is warm and dry.      Coloration: Skin is not jaundiced.      Findings: No erythema or rash. "   Neurological:      General: No focal deficit present.      Mental Status: He is alert and oriented to person, place, and time. Mental status is at baseline.      Sensory: Sensory deficit (B hands/forearms numbness/tingling) present.      Motor: Weakness (generalized, improving) present.      Gait: Gait abnormal (stiffness of BLE, ambulation improving).   Psychiatric:         Mood and Affect: Mood normal.         Behavior: Behavior normal.         Thought Content: Thought content normal.         Judgment: Judgment normal.          Result Review :     No visits with results within 7 Day(s) from this visit.   Latest known visit with results is:   Orders Only on 04/05/2024   Component Date Value Ref Range Status    WBC 04/08/2024 1.85 (C)  3.40 - 10.80 10*3/mm3 Final    RBC 04/08/2024 3.92 (L)  4.14 - 5.80 10*6/mm3 Final    Hemoglobin 04/08/2024 11.2 (L)  13.0 - 17.7 g/dL Final    Hematocrit 04/08/2024 33.5 (L)  37.5 - 51.0 % Final    MCV 04/08/2024 85.5  79.0 - 97.0 fL Final    MCH 04/08/2024 28.6  26.6 - 33.0 pg Final    MCHC 04/08/2024 33.4  31.5 - 35.7 g/dL Final    RDW 04/08/2024 13.7  12.3 - 15.4 % Final    RDW-SD 04/08/2024 42.3  37.0 - 54.0 fl Final    MPV 04/08/2024 10.4  6.0 - 12.0 fL Final    Platelets 04/08/2024 135 (L)  140 - 450 10*3/mm3 Final    Glucose 04/08/2024 101 (H)  65 - 99 mg/dL Final    BUN 04/08/2024 6  6 - 20 mg/dL Final    Creatinine 04/08/2024 0.63 (L)  0.76 - 1.27 mg/dL Final    Sodium 04/08/2024 139  136 - 145 mmol/L Final    Potassium 04/08/2024 4.4  3.5 - 5.2 mmol/L Final    Chloride 04/08/2024 105  98 - 107 mmol/L Final    CO2 04/08/2024 27.1  22.0 - 29.0 mmol/L Final    Calcium 04/08/2024 8.5 (L)  8.6 - 10.5 mg/dL Final    Total Protein 04/08/2024 6.5  6.0 - 8.5 g/dL Final    Albumin 04/08/2024 2.9 (L)  3.5 - 5.2 g/dL Final    ALT (SGPT) 04/08/2024 6  1 - 41 U/L Final    AST (SGOT) 04/08/2024 22  1 - 40 U/L Final    Alkaline Phosphatase 04/08/2024 177 (H)  39 - 117 U/L Final    Total  Bilirubin 04/08/2024 0.7  0.0 - 1.2 mg/dL Final    Globulin 04/08/2024 3.6  gm/dL Final    A/G Ratio 04/08/2024 0.8  g/dL Final    BUN/Creatinine Ratio 04/08/2024 9.5  7.0 - 25.0 Final    Anion Gap 04/08/2024 6.9  5.0 - 15.0 mmol/L Final    eGFR 04/08/2024 119.5  >60.0 mL/min/1.73 Final    Total Cholesterol 04/08/2024 127  0 - 200 mg/dL Final    Triglycerides 04/08/2024 66  0 - 150 mg/dL Final    HDL Cholesterol 04/08/2024 40  40 - 60 mg/dL Final    LDL Cholesterol  04/08/2024 73  0 - 100 mg/dL Final    VLDL Cholesterol 04/08/2024 14  5 - 40 mg/dL Final    LDL/HDL Ratio 04/08/2024 1.85   Final    TSH 04/08/2024 2.530  0.270 - 4.200 uIU/mL Final                  Class 2 Severe Obesity (BMI >=35 and <=39.9). Obesity-related health conditions include the following: dyslipidemias and osteoarthritis. Obesity is improving with lifestyle modifications. BMI is is above average; BMI management plan is completed. We discussed portion control and increasing exercise.           Assessment and Plan    Diagnoses and all orders for this visit:    1. Bilateral edema of lower extremity (Primary)    2. DEGROOT (nonalcoholic steatohepatitis)    3. Myalgia    4. Eczema, unspecified type  Comments:  rec cetaphil restoraderm otc  shower once daily, warm water but not hot  try clobetasol  start medrol    5. B12 deficiency    6. Thrombocytopenia    7. Neutropenia, unspecified type    8. Hypoalbuminemia    Other orders  -     clobetasol propionate (TEMOVATE) 0.05 % cream; Apply 1 Application topically to the appropriate area as directed 2 (Two) Times a Day.  Dispense: 30 g; Refill: 2  -     methylPREDNISolone (MEDROL) 4 MG dose pack; Take as directed on package instructions.  Dispense: 21 tablet; Refill: 0  -     spironolactone (ALDACTONE) 50 MG tablet; Take 1 tablet by mouth Daily As Needed (swelling).  Dispense: 90 tablet; Refill: 1      F/u Dr. Greenwood as directed   C-scope monday  Decrease sodium to <2000mg/day, increase  spironolactone  Increase protein to 40mg   Cont PT  Goal 10k steps by June   F/u gastro as directed       I spent 45 minutes caring for Farhad Khan on this date of service. This time includes time spent by me in the following activities: preparing for the visit, reviewing tests, performing a medically appropriate examination and/or evaluation , counseling and educating the patient/family/caregiver, ordering medications, tests, or procedures and documenting information in the medical record        Follow Up     Return in about 3 months (around 7/15/2024) for Recheck.  Patient was given instructions and counseling regarding his condition or for health maintenance advice. Please see specific information pulled into the AVS if appropriate.        Part of this note may be an electronic transcription/translation of spoken language to printed text using the Dragon Dictation System

## 2024-04-22 ENCOUNTER — OFFICE (AMBULATORY)
Dept: URBAN - METROPOLITAN AREA PATHOLOGY 19 | Facility: PATHOLOGY | Age: 44
End: 2024-04-22
Payer: COMMERCIAL

## 2024-04-22 ENCOUNTER — ON CAMPUS - OUTPATIENT (AMBULATORY)
Dept: URBAN - METROPOLITAN AREA HOSPITAL 2 | Facility: HOSPITAL | Age: 44
End: 2024-04-22
Payer: COMMERCIAL

## 2024-04-22 VITALS
RESPIRATION RATE: 15 BRPM | DIASTOLIC BLOOD PRESSURE: 82 MMHG | DIASTOLIC BLOOD PRESSURE: 79 MMHG | HEART RATE: 78 BPM | DIASTOLIC BLOOD PRESSURE: 52 MMHG | DIASTOLIC BLOOD PRESSURE: 55 MMHG | TEMPERATURE: 98.2 F | SYSTOLIC BLOOD PRESSURE: 94 MMHG | DIASTOLIC BLOOD PRESSURE: 49 MMHG | RESPIRATION RATE: 14 BRPM | RESPIRATION RATE: 16 BRPM | OXYGEN SATURATION: 98 % | SYSTOLIC BLOOD PRESSURE: 92 MMHG | RESPIRATION RATE: 18 BRPM | SYSTOLIC BLOOD PRESSURE: 107 MMHG | HEART RATE: 75 BPM | DIASTOLIC BLOOD PRESSURE: 62 MMHG | SYSTOLIC BLOOD PRESSURE: 134 MMHG | HEIGHT: 64 IN | DIASTOLIC BLOOD PRESSURE: 61 MMHG | OXYGEN SATURATION: 99 % | SYSTOLIC BLOOD PRESSURE: 133 MMHG | HEART RATE: 90 BPM | HEART RATE: 87 BPM | SYSTOLIC BLOOD PRESSURE: 106 MMHG | HEART RATE: 83 BPM | WEIGHT: 206 LBS | OXYGEN SATURATION: 100 % | HEART RATE: 94 BPM

## 2024-04-22 DIAGNOSIS — D12.3 BENIGN NEOPLASM OF TRANSVERSE COLON: ICD-10-CM

## 2024-04-22 DIAGNOSIS — Z86.010 PERSONAL HISTORY OF COLONIC POLYPS: ICD-10-CM

## 2024-04-22 DIAGNOSIS — Z09 ENCOUNTER FOR FOLLOW-UP EXAMINATION AFTER COMPLETED TREATMEN: ICD-10-CM

## 2024-04-22 LAB
GI HISTOLOGY: A. TRANSVERSE COLON: (no result)
GI HISTOLOGY: PDF REPORT: (no result)

## 2024-04-22 PROCEDURE — 45385 COLONOSCOPY W/LESION REMOVAL: CPT | Mod: 33 | Performed by: INTERNAL MEDICINE

## 2024-04-22 PROCEDURE — 88305 TISSUE EXAM BY PATHOLOGIST: CPT | Performed by: PATHOLOGY

## 2024-05-06 ENCOUNTER — LAB (OUTPATIENT)
Dept: LAB | Facility: HOSPITAL | Age: 44
End: 2024-05-06
Payer: COMMERCIAL

## 2024-05-06 ENCOUNTER — OFFICE VISIT (OUTPATIENT)
Dept: ONCOLOGY | Facility: CLINIC | Age: 44
End: 2024-05-06
Payer: COMMERCIAL

## 2024-05-06 VITALS
OXYGEN SATURATION: 99 % | TEMPERATURE: 98.2 F | HEART RATE: 77 BPM | HEIGHT: 64 IN | SYSTOLIC BLOOD PRESSURE: 124 MMHG | DIASTOLIC BLOOD PRESSURE: 78 MMHG | BODY MASS INDEX: 36.88 KG/M2 | WEIGHT: 216 LBS

## 2024-05-06 DIAGNOSIS — D69.6 THROMBOCYTOPENIA: Primary | ICD-10-CM

## 2024-05-06 LAB
ALBUMIN SERPL-MCNC: 3.5 G/DL (ref 3.5–5.2)
ALBUMIN/GLOB SERPL: 1 G/DL
ALP SERPL-CCNC: 145 U/L (ref 39–117)
ALT SERPL W P-5'-P-CCNC: 9 U/L (ref 1–41)
ANION GAP SERPL CALCULATED.3IONS-SCNC: 10 MMOL/L (ref 5–15)
AST SERPL-CCNC: 21 U/L (ref 1–40)
BASOPHILS # BLD AUTO: 0.01 10*3/MM3 (ref 0–0.2)
BASOPHILS NFR BLD AUTO: 0.5 % (ref 0–1.5)
BILIRUB SERPL-MCNC: 0.4 MG/DL (ref 0–1.2)
BUN SERPL-MCNC: 9 MG/DL (ref 6–20)
BUN/CREAT SERPL: 14.3 (ref 7–25)
CALCIUM SPEC-SCNC: 8.7 MG/DL (ref 8.6–10.5)
CHLORIDE SERPL-SCNC: 100 MMOL/L (ref 98–107)
CO2 SERPL-SCNC: 26 MMOL/L (ref 22–29)
CREAT SERPL-MCNC: 0.63 MG/DL (ref 0.76–1.27)
DEPRECATED RDW RBC AUTO: 43.7 FL (ref 37–54)
EGFRCR SERPLBLD CKD-EPI 2021: 119.5 ML/MIN/1.73
EOSINOPHIL # BLD AUTO: 0 10*3/MM3 (ref 0–0.4)
EOSINOPHIL NFR BLD AUTO: 0 % (ref 0.3–6.2)
ERYTHROCYTE [DISTWIDTH] IN BLOOD BY AUTOMATED COUNT: 14.2 % (ref 12.3–15.4)
GLOBULIN UR ELPH-MCNC: 3.6 GM/DL
GLUCOSE SERPL-MCNC: 74 MG/DL (ref 65–99)
HCT VFR BLD AUTO: 35.9 % (ref 37.5–51)
HGB BLD-MCNC: 11.9 G/DL (ref 13–17.7)
HOLD SPECIMEN: NORMAL
HOLD SPECIMEN: NORMAL
LYMPHOCYTES # BLD AUTO: 0.8 10*3/MM3 (ref 0.7–3.1)
LYMPHOCYTES NFR BLD AUTO: 41.5 % (ref 19.6–45.3)
MCH RBC QN AUTO: 28.5 PG (ref 26.6–33)
MCHC RBC AUTO-ENTMCNC: 33.1 G/DL (ref 31.5–35.7)
MCV RBC AUTO: 85.9 FL (ref 79–97)
MONOCYTES # BLD AUTO: 0.26 10*3/MM3 (ref 0.1–0.9)
MONOCYTES NFR BLD AUTO: 13.5 % (ref 5–12)
NEUTROPHILS NFR BLD AUTO: 0.86 10*3/MM3 (ref 1.7–7)
NEUTROPHILS NFR BLD AUTO: 44.5 % (ref 42.7–76)
PLATELET # BLD AUTO: 120 10*3/MM3 (ref 140–450)
PMV BLD AUTO: 9.1 FL (ref 6–12)
POTASSIUM SERPL-SCNC: 4.3 MMOL/L (ref 3.5–5.2)
PROT SERPL-MCNC: 7.1 G/DL (ref 6–8.5)
RBC # BLD AUTO: 4.18 10*6/MM3 (ref 4.14–5.8)
SODIUM SERPL-SCNC: 136 MMOL/L (ref 136–145)
VIT B12 BLD-MCNC: 658 PG/ML (ref 211–946)
WBC NRBC COR # BLD AUTO: 1.93 10*3/MM3 (ref 3.4–10.8)

## 2024-05-06 PROCEDURE — 99213 OFFICE O/P EST LOW 20 MIN: CPT | Performed by: INTERNAL MEDICINE

## 2024-05-06 PROCEDURE — 82728 ASSAY OF FERRITIN: CPT | Performed by: INTERNAL MEDICINE

## 2024-05-06 PROCEDURE — 36415 COLL VENOUS BLD VENIPUNCTURE: CPT

## 2024-05-06 PROCEDURE — 82607 VITAMIN B-12: CPT | Performed by: INTERNAL MEDICINE

## 2024-05-06 PROCEDURE — 80053 COMPREHEN METABOLIC PANEL: CPT | Performed by: INTERNAL MEDICINE

## 2024-05-06 PROCEDURE — 85025 COMPLETE CBC W/AUTO DIFF WBC: CPT

## 2024-05-06 PROCEDURE — 83540 ASSAY OF IRON: CPT | Performed by: INTERNAL MEDICINE

## 2024-05-06 PROCEDURE — 84466 ASSAY OF TRANSFERRIN: CPT | Performed by: INTERNAL MEDICINE

## 2024-05-06 PROCEDURE — 82746 ASSAY OF FOLIC ACID SERUM: CPT | Performed by: INTERNAL MEDICINE

## 2024-05-06 RX ORDER — HYDROXYZINE HYDROCHLORIDE 25 MG/1
25 TABLET, FILM COATED ORAL 3 TIMES DAILY PRN
Qty: 45 TABLET | Refills: 0 | Status: SHIPPED | OUTPATIENT
Start: 2024-05-06

## 2024-05-07 LAB
FERRITIN SERPL-MCNC: 20.7 NG/ML (ref 30–400)
FOLATE SERPL-MCNC: 6.71 NG/ML (ref 4.78–24.2)
IRON 24H UR-MRATE: 37 MCG/DL (ref 59–158)
IRON SATN MFR SERPL: 11 % (ref 20–50)
TIBC SERPL-MCNC: 332 MCG/DL (ref 298–536)
TRANSFERRIN SERPL-MCNC: 223 MG/DL (ref 200–360)

## 2024-05-13 ENCOUNTER — LAB (OUTPATIENT)
Dept: LAB | Facility: HOSPITAL | Age: 44
End: 2024-05-13
Payer: COMMERCIAL

## 2024-05-13 DIAGNOSIS — D69.6 THROMBOCYTOPENIA: ICD-10-CM

## 2024-05-13 LAB
BASOPHILS # BLD AUTO: 0 10*3/MM3 (ref 0–0.2)
BASOPHILS NFR BLD AUTO: 0 % (ref 0–1.5)
DEPRECATED RDW RBC AUTO: 43.9 FL (ref 37–54)
EOSINOPHIL # BLD AUTO: 0 10*3/MM3 (ref 0–0.4)
EOSINOPHIL NFR BLD AUTO: 0 % (ref 0.3–6.2)
ERYTHROCYTE [DISTWIDTH] IN BLOOD BY AUTOMATED COUNT: 14.3 % (ref 12.3–15.4)
HCT VFR BLD AUTO: 34.5 % (ref 37.5–51)
HGB BLD-MCNC: 11.2 G/DL (ref 13–17.7)
LYMPHOCYTES # BLD AUTO: 0.68 10*3/MM3 (ref 0.7–3.1)
LYMPHOCYTES NFR BLD AUTO: 42.8 % (ref 19.6–45.3)
MCH RBC QN AUTO: 27.8 PG (ref 26.6–33)
MCHC RBC AUTO-ENTMCNC: 32.5 G/DL (ref 31.5–35.7)
MCV RBC AUTO: 85.6 FL (ref 79–97)
MONOCYTES # BLD AUTO: 0.2 10*3/MM3 (ref 0.1–0.9)
MONOCYTES NFR BLD AUTO: 12.6 % (ref 5–12)
NEUTROPHILS NFR BLD AUTO: 0.71 10*3/MM3 (ref 1.7–7)
NEUTROPHILS NFR BLD AUTO: 44.6 % (ref 42.7–76)
PLATELET # BLD AUTO: 110 10*3/MM3 (ref 140–450)
PMV BLD AUTO: 9.6 FL (ref 6–12)
RBC # BLD AUTO: 4.03 10*6/MM3 (ref 4.14–5.8)
WBC NRBC COR # BLD AUTO: 1.59 10*3/MM3 (ref 3.4–10.8)

## 2024-05-13 PROCEDURE — 36415 COLL VENOUS BLD VENIPUNCTURE: CPT

## 2024-05-13 PROCEDURE — 85025 COMPLETE CBC W/AUTO DIFF WBC: CPT

## 2024-05-20 ENCOUNTER — LAB (OUTPATIENT)
Dept: LAB | Facility: HOSPITAL | Age: 44
End: 2024-05-20
Payer: COMMERCIAL

## 2024-05-20 DIAGNOSIS — D69.6 THROMBOCYTOPENIA: ICD-10-CM

## 2024-05-20 LAB
BASOPHILS # BLD AUTO: 0.01 10*3/MM3 (ref 0–0.2)
BASOPHILS NFR BLD AUTO: 0.7 % (ref 0–1.5)
DEPRECATED RDW RBC AUTO: 44.7 FL (ref 37–54)
EOSINOPHIL # BLD AUTO: 0.01 10*3/MM3 (ref 0–0.4)
EOSINOPHIL NFR BLD AUTO: 0.7 % (ref 0.3–6.2)
ERYTHROCYTE [DISTWIDTH] IN BLOOD BY AUTOMATED COUNT: 14.5 % (ref 12.3–15.4)
HCT VFR BLD AUTO: 34.4 % (ref 37.5–51)
HGB BLD-MCNC: 10.9 G/DL (ref 13–17.7)
LYMPHOCYTES # BLD AUTO: 0.61 10*3/MM3 (ref 0.7–3.1)
LYMPHOCYTES NFR BLD AUTO: 45.2 % (ref 19.6–45.3)
MCH RBC QN AUTO: 27.4 PG (ref 26.6–33)
MCHC RBC AUTO-ENTMCNC: 31.7 G/DL (ref 31.5–35.7)
MCV RBC AUTO: 86.4 FL (ref 79–97)
MONOCYTES # BLD AUTO: 0.17 10*3/MM3 (ref 0.1–0.9)
MONOCYTES NFR BLD AUTO: 12.6 % (ref 5–12)
NEUTROPHILS NFR BLD AUTO: 0.55 10*3/MM3 (ref 1.7–7)
NEUTROPHILS NFR BLD AUTO: 40.8 % (ref 42.7–76)
PLATELET # BLD AUTO: 123 10*3/MM3 (ref 140–450)
PMV BLD AUTO: 9.7 FL (ref 6–12)
RBC # BLD AUTO: 3.98 10*6/MM3 (ref 4.14–5.8)
WBC NRBC COR # BLD AUTO: 1.35 10*3/MM3 (ref 3.4–10.8)

## 2024-05-20 PROCEDURE — 36415 COLL VENOUS BLD VENIPUNCTURE: CPT

## 2024-05-20 PROCEDURE — 85025 COMPLETE CBC W/AUTO DIFF WBC: CPT

## 2024-05-21 ENCOUNTER — TELEPHONE (OUTPATIENT)
Dept: ONCOLOGY | Facility: CLINIC | Age: 44
End: 2024-05-21
Payer: COMMERCIAL

## 2024-05-21 DIAGNOSIS — D69.6 THROMBOCYTOPENIA: Primary | ICD-10-CM

## 2024-05-21 NOTE — TELEPHONE ENCOUNTER
After speaking with Dr. Greenwood, I contacted the patient. I informed Mr. Khan, per Dr. Greenwood, since he is experiencing weight gain/swelling due to fluid retention since stopping spironolactone, he advises he restart taking the medication as previously prescribed. Patient voiced understanding. I stated that Dr. Greenwood stated he would like for him to come into the office tomorrow to have another CBC drawn due to his WBC being abnormal. Patient confirmed and requested the earlier appt available. Lab appt scheduled for tomorrow 5/22/24 at 0805; patient confirmed. I advised the patient to contact our office if he has any further questions or concerns. He confirmed.

## 2024-05-21 NOTE — TELEPHONE ENCOUNTER
"  Caller: Farhad Khan \"Pedro\"    Relationship: Self    Best call back number: [phone]     What is the best time to reach you: ANYTIME    Who are you requesting to speak with (clinical staff, provider,  specific staff member): CLINICAL    What was the call regarding: PT HAS BEEN OFF OF WATER PILL FOR 2 WEEKS. PT HAS PUT ON 10 LBS OF WATER WEIGHT. PT IS STARTING TO HAVE MORE ISSUES WITH LEGS DUE TO THIS. PT STATES THAT HIS WBC AND HEMOGLOBIN AND RBC ARE CONTINUING TO BE LOW. THE PT IS WANTING TO SEE IF HE CAN GO BACK ON THE WATER PILL SINCE THEY ARE CONTINUING TO TREND DOWNWARD.     IS THERE ANYTHING ELSE THE PT CAN DO? PT IS ALREADY WEARING COMPRESSION HOSE/SOCKS AND SWELLING STILL IS NOT GOING DOWN MUCH IN THE LEGS. SHOULD THE PT COME BACK IN FOR AN APPT?    Is it okay if the provider responds through MyChart: YES    "

## 2024-05-22 ENCOUNTER — LAB (OUTPATIENT)
Dept: LAB | Facility: HOSPITAL | Age: 44
End: 2024-05-22
Payer: COMMERCIAL

## 2024-05-22 DIAGNOSIS — D69.6 THROMBOCYTOPENIA: ICD-10-CM

## 2024-05-22 LAB
BASOPHILS # BLD AUTO: 0 10*3/MM3 (ref 0–0.2)
BASOPHILS NFR BLD AUTO: 0 % (ref 0–1.5)
DEPRECATED RDW RBC AUTO: 43.2 FL (ref 37–54)
EOSINOPHIL # BLD AUTO: 0 10*3/MM3 (ref 0–0.4)
EOSINOPHIL NFR BLD AUTO: 0 % (ref 0.3–6.2)
ERYTHROCYTE [DISTWIDTH] IN BLOOD BY AUTOMATED COUNT: 14.2 % (ref 12.3–15.4)
HCT VFR BLD AUTO: 34.2 % (ref 37.5–51)
HGB BLD-MCNC: 10.9 G/DL (ref 13–17.7)
LYMPHOCYTES # BLD AUTO: 0.62 10*3/MM3 (ref 0.7–3.1)
LYMPHOCYTES NFR BLD AUTO: 40 % (ref 19.6–45.3)
MCH RBC QN AUTO: 27.3 PG (ref 26.6–33)
MCHC RBC AUTO-ENTMCNC: 31.9 G/DL (ref 31.5–35.7)
MCV RBC AUTO: 85.7 FL (ref 79–97)
MONOCYTES # BLD AUTO: 0.25 10*3/MM3 (ref 0.1–0.9)
MONOCYTES NFR BLD AUTO: 16.1 % (ref 5–12)
NEUTROPHILS NFR BLD AUTO: 0.68 10*3/MM3 (ref 1.7–7)
NEUTROPHILS NFR BLD AUTO: 43.9 % (ref 42.7–76)
PLATELET # BLD AUTO: 126 10*3/MM3 (ref 140–450)
PMV BLD AUTO: 9.5 FL (ref 6–12)
RBC # BLD AUTO: 3.99 10*6/MM3 (ref 4.14–5.8)
WBC NRBC COR # BLD AUTO: 1.55 10*3/MM3 (ref 3.4–10.8)

## 2024-05-22 PROCEDURE — 36415 COLL VENOUS BLD VENIPUNCTURE: CPT

## 2024-05-22 PROCEDURE — 85025 COMPLETE CBC W/AUTO DIFF WBC: CPT

## 2024-05-25 DIAGNOSIS — R20.0 NUMBNESS AND TINGLING IN BOTH HANDS: ICD-10-CM

## 2024-05-25 DIAGNOSIS — Z90.49 S/P APPENDECTOMY: ICD-10-CM

## 2024-05-25 DIAGNOSIS — R20.2 NUMBNESS AND TINGLING IN BOTH HANDS: ICD-10-CM

## 2024-05-28 ENCOUNTER — LAB (OUTPATIENT)
Dept: LAB | Facility: HOSPITAL | Age: 44
End: 2024-05-28
Payer: COMMERCIAL

## 2024-05-28 DIAGNOSIS — D69.6 THROMBOCYTOPENIA: ICD-10-CM

## 2024-05-28 LAB
BASOPHILS # BLD AUTO: 0 10*3/MM3 (ref 0–0.2)
BASOPHILS NFR BLD AUTO: 0 % (ref 0–1.5)
DEPRECATED RDW RBC AUTO: 41.4 FL (ref 37–54)
EOSINOPHIL # BLD AUTO: 0.01 10*3/MM3 (ref 0–0.4)
EOSINOPHIL NFR BLD AUTO: 0.6 % (ref 0.3–6.2)
ERYTHROCYTE [DISTWIDTH] IN BLOOD BY AUTOMATED COUNT: 14 % (ref 12.3–15.4)
HCT VFR BLD AUTO: 35.1 % (ref 37.5–51)
HGB BLD-MCNC: 11.5 G/DL (ref 13–17.7)
LYMPHOCYTES # BLD AUTO: 0.63 10*3/MM3 (ref 0.7–3.1)
LYMPHOCYTES NFR BLD AUTO: 36.6 % (ref 19.6–45.3)
MCH RBC QN AUTO: 27.4 PG (ref 26.6–33)
MCHC RBC AUTO-ENTMCNC: 32.8 G/DL (ref 31.5–35.7)
MCV RBC AUTO: 83.6 FL (ref 79–97)
MONOCYTES # BLD AUTO: 0.26 10*3/MM3 (ref 0.1–0.9)
MONOCYTES NFR BLD AUTO: 15.1 % (ref 5–12)
NEUTROPHILS NFR BLD AUTO: 0.82 10*3/MM3 (ref 1.7–7)
NEUTROPHILS NFR BLD AUTO: 47.7 % (ref 42.7–76)
PLATELET # BLD AUTO: 128 10*3/MM3 (ref 140–450)
PMV BLD AUTO: 8.8 FL (ref 6–12)
RBC # BLD AUTO: 4.2 10*6/MM3 (ref 4.14–5.8)
WBC NRBC COR # BLD AUTO: 1.72 10*3/MM3 (ref 3.4–10.8)

## 2024-05-28 PROCEDURE — 85025 COMPLETE CBC W/AUTO DIFF WBC: CPT

## 2024-05-28 PROCEDURE — 36415 COLL VENOUS BLD VENIPUNCTURE: CPT

## 2024-05-28 RX ORDER — HYDROCODONE BITARTRATE AND ACETAMINOPHEN 5; 325 MG/1; MG/1
1 TABLET ORAL EVERY 6 HOURS PRN
Qty: 60 TABLET | Refills: 0 | Status: SHIPPED | OUTPATIENT
Start: 2024-05-28

## 2024-06-03 ENCOUNTER — LAB (OUTPATIENT)
Dept: LAB | Facility: HOSPITAL | Age: 44
End: 2024-06-03
Payer: COMMERCIAL

## 2024-06-03 DIAGNOSIS — D69.6 THROMBOCYTOPENIA: ICD-10-CM

## 2024-06-03 LAB
BASOPHILS # BLD AUTO: 0.01 10*3/MM3 (ref 0–0.2)
BASOPHILS NFR BLD AUTO: 0.6 % (ref 0–1.5)
DEPRECATED RDW RBC AUTO: 42.4 FL (ref 37–54)
EOSINOPHIL # BLD AUTO: 0 10*3/MM3 (ref 0–0.4)
EOSINOPHIL NFR BLD AUTO: 0 % (ref 0.3–6.2)
ERYTHROCYTE [DISTWIDTH] IN BLOOD BY AUTOMATED COUNT: 14.2 % (ref 12.3–15.4)
HCT VFR BLD AUTO: 36.7 % (ref 37.5–51)
HGB BLD-MCNC: 11.7 G/DL (ref 13–17.7)
LYMPHOCYTES # BLD AUTO: 0.76 10*3/MM3 (ref 0.7–3.1)
LYMPHOCYTES NFR BLD AUTO: 43.7 % (ref 19.6–45.3)
MCH RBC QN AUTO: 26.7 PG (ref 26.6–33)
MCHC RBC AUTO-ENTMCNC: 31.9 G/DL (ref 31.5–35.7)
MCV RBC AUTO: 83.8 FL (ref 79–97)
MONOCYTES # BLD AUTO: 0.23 10*3/MM3 (ref 0.1–0.9)
MONOCYTES NFR BLD AUTO: 13.2 % (ref 5–12)
NEUTROPHILS NFR BLD AUTO: 0.74 10*3/MM3 (ref 1.7–7)
NEUTROPHILS NFR BLD AUTO: 42.5 % (ref 42.7–76)
PLATELET # BLD AUTO: 133 10*3/MM3 (ref 140–450)
PMV BLD AUTO: 9.3 FL (ref 6–12)
RBC # BLD AUTO: 4.38 10*6/MM3 (ref 4.14–5.8)
WBC NRBC COR # BLD AUTO: 1.74 10*3/MM3 (ref 3.4–10.8)

## 2024-06-03 PROCEDURE — 36415 COLL VENOUS BLD VENIPUNCTURE: CPT

## 2024-06-03 PROCEDURE — 85025 COMPLETE CBC W/AUTO DIFF WBC: CPT

## 2024-06-06 RX ORDER — HYDROXYZINE HYDROCHLORIDE 25 MG/1
25 TABLET, FILM COATED ORAL 3 TIMES DAILY PRN
Qty: 90 TABLET | Refills: 2 | Status: SHIPPED | OUTPATIENT
Start: 2024-06-06

## 2024-06-18 NOTE — PROGRESS NOTES
HEMATOLOGY ONCOLOGY OUTPATIENT FOLLOW-UP      Patient name: Farhad Khan  : 1980  MRN: [id number]  Primary Care Physician: Jacqueline Dwyer APRN  Referring Physician: No ref. provider found  Reason For Consult:     Chief Complaint   Patient presents with    Follow-up     Thrombocytopenia     HPI:   History of Present Illness:  Farhad Khan is 43 y.o. male who presented to the office on 09/15/22 for consultation regarding    9/15/2022: Mr. Khan was referred for the investigation of leukopenia, neutropenia and thrombocytopenia.  He was first noted to have moderate leukopenia approximately 1 year before this visit.  Over the course of several months the leukopenia became somewhat worse and at some point his total white cells were less than 2.0.  Approximately 3 weeks before this visit this had improved and the total white cell count was up to 2.4 and this was associated to moderate neutropenia.  On this basis he was referred.  He had no new symptoms.  For some time, perhaps as many as 3 years, he had been experiencing fatigue and general malaise.  He had not had any changes in his work and he was able to fulfill all his duties.  He had been afebrile and without weight loss.  For at least 2 or 3 years he had maintained a similar weight.  He had been without chest pains or cough.  And denied expectoration or hemoptysis.  No dysphagia.  He had had no abdominal pain and denied diarrhea or dysuria.  He had not experienced any peripheral edema.  No skin rash.  For approximately 2 years he has been taking a combination of medications that included lisinopril, omeprazole and rosuvastatin.    2022: In the office to review the results of his tests.  Felt reasonably well and perhaps better than the previous week he had been much more fatigued at that time.  Eating well.  Reported frequent diarrhea but only intermittently.  Denied fevers.  Had had no chest pains and no increasing  dyspnea.  No abdominal pain at the time but did admit to intermittent right upper quadrant pain.  No edema.  The met laboratory exams were essentially unremarkable though they did confirm leukopenia, neutropenia and thrombocytopenia.  No suggestion of a bone marrow disorder, howeve.  The ultrasound of the abdomen confirmed the presence of a liver with cirrhotic morphology and splenomegaly.  It was felt that the explanation for his cytopenias was this splenomegaly.    11/18/2022: Feeling reasonably well. Continued to work and described being more active than before. Eating well and stable weight, though his wife described that he did not eat enough to justify his weight. Seen by the nurse practitioner at Dr. Knight's office. He was offered a clinical trial but did not offer anything else. The physical exam was unchanged. He persisted with moderate leukopenia and neutropenia, as well as thrombocytopenia. They had not changed and were not associated to any other problems. A decision was made to continue to follow.     5/19/2023: Without new symptoms.  Was seen at the gastroenterologist office and was placed on a trial testing, and on 2 drugs, semaglutide for the treatment of steatohepatitis.  Had lost approximately 12 pounds and since the commencement of the study and was experiencing some nausea as well as a reduction in his appetite.  He had been afebrile.  The exam disclosed no changes.  A dentulous, without any oral ulcers.  No palpable lymphadenopathy.  Abdomen protuberant.  Difficult to tell if the liver or spleen were enlarged as before.  No peripheral edema.  The white cell count had decreased to 1860/mm³, with neutrophils, as well lower than 1000/mm³ at 860/mm³.  The platelet count remained in the same range as before at 118,000/mm³.  A long discussion was had with him in regards to prevention of infections and neutropenic fever.  He was also asked to call immediately if he should have a fever.  Antinuclear  antibodies and complement were requested and he was asked to return in 3 weeks.  Also requested information on the clinical trial to see what other drug he could be receiving.     6/9/2023: Feeling well and without new symptoms.  Continues to participate in a clinical trial and had some modifications to his doses.  He continues to lose weight and has been consistently losing approximately 5 pounds per week.  No appetite.  Frequently nauseated but no diarrhea.  Afebrile.  No chest pains or cough.  On exam no changes.  The laboratory exams were reviewed.  The neutropenia had resolved.  RADHA and complement unremarkable.  Discussed with him.    9/29/2023: Not feeling well.  Nauseated.  Unable to eat.  Losing weight rapidly.  This started with an increase in the dose of the study medications that he has been receiving for the treatment of his steatohepatitis.  A few days ago he received some intravenous fluids and the dose of the medications was reduced.  However, in spite of that, he has persisted with the same symptoms.  He has been afebrile.  On exam he is well-hydrated.  Has lost a large amount of weight.  No jaundice.  The lungs are clear.  The heart is regular.  The abdomen is soft.  Liver and spleen are nonenlarged.  No edema.  The laboratory exams reported a white blood cell count of 2640/mm³.  He had minor absolute lymphocytopenia, however, his neutrophil count was well within the normal range.  Hemoglobin normal at 16.7 g/dL with normocytic red cells and platelets improved at 131,000/mm³.  A decision was made to continue to observe without intervention.  A discussion was had with him about his symptoms.  He had been tempted to discontinue the study medications.  Given that he has not been able to eat and that he is constantly vomiting it probably would be reasonable to stop.    2/5/2024: Much better.  Following the previous appointment, sometime in October 2023, he developed abdominal pain and sought attention  from the hospital.  A diagnosis of appendicitis was made.  He was transferred to [facility] in [place] and underwent a laparoscopic appendectomy without complications.  However, following discharge, he could not walk because of muscle weakness.  He has been receiving physical therapy since then and is progressively stronger, now walking.  He is off of the study protocol.  He is able to eat.  He has maintained a stable weight.  He has been diagnosed with pathology of the gallbladder and is going to receive a cholecystectomy sometime in the future.  He has had no dyspnea or chest pains.  On exam he appears to be a chronically ill man.  He does not seem in distress.  He is not jaundiced.  He is a dentulous.  There is no palpable lymphadenopathy.  The lungs are clear.  The heart is regular.  The abdomen is rounded and protuberant but soft.  There is no edema.  Laboratory exams reported a white blood cell count of 2007/mm³ with eosinopenia, and eosinophil count of 0/mm³ and moderate neutropenia with an absolute neutrophil number of 1180/mm³.  The hemoglobin and platelet count are today unremarkable.  A decision was made to continue to observe.  Will measure again B12 and folic acid and will see with results in approximately 3 months.    5/6/2024: Much better.  Able to walk and exercising regularly.  Has regained some of the weight that he had lost and is progressively stronger.  He has had no nausea or vomiting.  He has had no chest pains or cough and denies abdominal pain.  He had edema of the lower extremities and was placed on spironolactone which he has been taking every day.  On exam he appears chronically ill.  No distress.  No jaundice.  The lungs are diminished bilaterally.  The heart is regular.  The abdomen is soft but protuberant and not tender.  There is no edema.  Laboratory exams reviewed.  The white cell count is up to 1930/mm³ with slight monocytosis and persistent neutropenia.  This is a new  development and probably related to the use of spironolactone.  I have instructed him to discontinue the diuretic.  I will have his blood count checked every week and will see him in approximately 6 weeks.  He is to call if any problems arise prior to the next visit.    6/19/2024: Feeling much better. Now able to walk longer and with better equilibrium. His appetite has improved, as well and he has gained some of the weight he had lost. He has been afebrile, though his temperature today was somewhat greater than 99. He has been without chest pain. No cough. No abdominal pain and no bleeding. He has persisted with peripheral edema but the use of diuretics and compression stockings have made a difference.     The following portions of the patient's history were reviewed and updated as appropriate: allergies, current medications, past family history, past medical history, past social history, past surgical history and problem list.    Past Medical History:   Diagnosis Date    B12 deficiency     Chronic fatigue     Dizziness     Elevated LFTs     Fatty liver     NON ALCOHOLIC    GERD (gastroesophageal reflux disease)     Gout     Headache     HSV infection     Hyperglycemia     Hyperlipidemia     Hypertension     Obesity     Obstructive sleep apnea     Onychomycosis     Pain in joint, multiple sites     Sebaceous cyst     Skin nodule     OF ARM, LEFT    Snoring     Vision changes      No past surgical history on file.      Current Outpatient Medications:     clobetasol propionate (TEMOVATE) 0.05 % cream, Apply 1 Application topically to the appropriate area as directed 2 (Two) Times a Day., Disp: 30 g, Rfl: 2    Docusate Sodium (COLACE PO), Per instructions 2 TIMES DAILY (route: oral), Disp: , Rfl:     gabapentin (NEURONTIN) 400 MG capsule, TAKE 1 CAPSULE BY MOUTH 3 TIMES A DAY, Disp: 90 capsule, Rfl: 2    Gas-X Ultra Strength 180 MG capsule, , Disp: , Rfl:     HYDROcodone-acetaminophen (NORCO) 5-325 MG per tablet, Take  1 tablet by mouth Every 6 (Six) Hours As Needed for Severe Pain., Disp: 60 tablet, Rfl: 0    hydrOXYzine (ATARAX) 10 MG tablet, TAKE 1 TABLET BY MOUTH 3 TIMES A DAY AS NEEDED for itching, Disp: 45 tablet, Rfl: 0    hydrOXYzine (ATARAX) 25 MG tablet, Take 1 tablet by mouth 3 (Three) Times a Day As Needed for Itching., Disp: 90 tablet, Rfl: 2    lisinopril (PRINIVIL,ZESTRIL) 10 MG tablet, TAKE 1 TABLET BY MOUTH DAILY (Patient taking differently: Take 1 tablet by mouth Daily. As needed .), Disp: 90 tablet, Rfl: 1    methylPREDNISolone (MEDROL) 4 MG dose pack, Take as directed on package instructions., Disp: 21 tablet, Rfl: 0    omeprazole (priLOSEC) 40 MG capsule, Take 1 capsule by mouth Daily., Disp: 90 capsule, Rfl: 1    spironolactone (ALDACTONE) 50 MG tablet, Take 1 tablet by mouth Daily As Needed (swelling)., Disp: 90 tablet, Rfl: 1    pramipexole (MIRAPEX) 0.125 MG tablet, TAKE 1 TABLET BY MOUTH EVERY NIGHT FOR RESTLESS LEGS (Patient not taking: Reported on 4/15/2024), Disp: 180 tablet, Rfl: 0    Allergies   Allergen Reactions    Ozempic (0.25 Or 0.5 Mg-Dose) [Semaglutide(0.25 Or 0.5mg-Dos)] GI Intolerance    Sulfa Antibiotics Other (See Comments)     Rash, swelling, tingling, peeling.     Family History   Problem Relation Age of Onset    Sleep apnea Father     COPD Father     Other Brother         Desmoid tumor of the abdomen    Hypertension Other     Rheum arthritis Other      Cancer-related family history is not on file.    Social History     Tobacco Use    Smoking status: Never    Smokeless tobacco: Never   Vaping Use    Vaping status: Never Used   Substance Use Topics    Alcohol use: No    Drug use: No     Social History     Social History Narrative    Not on file      ROS:     Review of Systems   Constitutional:  Positive for fatigue. Negative for activity change, appetite change, chills, diaphoresis, fever and unexpected weight change.   HENT:  Negative for congestion, dental problem, drooling, ear  "discharge, ear pain, facial swelling, hearing loss, mouth sores, nosebleeds, postnasal drip, rhinorrhea, sinus pressure, sinus pain, sneezing, sore throat, tinnitus, trouble swallowing and voice change.    Eyes:  Negative for photophobia, pain, discharge, redness, itching and visual disturbance.   Respiratory:  Negative for apnea, cough, choking, chest tightness, shortness of breath, wheezing and stridor.    Cardiovascular:  Negative for chest pain, palpitations and leg swelling.   Gastrointestinal:  Positive for nausea and vomiting. Negative for abdominal distention, abdominal pain, anal bleeding, blood in stool, constipation, diarrhea and rectal pain.   Endocrine: Negative for cold intolerance, heat intolerance, polydipsia and polyuria.   Genitourinary:  Negative for decreased urine volume, difficulty urinating, dysuria, flank pain, frequency, genital sores, hematuria and urgency.   Musculoskeletal:  Negative for arthralgias, back pain, gait problem, joint swelling, myalgias, neck pain and neck stiffness.   Skin:  Negative for color change, pallor and rash.   Neurological:  Positive for weakness. Negative for dizziness, tremors, seizures, syncope, facial asymmetry, speech difficulty, light-headedness, numbness and headaches.   Hematological:  Negative for adenopathy. Does not bruise/bleed easily.   Psychiatric/Behavioral:  Negative for agitation, behavioral problems, confusion, decreased concentration, hallucinations, self-injury, sleep disturbance and suicidal ideas. The patient is not nervous/anxious.      Objective:    Vitals:    06/19/24 1420   BP: 128/79   Pulse: 87   Temp: 99.3 °F (37.4 °C)   TempSrc: Temporal   SpO2: 100%   Weight: 106 kg (233 lb 3.2 oz)   Height: 162.6 cm (64\")   PainSc: 0-No pain     Body mass index is 40.03 kg/m².  ECOG  (0) Fully active, able to carry on all predisease performance without restriction    Physical Exam:     Physical Exam  Constitutional:       General: He is not in acute " distress.     Appearance: He is ill-appearing. He is not toxic-appearing or diaphoretic.      Comments: Well-built, oriented and in no distress but seems ill.   HENT:      Head: Normocephalic and atraumatic.      Right Ear: External ear normal.      Left Ear: External ear normal.      Nose: Nose normal.      Mouth/Throat:      Mouth: Mucous membranes are moist.      Pharynx: Oropharynx is clear.      Comments: Edentulous  Eyes:      General: No scleral icterus.        Right eye: No discharge.         Left eye: No discharge.      Conjunctiva/sclera: Conjunctivae normal.      Pupils: Pupils are equal, round, and reactive to light.   Cardiovascular:      Rate and Rhythm: Normal rate and regular rhythm.      Pulses: Normal pulses.      Heart sounds: Normal heart sounds. No murmur heard.     No friction rub. No gallop.   Pulmonary:      Effort: No respiratory distress.      Breath sounds: No stridor. No wheezing, rhonchi or rales.   Chest:      Chest wall: No tenderness.   Abdominal:      General: Bowel sounds are normal. There is no distension.      Palpations: Abdomen is soft. There is no mass.      Tenderness: There is no abdominal tenderness. There is no right CVA tenderness, left CVA tenderness, guarding or rebound.      Comments: Protuberant, soft and not tender. The spleen is not palpable because of the body habitus.    Musculoskeletal:         General: No swelling, tenderness, deformity or signs of injury.      Cervical back: No rigidity.      Right lower leg: No edema.      Left lower leg: No edema.   Lymphadenopathy:      Cervical: No cervical adenopathy.   Skin:     General: Skin is warm and dry.      Coloration: Skin is not jaundiced.      Findings: No bruising or rash.   Neurological:      General: No focal deficit present.      Mental Status: He is alert and oriented to person, place, and time.      Gait: Gait normal.   Psychiatric:         Mood and Affect: Mood normal.         Behavior: Behavior normal.          Thought Content: Thought content normal.         Judgment: Judgment normal.     DEMAR Greenwood MD performed the physical exam on 6/19/2024 as documented above.     Lab Results - Last 18 Months   Lab Units 06/19/24  1421 06/03/24  0826 05/28/24  0807   WBC 10*3/mm3 1.63* 1.74* 1.72*   HEMOGLOBIN g/dL 11.4* 11.7* 11.5*   HEMATOCRIT % 35.6* 36.7* 35.1*   PLATELETS 10*3/mm3 117* 133* 128*   MCV fL 82.2 83.8 83.6     Lab Results - Last 18 Months   Lab Units 06/19/24  1421 05/06/24  0956 04/08/24  1027   SODIUM mmol/L 136 136 139   POTASSIUM mmol/L 4.0 4.3 4.4   CHLORIDE mmol/L 102 100 105   CO2 mmol/L 25.2 26.0 27.1   BUN mg/dL 7 9 6   CREATININE mg/dL 0.82 0.63* 0.63*   CALCIUM mg/dL 8.8 8.7 8.5*   BILIRUBIN mg/dL 0.7 0.4 0.7   ALK PHOS U/L 112 145* 177*   ALT (SGPT) U/L 14 9 6   AST (SGOT) U/L 28 21 22   GLUCOSE mg/dL 133* 74 101*     Lab Results   Component Value Date    GLUCOSE 133 (H) 06/19/2024    BUN 7 06/19/2024    CREATININE 0.82 06/19/2024    EGFRIFNONA 107 09/16/2021    BCR 8.5 06/19/2024    K 4.0 06/19/2024    CO2 25.2 06/19/2024    CALCIUM 8.8 06/19/2024    ALBUMIN 3.7 06/19/2024    LABIL2 1.2 11/16/2018    AST 28 06/19/2024    ALT 14 06/19/2024     Uric Acid   Date Value Ref Range Status   08/04/2022 4.1 3.4 - 7.0 mg/dL Final     Assessment & Plan     Assessment:  Leukopenia and neutropenia: Persists. Not worse but not any better either. The discontinuation of the spironolactone did not seem to make a difference. Will obtain a bone marrow biopsy and aspiration. Discussed with him at length and explained the rationale.   Thrombocytopenia: Unchanged.   Steatohepatitis and cirrhosis: Continues to follow with gastroenterology.   He will see me again in 3 weeks.     Plan:  1. As above.     Sravan Greenwood MD on 6/19/2024 at 18:27

## 2024-06-19 ENCOUNTER — LAB (OUTPATIENT)
Dept: LAB | Facility: HOSPITAL | Age: 44
End: 2024-06-19
Payer: COMMERCIAL

## 2024-06-19 ENCOUNTER — OFFICE VISIT (OUTPATIENT)
Dept: ONCOLOGY | Facility: CLINIC | Age: 44
End: 2024-06-19
Payer: COMMERCIAL

## 2024-06-19 VITALS
OXYGEN SATURATION: 100 % | WEIGHT: 233.2 LBS | BODY MASS INDEX: 39.81 KG/M2 | HEIGHT: 64 IN | SYSTOLIC BLOOD PRESSURE: 128 MMHG | TEMPERATURE: 99.3 F | DIASTOLIC BLOOD PRESSURE: 79 MMHG | HEART RATE: 87 BPM

## 2024-06-19 DIAGNOSIS — D69.6 THROMBOCYTOPENIA: Primary | ICD-10-CM

## 2024-06-19 LAB
ALBUMIN SERPL-MCNC: 3.7 G/DL (ref 3.5–5.2)
ALBUMIN/GLOB SERPL: 1.2 G/DL
ALP SERPL-CCNC: 112 U/L (ref 39–117)
ALT SERPL W P-5'-P-CCNC: 14 U/L (ref 1–41)
ANION GAP SERPL CALCULATED.3IONS-SCNC: 8.8 MMOL/L (ref 5–15)
AST SERPL-CCNC: 28 U/L (ref 1–40)
BASOPHILS # BLD AUTO: 0 10*3/MM3 (ref 0–0.2)
BASOPHILS NFR BLD AUTO: 0 % (ref 0–1.5)
BILIRUB SERPL-MCNC: 0.7 MG/DL (ref 0–1.2)
BUN SERPL-MCNC: 7 MG/DL (ref 6–20)
BUN/CREAT SERPL: 8.5 (ref 7–25)
C3 SERPL-MCNC: 88 MG/DL (ref 82–167)
C4 SERPL-MCNC: 9 MG/DL (ref 14–44)
CALCIUM SPEC-SCNC: 8.8 MG/DL (ref 8.6–10.5)
CHLORIDE SERPL-SCNC: 102 MMOL/L (ref 98–107)
CO2 SERPL-SCNC: 25.2 MMOL/L (ref 22–29)
CREAT SERPL-MCNC: 0.82 MG/DL (ref 0.76–1.27)
DEPRECATED RDW RBC AUTO: 44.7 FL (ref 37–54)
EGFRCR SERPLBLD CKD-EPI 2021: 110.4 ML/MIN/1.73
EOSINOPHIL # BLD AUTO: 0 10*3/MM3 (ref 0–0.4)
EOSINOPHIL NFR BLD AUTO: 0 % (ref 0.3–6.2)
ERYTHROCYTE [DISTWIDTH] IN BLOOD BY AUTOMATED COUNT: 15.2 % (ref 12.3–15.4)
GLOBULIN UR ELPH-MCNC: 3 GM/DL
GLUCOSE SERPL-MCNC: 133 MG/DL (ref 65–99)
HCT VFR BLD AUTO: 35.6 % (ref 37.5–51)
HGB BLD-MCNC: 11.4 G/DL (ref 13–17.7)
HOLD SPECIMEN: NORMAL
HOLD SPECIMEN: NORMAL
LYMPHOCYTES # BLD AUTO: 0.34 10*3/MM3 (ref 0.7–3.1)
LYMPHOCYTES NFR BLD AUTO: 20.9 % (ref 19.6–45.3)
MCH RBC QN AUTO: 26.3 PG (ref 26.6–33)
MCHC RBC AUTO-ENTMCNC: 32 G/DL (ref 31.5–35.7)
MCV RBC AUTO: 82.2 FL (ref 79–97)
MONOCYTES # BLD AUTO: 0.29 10*3/MM3 (ref 0.1–0.9)
MONOCYTES NFR BLD AUTO: 17.8 % (ref 5–12)
NEUTROPHILS NFR BLD AUTO: 1 10*3/MM3 (ref 1.7–7)
NEUTROPHILS NFR BLD AUTO: 61.3 % (ref 42.7–76)
PLATELET # BLD AUTO: 117 10*3/MM3 (ref 140–450)
PMV BLD AUTO: 9.7 FL (ref 6–12)
POTASSIUM SERPL-SCNC: 4 MMOL/L (ref 3.5–5.2)
PROT SERPL-MCNC: 6.7 G/DL (ref 6–8.5)
RBC # BLD AUTO: 4.33 10*6/MM3 (ref 4.14–5.8)
SODIUM SERPL-SCNC: 136 MMOL/L (ref 136–145)
WBC NRBC COR # BLD AUTO: 1.63 10*3/MM3 (ref 3.4–10.8)

## 2024-06-19 PROCEDURE — 80053 COMPREHEN METABOLIC PANEL: CPT | Performed by: INTERNAL MEDICINE

## 2024-06-19 PROCEDURE — 36415 COLL VENOUS BLD VENIPUNCTURE: CPT

## 2024-06-19 PROCEDURE — 85025 COMPLETE CBC W/AUTO DIFF WBC: CPT

## 2024-06-19 PROCEDURE — 86038 ANTINUCLEAR ANTIBODIES: CPT | Performed by: INTERNAL MEDICINE

## 2024-06-19 PROCEDURE — 86160 COMPLEMENT ANTIGEN: CPT | Performed by: INTERNAL MEDICINE

## 2024-06-21 LAB — ANA SER QL: NEGATIVE

## 2024-07-02 ENCOUNTER — HOSPITAL ENCOUNTER (OUTPATIENT)
Dept: CT IMAGING | Facility: HOSPITAL | Age: 44
Discharge: HOME | End: 2024-07-02
Admitting: RADIOLOGY
Payer: COMMERCIAL

## 2024-07-02 VITALS
OXYGEN SATURATION: 98 % | BODY MASS INDEX: 39.78 KG/M2 | TEMPERATURE: 97.7 F | HEIGHT: 64 IN | HEART RATE: 74 BPM | SYSTOLIC BLOOD PRESSURE: 101 MMHG | RESPIRATION RATE: 15 BRPM | WEIGHT: 233 LBS | DIASTOLIC BLOOD PRESSURE: 52 MMHG

## 2024-07-02 DIAGNOSIS — D69.6 THROMBOCYTOPENIA: ICD-10-CM

## 2024-07-02 LAB
APTT PPP: 31.3 SECONDS (ref 24–31)
BASOPHILS # BLD AUTO: 0 10*3/MM3 (ref 0–0.2)
BASOPHILS NFR BLD AUTO: 0 % (ref 0–1.5)
DEPRECATED RDW RBC AUTO: 43.3 FL (ref 37–54)
EOSINOPHIL # BLD AUTO: 0 10*3/MM3 (ref 0–0.4)
EOSINOPHIL NFR BLD AUTO: 0 % (ref 0.3–6.2)
ERYTHROCYTE [DISTWIDTH] IN BLOOD BY AUTOMATED COUNT: 14.9 % (ref 12.3–15.4)
HCT VFR BLD AUTO: 37 % (ref 37.5–51)
HGB BLD-MCNC: 11.8 G/DL (ref 13–17.7)
IMM GRANULOCYTES # BLD AUTO: 0.01 10*3/MM3 (ref 0–0.05)
IMM GRANULOCYTES NFR BLD AUTO: 0.5 % (ref 0–0.5)
INR PPP: 1.2 (ref 0.93–1.1)
LYMPHOCYTES # BLD AUTO: 0.73 10*3/MM3 (ref 0.7–3.1)
LYMPHOCYTES NFR BLD AUTO: 35.4 % (ref 19.6–45.3)
MCH RBC QN AUTO: 25.5 PG (ref 26.6–33)
MCHC RBC AUTO-ENTMCNC: 31.9 G/DL (ref 31.5–35.7)
MCV RBC AUTO: 80.1 FL (ref 79–97)
MONOCYTES # BLD AUTO: 0.27 10*3/MM3 (ref 0.1–0.9)
MONOCYTES NFR BLD AUTO: 13.1 % (ref 5–12)
NEUTROPHILS NFR BLD AUTO: 1.05 10*3/MM3 (ref 1.7–7)
NEUTROPHILS NFR BLD AUTO: 51 % (ref 42.7–76)
NRBC BLD AUTO-RTO: 0 /100 WBC (ref 0–0.2)
PLATELET # BLD AUTO: 138 10*3/MM3 (ref 140–450)
PMV BLD AUTO: 9.2 FL (ref 6–12)
PROTHROMBIN TIME: 12.9 SECONDS (ref 9.6–11.7)
RBC # BLD AUTO: 4.62 10*6/MM3 (ref 4.14–5.8)
WBC NRBC COR # BLD AUTO: 2.06 10*3/MM3 (ref 3.4–10.8)

## 2024-07-02 PROCEDURE — 25010000002 FENTANYL CITRATE (PF) 50 MCG/ML SOLUTION

## 2024-07-02 PROCEDURE — 25010000002 MIDAZOLAM PER 1 MG

## 2024-07-02 PROCEDURE — 77012 CT SCAN FOR NEEDLE BIOPSY: CPT

## 2024-07-02 PROCEDURE — 85025 COMPLETE CBC W/AUTO DIFF WBC: CPT | Performed by: RADIOLOGY

## 2024-07-02 PROCEDURE — 25810000003 SODIUM CHLORIDE 0.9 % SOLUTION: Performed by: RADIOLOGY

## 2024-07-02 PROCEDURE — 25010000002 LIDOCAINE 1 % SOLUTION

## 2024-07-02 PROCEDURE — 85730 THROMBOPLASTIN TIME PARTIAL: CPT | Performed by: RADIOLOGY

## 2024-07-02 PROCEDURE — 99152 MOD SED SAME PHYS/QHP 5/>YRS: CPT

## 2024-07-02 PROCEDURE — C1830 POWER BONE MARROW BX NEEDLE: HCPCS

## 2024-07-02 PROCEDURE — 85610 PROTHROMBIN TIME: CPT | Performed by: RADIOLOGY

## 2024-07-02 PROCEDURE — 25010000002 ONDANSETRON PER 1 MG

## 2024-07-02 RX ORDER — ONDANSETRON 2 MG/ML
INJECTION INTRAMUSCULAR; INTRAVENOUS AS NEEDED
Status: COMPLETED | OUTPATIENT
Start: 2024-07-02 | End: 2024-07-02

## 2024-07-02 RX ORDER — SODIUM CHLORIDE 9 MG/ML
75 INJECTION, SOLUTION INTRAVENOUS CONTINUOUS
Status: DISCONTINUED | OUTPATIENT
Start: 2024-07-02 | End: 2024-07-03 | Stop reason: HOSPADM

## 2024-07-02 RX ORDER — FENTANYL CITRATE 50 UG/ML
INJECTION, SOLUTION INTRAMUSCULAR; INTRAVENOUS AS NEEDED
Status: COMPLETED | OUTPATIENT
Start: 2024-07-02 | End: 2024-07-02

## 2024-07-02 RX ORDER — MIDAZOLAM HYDROCHLORIDE 1 MG/ML
INJECTION INTRAMUSCULAR; INTRAVENOUS AS NEEDED
Status: COMPLETED | OUTPATIENT
Start: 2024-07-02 | End: 2024-07-02

## 2024-07-02 RX ORDER — LIDOCAINE HYDROCHLORIDE 10 MG/ML
INJECTION, SOLUTION INFILTRATION; PERINEURAL AS NEEDED
Status: COMPLETED | OUTPATIENT
Start: 2024-07-02 | End: 2024-07-02

## 2024-07-02 RX ORDER — SODIUM CHLORIDE 0.9 % (FLUSH) 0.9 %
10 SYRINGE (ML) INJECTION EVERY 12 HOURS SCHEDULED
Status: DISCONTINUED | OUTPATIENT
Start: 2024-07-02 | End: 2024-07-03 | Stop reason: HOSPADM

## 2024-07-02 RX ORDER — SODIUM CHLORIDE 0.9 % (FLUSH) 0.9 %
10 SYRINGE (ML) INJECTION AS NEEDED
Status: DISCONTINUED | OUTPATIENT
Start: 2024-07-02 | End: 2024-07-03 | Stop reason: HOSPADM

## 2024-07-02 RX ADMIN — LIDOCAINE HYDROCHLORIDE 10 ML: 10 INJECTION, SOLUTION INFILTRATION; PERINEURAL at 09:31

## 2024-07-02 RX ADMIN — SODIUM CHLORIDE 75 ML/HR: 9 INJECTION, SOLUTION INTRAVENOUS at 07:56

## 2024-07-02 RX ADMIN — FENTANYL CITRATE 100 MCG: 50 INJECTION, SOLUTION INTRAMUSCULAR; INTRAVENOUS at 09:28

## 2024-07-02 RX ADMIN — MIDAZOLAM 1 MG: 1 INJECTION INTRAMUSCULAR; INTRAVENOUS at 09:21

## 2024-07-02 RX ADMIN — MIDAZOLAM 1 MG: 1 INJECTION INTRAMUSCULAR; INTRAVENOUS at 09:28

## 2024-07-02 RX ADMIN — FENTANYL CITRATE 100 MCG: 50 INJECTION, SOLUTION INTRAMUSCULAR; INTRAVENOUS at 09:21

## 2024-07-02 RX ADMIN — Medication 10 ML: at 08:03

## 2024-07-02 RX ADMIN — ONDANSETRON 4 MG: 2 INJECTION INTRAMUSCULAR; INTRAVENOUS at 09:09

## 2024-07-02 NOTE — H&P
With thrombocytopenia.[facility]   Interventional Radiology H&P    Patient Name: Farhad Khan  : 1980  MRN: [id number]  Primary Care Physician:  Jacqueline Dwyer APRN  Referring Physician: Sravan Greenwood MD  Date of admission: 2024    Subjective   Subjective     HPI:  Farhad Khan is a 43 y.o. male with thrombocytopenia.    Review of Systems:   Constitutional no fever,  no weight loss       Otolaryngeal no difficulty swallowing   Cardiovascular no chest pain   Pulmonary no cough, no sputum production   Gastrointestinal no constipation, no diarrhea                         Personal History       Past Medical/Surgical History:   Past Medical History:   Diagnosis Date    B12 deficiency     Chronic fatigue     Dizziness     Elevated LFTs     Fatty liver     NON ALCOHOLIC    GERD (gastroesophageal reflux disease)     Gout     Headache     HSV infection     Hyperglycemia     Hyperlipidemia     Hypertension     Obesity     Obstructive sleep apnea     Onychomycosis     Pain in joint, multiple sites     Sebaceous cyst     Skin nodule     OF ARM, LEFT    Snoring     Vision changes      Past Surgical History:   Procedure Laterality Date    APPENDECTOMY         Social History:  reports that he has never smoked. He has never used smokeless tobacco. He reports that he does not drink alcohol and does not use drugs.    Medications:  (Not in a hospital admission)    Current medications:  sodium chloride, 10 mL, Intravenous, Q12H      Current IV drips:  sodium chloride, 75 mL/hr, Last Rate: 75 mL/hr (24 0905)        Allergies:  Allergies   Allergen Reactions    Ozempic (0.25 Or 0.5 Mg-Dose) [Semaglutide(0.25 Or 0.5mg-Dos)] GI Intolerance    Sulfa Antibiotics Other (See Comments)     Rash, swelling, tingling, peeling.       Objective    Objective     Vitals:   Temp:  [97.7 °F (36.5 °C)] 97.7 °F (36.5 °C)  Heart Rate:  [72-76] 76  Resp:  [12-20] 20  BP: (106-123)/(62-72) 123/72      Physical  "Exam:   Constitutional: Awake, alert, No acute distress    Respiratory: Clear to auscultation bilaterally, nonlabored respirations    Cardiovascular: RRR, no murmurs, rubs, or gallops, palpable pedal pulses bilaterally   Gastrointestinal: Positive bowel sounds, soft, nontender, nondistended        ASA SCALE ASSESSMENT:  2-Mild to moderate systemic disease, medically well controlled, with no functional limitation    MALLAMPATI CLASSIFICATION:  2-Able to visualize the soft palate, fauces, uvula. The anterior & posterior tonsilar pillars are hidden by the tongue.       Result Review        Result Review:     No results found for: \"NA\"    No results found for: \"K\"    No results found for: \"CL\"    No results found for: \"PLASMABICARB\"    No results found for: \"BUN\"    No results found for: \"CREATININE\"    No results found for: \"CALCIUM\"        No components found for: \"GLUCOSE.*\"  Results from last 7 days   Lab Units 07/02/24  0744   WBC 10*3/mm3 2.06*   HEMOGLOBIN g/dL 11.8*   HEMATOCRIT % 37.0*   PLATELETS 10*3/mm3 138*      Results from last 7 days   Lab Units 07/02/24  0744   INR  1.20*           Assessment / Plan     Assesment:   Thrombocytopenia.      Plan:   CT guided bone marrow aspiration and biopsy.    The risks and benefits of the procedure were discussed with the patient.    Electronically signed by SAVANAH Marquis, 07/02/24, 9:15 AM EDT.  "

## 2024-07-02 NOTE — DISCHARGE INSTRUCTIONS
A responsible adult should stay with you and you should rest quietly for the rest of the day. Do not drink alcohol, drive or cook for 24 hours following your procedure.  Progress your diet as tolerated.  Resume your usual medications including aspirin.  When you remove your dressing in 48 hours, a small amount of blood is to be expected. Do not be alarmed.  If you feel it is bleeding excessively apply pressure and proceed to the Emergency room.  Do not shower, bath, or get your dressing wet at all for 48 hours.  You may shower after the dressing is removed. No lifting more that 10 pounds for 48 hours.  If severe pain, increased shortness of air or racing heartbeat occur, seek immediate medical attention.  Follow up with Dr. Greenwood for results.

## 2024-07-04 LAB — Lab: NORMAL

## 2024-07-09 DIAGNOSIS — Z90.49 S/P APPENDECTOMY: ICD-10-CM

## 2024-07-09 DIAGNOSIS — R20.2 NUMBNESS AND TINGLING IN BOTH HANDS: ICD-10-CM

## 2024-07-09 DIAGNOSIS — R20.0 NUMBNESS AND TINGLING IN BOTH HANDS: ICD-10-CM

## 2024-07-10 LAB
BLOOD OR BONE MARROW RESULT: NORMAL
CYTOGENETICS RESULT: NORMAL

## 2024-07-10 RX ORDER — HYDROCODONE BITARTRATE AND ACETAMINOPHEN 5; 325 MG/1; MG/1
1 TABLET ORAL EVERY 6 HOURS PRN
Qty: 60 TABLET | Refills: 0 | Status: SHIPPED | OUTPATIENT
Start: 2024-07-10

## 2024-07-11 LAB
DX PRELIMINARY: NORMAL
LAB AP CASE REPORT: NORMAL
PATH REPORT.FINAL DX SPEC: NORMAL
PATH REPORT.GROSS SPEC: NORMAL

## 2024-07-14 LAB — CCV RESULT: NORMAL

## 2024-07-15 ENCOUNTER — OFFICE VISIT (OUTPATIENT)
Dept: FAMILY MEDICINE CLINIC | Facility: CLINIC | Age: 44
End: 2024-07-15
Payer: COMMERCIAL

## 2024-07-15 VITALS
SYSTOLIC BLOOD PRESSURE: 127 MMHG | BODY MASS INDEX: 41.52 KG/M2 | HEIGHT: 64 IN | OXYGEN SATURATION: 98 % | DIASTOLIC BLOOD PRESSURE: 74 MMHG | WEIGHT: 243.2 LBS | HEART RATE: 81 BPM | RESPIRATION RATE: 18 BRPM | TEMPERATURE: 98.4 F

## 2024-07-15 DIAGNOSIS — E78.2 MIXED HYPERLIPIDEMIA: ICD-10-CM

## 2024-07-15 DIAGNOSIS — R26.0 ATAXIC GAIT: ICD-10-CM

## 2024-07-15 DIAGNOSIS — R60.0 BILATERAL EDEMA OF LOWER EXTREMITY: Primary | ICD-10-CM

## 2024-07-15 DIAGNOSIS — D69.6 THROMBOCYTOPENIA: ICD-10-CM

## 2024-07-15 DIAGNOSIS — M25.50 MULTIPLE JOINT PAIN: ICD-10-CM

## 2024-07-15 DIAGNOSIS — K75.81 NASH (NONALCOHOLIC STEATOHEPATITIS): ICD-10-CM

## 2024-07-15 DIAGNOSIS — L29.9 PRURITUS: ICD-10-CM

## 2024-07-15 DIAGNOSIS — D72.829 LEUKOCYTOSIS, UNSPECIFIED TYPE: ICD-10-CM

## 2024-07-15 DIAGNOSIS — K21.9 GASTROESOPHAGEAL REFLUX DISEASE WITHOUT ESOPHAGITIS: ICD-10-CM

## 2024-07-15 PROCEDURE — 99214 OFFICE O/P EST MOD 30 MIN: CPT | Performed by: NURSE PRACTITIONER

## 2024-07-15 RX ORDER — MELOXICAM 15 MG/1
15 TABLET ORAL DAILY
Qty: 90 TABLET | Refills: 1 | Status: SHIPPED | OUTPATIENT
Start: 2024-07-15

## 2024-07-15 RX ORDER — OMEPRAZOLE 40 MG/1
40 CAPSULE, DELAYED RELEASE ORAL DAILY
Qty: 90 CAPSULE | Refills: 1 | Status: SHIPPED | OUTPATIENT
Start: 2024-07-15

## 2024-07-15 RX ORDER — GABAPENTIN 400 MG/1
400 CAPSULE ORAL 3 TIMES DAILY
Qty: 90 CAPSULE | Refills: 2 | Status: SHIPPED | OUTPATIENT
Start: 2024-07-15

## 2024-07-15 NOTE — PROGRESS NOTES
Chief Complaint  Chief Complaint   Patient presents with    DEGROOT     3 month follow up           Subjective          Farhad Khan presents to [facility] for   History of Present Illness      Patient was referred to oncology/hematology for thrombocytopenia, neutropenia and leukocytosis. 7/2/24 underwent bone marrow biopsy. He complains of fatigue and more overall aching pain lately, insomnia, hard to get going in the mornings.     DEGROOT, with splenomegaly and leukopenia, he is following with hematology and gastro. He underwent appendectomy on 10/10/2023, course was complicated. Swelling of BLE has improved on spironolactone daily.  He complains of itching, worse at night.  Numbness and tingling of both hands/BLE is improving, he is on gabapentin and using less norco now. He is tolerating a regular diet, has good appetite. Recommended by Dr. Pizano if he continues to have gas and bloating was recommended for general surgery for cholecystectomy.    -On 1/2/2024 he underwent ERCP, esophageal dilation and biliary sphincterotomy of the common bile duct per Dr. Pizano.    -itching still present, worse at night      He c/o more pain/stiffness/aching in the hands, the pins needle sensation of BLE has improved. He is going to the gym, trying to get 3-6k steps in daily. He feels more steady on his feet.     HTN, off lisinopril now, taking only spironolactone. stable on meds and takes as directed, denies chest pain, headache, shortness of air, palpitations and swelling of extremities.     RLS, doing well off of mirapex currently      The following portions of the patient's history were reviewed and updated as appropriate: allergies, current medications, past family history, past medical history, past social history, past surgical history and problem list.    Past Medical History:   Diagnosis Date    B12 deficiency     Chronic fatigue     Dizziness     Elevated LFTs     Fatty liver      "GERD (gastroesophageal reflux disease)     Gout     Headache     HSV infection     Hyperglycemia     Hyperlipidemia     Hypertension     Obesity     Obstructive sleep apnea     Onychomycosis     Pain in joint, multiple sites     Sebaceous cyst     Skin nodule     Snoring     Vision changes      Past Surgical History:   Procedure Laterality Date    APPENDECTOMY       Family History   Problem Relation Age of Onset    Sleep apnea Father     COPD Father     Other Brother         Desmoid tumor of the abdomen    Hypertension Other     Rheum arthritis Other      Social History     Tobacco Use    Smoking status: Never    Smokeless tobacco: Never   Substance Use Topics    Alcohol use: No       Current Outpatient Medications:     clobetasol propionate (TEMOVATE) 0.05 % cream, Apply 1 Application topically to the appropriate area as directed 2 (Two) Times a Day., Disp: 30 g, Rfl: 2    Docusate Sodium (COLACE PO), Per instructions 2 TIMES DAILY (route: oral), Disp: , Rfl:     gabapentin (NEURONTIN) 400 MG capsule, Take 1 capsule by mouth 3 (Three) Times a Day., Disp: 90 capsule, Rfl: 2    Gas-X Ultra Strength 180 MG capsule, , Disp: , Rfl:     HYDROcodone-acetaminophen (NORCO) 5-325 MG per tablet, Take 1 tablet by mouth Every 6 (Six) Hours As Needed for Severe Pain., Disp: 60 tablet, Rfl: 0    hydrOXYzine (ATARAX) 25 MG tablet, Take 1 tablet by mouth 3 (Three) Times a Day As Needed for Itching., Disp: 90 tablet, Rfl: 2    omeprazole (priLOSEC) 40 MG capsule, Take 1 capsule by mouth Daily., Disp: 90 capsule, Rfl: 1    spironolactone (ALDACTONE) 50 MG tablet, Take 1 tablet by mouth Daily As Needed (swelling)., Disp: 90 tablet, Rfl: 1    meloxicam (Mobic) 15 MG tablet, Take 1 tablet by mouth Daily., Disp: 90 tablet, Rfl: 1    Objective   Vital Signs:   /74 (BP Location: Left arm, Patient Position: Sitting, Cuff Size: Large Adult)   Pulse 81   Temp 98.4 °F (36.9 °C) (Oral)   Resp 18   Ht 162.6 cm (64\")   Wt 110 kg (243 " lb 3.2 oz)   SpO2 98% Comment: Room air  BMI 41.75 kg/m²           Physical Exam  Constitutional:       General: He is not in acute distress.     Appearance: Normal appearance. He is well-developed. He is not ill-appearing or diaphoretic.   HENT:      Head: Normocephalic.   Eyes:      Conjunctiva/sclera: Conjunctivae normal.      Pupils: Pupils are equal, round, and reactive to light.   Neck:      Thyroid: No thyromegaly.      Vascular: No JVD.   Cardiovascular:      Rate and Rhythm: Normal rate and regular rhythm.      Heart sounds: Normal heart sounds. No murmur heard.  Pulmonary:      Effort: Pulmonary effort is normal. No respiratory distress.      Breath sounds: Normal breath sounds. No wheezing or rhonchi.   Abdominal:      General: Bowel sounds are normal. There is no distension.      Palpations: Abdomen is soft.      Tenderness: There is no abdominal tenderness.   Musculoskeletal:         General: Swelling (trace, wearing knee high compression) present. No tenderness. Normal range of motion.      Cervical back: Normal range of motion and neck supple. No tenderness.   Lymphadenopathy:      Cervical: No cervical adenopathy.   Skin:     General: Skin is warm and dry.      Coloration: Skin is not jaundiced.      Findings: No erythema or rash.   Neurological:      General: No focal deficit present.      Mental Status: He is alert and oriented to person, place, and time. Mental status is at baseline.      Sensory: No sensory deficit.      Motor: No weakness.      Gait: Gait abnormal (improving).   Psychiatric:         Mood and Affect: Mood normal.         Behavior: Behavior normal.         Thought Content: Thought content normal.         Judgment: Judgment normal.          Result Review :     No visits with results within 7 Day(s) from this visit.   Latest known visit with results is:   Hospital Outpatient Visit on 07/02/2024   Component Date Value Ref Range Status    Protime 07/02/2024 12.9 (H)  9.6 - 11.7  Seconds Final    INR 07/02/2024 1.20 (H)  0.93 - 1.10 Final    PTT 07/02/2024 31.3 (H)  24.0 - 31.0 seconds Final    WBC 07/02/2024 2.06 (L)  3.40 - 10.80 10*3/mm3 Final    RBC 07/02/2024 4.62  4.14 - 5.80 10*6/mm3 Final    Hemoglobin 07/02/2024 11.8 (L)  13.0 - 17.7 g/dL Final    Hematocrit 07/02/2024 37.0 (L)  37.5 - 51.0 % Final    MCV 07/02/2024 80.1  79.0 - 97.0 fL Final    MCH 07/02/2024 25.5 (L)  26.6 - 33.0 pg Final    MCHC 07/02/2024 31.9  31.5 - 35.7 g/dL Final    RDW 07/02/2024 14.9  12.3 - 15.4 % Final    RDW-SD 07/02/2024 43.3  37.0 - 54.0 fl Final    MPV 07/02/2024 9.2  6.0 - 12.0 fL Final    Platelets 07/02/2024 138 (L)  140 - 450 10*3/mm3 Final    Neutrophil % 07/02/2024 51.0  42.7 - 76.0 % Final    Lymphocyte % 07/02/2024 35.4  19.6 - 45.3 % Final    Monocyte % 07/02/2024 13.1 (H)  5.0 - 12.0 % Final    Eosinophil % 07/02/2024 0.0 (L)  0.3 - 6.2 % Final    Basophil % 07/02/2024 0.0  0.0 - 1.5 % Final    Immature Grans % 07/02/2024 0.5  0.0 - 0.5 % Final    Neutrophils, Absolute 07/02/2024 1.05 (L)  1.70 - 7.00 10*3/mm3 Final    Lymphocytes, Absolute 07/02/2024 0.73  0.70 - 3.10 10*3/mm3 Final    Monocytes, Absolute 07/02/2024 0.27  0.10 - 0.90 10*3/mm3 Final    Eosinophils, Absolute 07/02/2024 0.00  0.00 - 0.40 10*3/mm3 Final    Basophils, Absolute 07/02/2024 0.00  0.00 - 0.20 10*3/mm3 Final    Immature Grans, Absolute 07/02/2024 0.01  0.00 - 0.05 10*3/mm3 Final    nRBC 07/02/2024 0.0  0.0 - 0.2 /100 WBC Final    Case Report 07/02/2024    Final                    Value:Surgical Pathology Report                         Case: [id number]                                  Authorizing Provider:  Sravan Greenwood MD       Collected:           07/02/2024 09:33 AM          Ordering Location:     [facility]    Received:            07/02/2024 09:56 AM          Pathologist:           Hermann Welch MD                                                            Specimens:   1) - Iliac Crest, Left -  Aspirate, left iliac crest                                                 2) - Iliac Crest, Left - Aspirate                                                                   3) - Iliac Crest, Left - Biopsy                                                            Final Diagnosis 07/02/2024    Final                    Value:This result contains rich text formatting which cannot be displayed here.    Preliminary Diagnosis 07/02/2024    Final                    Value:This result contains rich text formatting which cannot be displayed here.    Gross Description 07/02/2024    Final                    Value:This result contains rich text formatting which cannot be displayed here.    Consult Global Result 07/02/2024 Comment^Text^TXT   Final    Bone marrow, left iliac crest:  No significant immunophenotypic abnormality detected.  DISCLAIMER: REFER TO HARDCOPY OR PDF FOR COMPLETE RESULT.   If synopsis provided, clinical decisions should not be   based on this interfaced synopsis alone.  Performed at:  1 - [facility]  [address], [place] [address]  :  Cristy Alberts M.D., Ph.D., Phone:  [phone]    Blood or Bone Marrow Result 07/02/2024 Comment^Text^TXT   Final    BONE MARROW, BIOPSY AND ASPIRATION:  1) Variably cellular marrow (in the range of 30-60%),   overall normocellular for age; adequate trilineage   hematopoiesis.  2) Rare reactive appearing lymphoid aggregates.  3)  Patchy, mild reticulin fibrosis.  4)  No definitive storage iron detected.  DISCLAIMER: REFER TO HARDCOPY OR PDF FOR COMPLETE RESULT.   If synopsis provided, clinical decisions should not be   based on this interfaced synopsis alone.  Performed at:  1 - [facility]  [address], [place] [address]  :  Cristy Alberts M.D., Ph.D., Phone:  [phone]    Cytogenetics Result 07/02/2024 Comment^Text^TXT   Final    46,XY[20]  Normal Male  Karyotype  DISCLAIMER: REFER TO HARDCOPY OR PDF FOR COMPLETE RESULT.   If synopsis provided, clinical decisions should not be   based on this interfaced synopsis alone.  Performed at:  1 - [facility]  [address], [place]  [address]  :  Cristy Alberts M.D., Ph.D., Phone:  [phone]    CCV RESULT 07/02/2024 Comment^Text^TXT   Final    See Report  DISCLAIMER: REFER TO HARDCOPY OR PDF FOR COMPLETE RESULT.   If synopsis provided, clinical decisions should not be   based on this interfaced synopsis alone.  Performed at:  1 - [facility]  [address], [place]  [address]  :  Cristy Alberts M.D., Ph.D., Phone:  [phone]                              Assessment and Plan    Diagnoses and all orders for this visit:    1. Bilateral edema of lower extremity (Primary)    2. DEGROOT (nonalcoholic steatohepatitis)    3. Thrombocytopenia    4. Pruritus    5. Gastroesophageal reflux disease without esophagitis    6. Ataxic gait    7. Multiple joint pain    8. Leukocytosis, unspecified type    9. Mixed hyperlipidemia    Other orders  -     meloxicam (Mobic) 15 MG tablet; Take 1 tablet by mouth Daily.  Dispense: 90 tablet; Refill: 1  -     gabapentin (NEURONTIN) 400 MG capsule; Take 1 capsule by mouth 3 (Three) Times a Day.  Dispense: 90 capsule; Refill: 2  -     omeprazole (priLOSEC) 40 MG capsule; Take 1 capsule by mouth Daily.  Dispense: 90 capsule; Refill: 1      Condition is overall still improving  Try meloxicam for aching/stiff joints, try to  limit use of norco  Discuss itching with gastro and other meds to help with this, cont hydroxyzine  Cont with heme/onc  Cont current med regimen  Cont walking, strengthening      I spent 30 minutes caring for Farhad Khan on this date of service. This time includes time spent by me in the following activities: preparing for the visit, reviewing tests, performing a  medically appropriate examination and/or evaluation , counseling and educating the patient/family/caregiver, ordering medications, tests, or procedures and documenting information in the medical record        Follow Up     Return in about 6 months (around 1/15/2025) for Recheck, Annual physical. HTN panel prior to appt .  Patient was given instructions and counseling regarding his condition or for health maintenance advice. Please see specific information pulled into the AVS if appropriate.        Part of this note may be an electronic transcription/translation of spoken language to printed text using the Dragon Dictation System

## 2024-07-23 NOTE — PROGRESS NOTES
HEMATOLOGY ONCOLOGY OUTPATIENT FOLLOW-UP      Patient name: Farhad Khan  : 1980  MRN: [id number]  Primary Care Physician: Jacqueline Dwyer APRN  Referring Physician: No ref. provider found  Reason For Consult:     Chief Complaint   Patient presents with    Follow-up     Thrombocytopenia     HPI:   History of Present Illness:  Farhad Khan is 44 y.o. male who presented to the office on 09/15/22 for consultation regarding    9/15/2022: Mr. Khan was referred for the investigation of leukopenia, neutropenia and thrombocytopenia.  He was first noted to have moderate leukopenia approximately 1 year before this visit.  Over the course of several months the leukopenia became somewhat worse and at some point his total white cells were less than 2.0.  Approximately 3 weeks before this visit this had improved and the total white cell count was up to 2.4 and this was associated to moderate neutropenia.  On this basis he was referred.  He had no new symptoms.  For some time, perhaps as many as 3 years, he had been experiencing fatigue and general malaise.  He had not had any changes in his work and he was able to fulfill all his duties.  He had been afebrile and without weight loss.  For at least 2 or 3 years he had maintained a similar weight.  He had been without chest pains or cough.  And denied expectoration or hemoptysis.  No dysphagia.  He had had no abdominal pain and denied diarrhea or dysuria.  He had not experienced any peripheral edema.  No skin rash.  For approximately 2 years he has been taking a combination of medications that included lisinopril, omeprazole and rosuvastatin.    2022: In the office to review the results of his tests.  Felt reasonably well and perhaps better than the previous week he had been much more fatigued at that time.  Eating well.  Reported frequent diarrhea but only intermittently.  Denied fevers.  Had had no chest pains and no increasing  dyspnea.  No abdominal pain at the time but did admit to intermittent right upper quadrant pain.  No edema.  The met laboratory exams were essentially unremarkable though they did confirm leukopenia, neutropenia and thrombocytopenia.  No suggestion of a bone marrow disorder, howeve.  The ultrasound of the abdomen confirmed the presence of a liver with cirrhotic morphology and splenomegaly.  It was felt that the explanation for his cytopenias was this splenomegaly.    11/18/2022: Feeling reasonably well. Continued to work and described being more active than before. Eating well and stable weight, though his wife described that he did not eat enough to justify his weight. Seen by the nurse practitioner at Dr. Knight's office. He was offered a clinical trial but did not offer anything else. The physical exam was unchanged. He persisted with moderate leukopenia and neutropenia, as well as thrombocytopenia. They had not changed and were not associated to any other problems. A decision was made to continue to follow.     5/19/2023: Without new symptoms.  Was seen at the gastroenterologist office and was placed on a trial testing, and on 2 drugs, semaglutide for the treatment of steatohepatitis.  Had lost approximately 12 pounds and since the commencement of the study and was experiencing some nausea as well as a reduction in his appetite.  He had been afebrile.  The exam disclosed no changes.  A dentulous, without any oral ulcers.  No palpable lymphadenopathy.  Abdomen protuberant.  Difficult to tell if the liver or spleen were enlarged as before.  No peripheral edema.  The white cell count had decreased to 1860/mm³, with neutrophils, as well lower than 1000/mm³ at 860/mm³.  The platelet count remained in the same range as before at 118,000/mm³.  A long discussion was had with him in regards to prevention of infections and neutropenic fever.  He was also asked to call immediately if he should have a fever.  Antinuclear  antibodies and complement were requested and he was asked to return in 3 weeks.  Also requested information on the clinical trial to see what other drug he could be receiving.     6/9/2023: Feeling well and without new symptoms.  Continues to participate in a clinical trial and had some modifications to his doses.  He continues to lose weight and has been consistently losing approximately 5 pounds per week.  No appetite.  Frequently nauseated but no diarrhea.  Afebrile.  No chest pains or cough.  On exam no changes.  The laboratory exams were reviewed.  The neutropenia had resolved.  RADHA and complement unremarkable.  Discussed with him.    9/29/2023: Not feeling well.  Nauseated.  Unable to eat.  Losing weight rapidly.  This started with an increase in the dose of the study medications that he has been receiving for the treatment of his steatohepatitis.  A few days ago he received some intravenous fluids and the dose of the medications was reduced.  However, in spite of that, he has persisted with the same symptoms.  He has been afebrile.  On exam he is well-hydrated.  Has lost a large amount of weight.  No jaundice.  The lungs are clear.  The heart is regular.  The abdomen is soft.  Liver and spleen are nonenlarged.  No edema.  The laboratory exams reported a white blood cell count of 2640/mm³.  He had minor absolute lymphocytopenia, however, his neutrophil count was well within the normal range.  Hemoglobin normal at 16.7 g/dL with normocytic red cells and platelets improved at 131,000/mm³.  A decision was made to continue to observe without intervention.  A discussion was had with him about his symptoms.  He had been tempted to discontinue the study medications.  Given that he has not been able to eat and that he is constantly vomiting it probably would be reasonable to stop.    2/5/2024: Much better.  Following the previous appointment, sometime in October 2023, he developed abdominal pain and sought attention  from the hospital.  A diagnosis of appendicitis was made.  He was transferred to [place] in [facility] and underwent a laparoscopic appendectomy without complications.  However, following discharge, he could not walk because of muscle weakness.  He has been receiving physical therapy since then and is progressively stronger, now walking.  He is off of the study protocol.  He is able to eat.  He has maintained a stable weight.  He has been diagnosed with pathology of the gallbladder and is going to receive a cholecystectomy sometime in the future.  He has had no dyspnea or chest pains.  On exam he appears to be a chronically ill man.  He does not seem in distress.  He is not jaundiced.  He is a dentulous.  There is no palpable lymphadenopathy.  The lungs are clear.  The heart is regular.  The abdomen is rounded and protuberant but soft.  There is no edema.  Laboratory exams reported a white blood cell count of 2007/mm³ with eosinopenia, and eosinophil count of 0/mm³ and moderate neutropenia with an absolute neutrophil number of 1180/mm³.  The hemoglobin and platelet count are today unremarkable.  A decision was made to continue to observe.  Will measure again B12 and folic acid and will see with results in approximately 3 months.    5/6/2024: Much better.  Able to walk and exercising regularly.  Has regained some of the weight that he had lost and is progressively stronger.  He has had no nausea or vomiting.  He has had no chest pains or cough and denies abdominal pain.  He had edema of the lower extremities and was placed on spironolactone which he has been taking every day.  On exam he appears chronically ill.  No distress.  No jaundice.  The lungs are diminished bilaterally.  The heart is regular.  The abdomen is soft but protuberant and not tender.  There is no edema.  Laboratory exams reviewed.  The white cell count is up to 1930/mm³ with slight monocytosis and persistent neutropenia.  This is a new  development and probably related to the use of spironolactone.  I have instructed him to discontinue the diuretic.  I will have his blood count checked every week and will see him in approximately 6 weeks.  He is to call if any problems arise prior to the next visit.    6/19/2024: Feeling much better. Now able to walk longer and with better equilibrium. His appetite has improved, as well and he has gained some of the weight he had lost. He has been afebrile, though his temperature today was somewhat greater than 99. He has been without chest pain. No cough. No abdominal pain and no bleeding. He has persisted with peripheral edema but the use of diuretics and compression stockings have made a difference.     7/26/2024: Feels progressively better and is stronger. Eating well and no more nausea. No fevers. No chest pain or cough and no abdominal pain. On exam alert and appearing ill. No distress and no jaundice. No oral lesions and respirations not labored and the lungs are clear. The abdomen is protuberant but soft. There is edema. The laboratory exams and the report of pathology were reviewed and discussed with him.My impression, based on the results, that his pancytopenia is the result of his splenomegaly and liver dysfunction.     The following portions of the patient's history were reviewed and updated as appropriate: allergies, current medications, past family history, past medical history, past social history, past surgical history and problem list.    Past Medical History:   Diagnosis Date    B12 deficiency     Chronic fatigue     Dizziness     Elevated LFTs     Fatty liver     NON ALCOHOLIC    GERD (gastroesophageal reflux disease)     Gout     Headache     HSV infection     Hyperglycemia     Hyperlipidemia     Hypertension     Obesity     Obstructive sleep apnea     Onychomycosis     Pain in joint, multiple sites     Sebaceous cyst     Skin nodule     OF ARM, LEFT    Snoring     Vision changes      Past  Surgical History:   Procedure Laterality Date    APPENDECTOMY         Current Outpatient Medications:     clobetasol propionate (TEMOVATE) 0.05 % cream, Apply 1 Application topically to the appropriate area as directed 2 (Two) Times a Day., Disp: 30 g, Rfl: 2    Docusate Sodium (COLACE PO), Per instructions 2 TIMES DAILY (route: oral), Disp: , Rfl:     gabapentin (NEURONTIN) 400 MG capsule, Take 1 capsule by mouth 3 (Three) Times a Day., Disp: 90 capsule, Rfl: 2    Gas-X Ultra Strength 180 MG capsule, , Disp: , Rfl:     HYDROcodone-acetaminophen (NORCO) 5-325 MG per tablet, Take 1 tablet by mouth Every 6 (Six) Hours As Needed for Severe Pain., Disp: 60 tablet, Rfl: 0    hydrOXYzine (ATARAX) 25 MG tablet, Take 1 tablet by mouth 3 (Three) Times a Day As Needed for Itching., Disp: 90 tablet, Rfl: 2    meloxicam (Mobic) 15 MG tablet, Take 1 tablet by mouth Daily., Disp: 90 tablet, Rfl: 1    omeprazole (priLOSEC) 40 MG capsule, Take 1 capsule by mouth Daily., Disp: 90 capsule, Rfl: 1    spironolactone (ALDACTONE) 50 MG tablet, Take 1 tablet by mouth Daily As Needed (swelling)., Disp: 90 tablet, Rfl: 1    Allergies   Allergen Reactions    Ozempic (0.25 Or 0.5 Mg-Dose) [Semaglutide(0.25 Or 0.5mg-Dos)] GI Intolerance    Sulfa Antibiotics Swelling and Rash     Rash, swelling, tingling, peeling.     Family History   Problem Relation Age of Onset    Sleep apnea Father     COPD Father     Other Brother         Desmoid tumor of the abdomen    Hypertension Other     Rheum arthritis Other      Cancer-related family history is not on file.    Social History     Tobacco Use    Smoking status: Never    Smokeless tobacco: Never   Vaping Use    Vaping status: Never Used   Substance Use Topics    Alcohol use: No    Drug use: No     Social History     Social History Narrative    Not on file      ROS:     Review of Systems   Constitutional:  Positive for fatigue. Negative for activity change, appetite change, chills, diaphoresis, fever  "and unexpected weight change.   HENT:  Negative for congestion, dental problem, drooling, ear discharge, ear pain, facial swelling, hearing loss, mouth sores, nosebleeds, postnasal drip, rhinorrhea, sinus pressure, sinus pain, sneezing, sore throat, tinnitus, trouble swallowing and voice change.    Eyes:  Negative for photophobia, pain, discharge, redness, itching and visual disturbance.   Respiratory:  Negative for apnea, cough, choking, chest tightness, shortness of breath, wheezing and stridor.    Cardiovascular:  Negative for chest pain, palpitations and leg swelling.   Gastrointestinal:  Positive for nausea and vomiting. Negative for abdominal distention, abdominal pain, anal bleeding, blood in stool, constipation, diarrhea and rectal pain.   Endocrine: Negative for cold intolerance, heat intolerance, polydipsia and polyuria.   Genitourinary:  Negative for decreased urine volume, difficulty urinating, dysuria, flank pain, frequency, genital sores, hematuria and urgency.   Musculoskeletal:  Negative for arthralgias, back pain, gait problem, joint swelling, myalgias, neck pain and neck stiffness.   Skin:  Negative for color change, pallor and rash.   Neurological:  Positive for weakness. Negative for dizziness, tremors, seizures, syncope, facial asymmetry, speech difficulty, light-headedness, numbness and headaches.   Hematological:  Negative for adenopathy. Does not bruise/bleed easily.   Psychiatric/Behavioral:  Negative for agitation, behavioral problems, confusion, decreased concentration, hallucinations, self-injury, sleep disturbance and suicidal ideas. The patient is not nervous/anxious.      Objective:    Vitals:    07/26/24 1346   BP: 122/75   Pulse: 81   SpO2: 96%   Weight: 115 kg (253 lb)   Height: 162.5 cm (63.98\")   PainSc: 3  Comment: hands     Body mass index is 43.46 kg/m².  ECOG  (0) Fully active, able to carry on all predisease performance without restriction    Physical Exam:     Physical " Exam  Constitutional:       General: He is not in acute distress.     Appearance: He is ill-appearing. He is not toxic-appearing or diaphoretic.      Comments: Well-built, oriented and in no distress but seems ill.   HENT:      Head: Normocephalic and atraumatic.      Right Ear: External ear normal.      Left Ear: External ear normal.      Nose: Nose normal.      Mouth/Throat:      Mouth: Mucous membranes are moist.      Pharynx: Oropharynx is clear.      Comments: Edentulous  Eyes:      General: No scleral icterus.        Right eye: No discharge.         Left eye: No discharge.      Conjunctiva/sclera: Conjunctivae normal.      Pupils: Pupils are equal, round, and reactive to light.   Cardiovascular:      Rate and Rhythm: Normal rate and regular rhythm.      Pulses: Normal pulses.      Heart sounds: Normal heart sounds. No murmur heard.     No friction rub. No gallop.   Pulmonary:      Effort: No respiratory distress.      Breath sounds: No stridor. No wheezing, rhonchi or rales.   Chest:      Chest wall: No tenderness.   Abdominal:      General: Bowel sounds are normal. There is no distension.      Palpations: Abdomen is soft. There is no mass.      Tenderness: There is no abdominal tenderness. There is no right CVA tenderness, left CVA tenderness, guarding or rebound.      Comments: Protuberant, soft and not tender. The spleen is not palpable because of the body habitus.    Musculoskeletal:         General: No swelling, tenderness, deformity or signs of injury.      Cervical back: No rigidity.      Right lower leg: No edema.      Left lower leg: No edema.   Lymphadenopathy:      Cervical: No cervical adenopathy.   Skin:     General: Skin is warm and dry.      Coloration: Skin is not jaundiced.      Findings: No bruising or rash.   Neurological:      General: No focal deficit present.      Mental Status: He is alert and oriented to person, place, and time.      Gait: Gait normal.   Psychiatric:         Mood and  Affect: Mood normal.         Behavior: Behavior normal.         Thought Content: Thought content normal.         Judgment: Judgment normal.     DEMAR Greenwood MD performed the physical exam on 7/26/2024 as documented above.     Lab Results - Last 18 Months   Lab Units 07/26/24  1343 07/02/24  0744 06/19/24  1421   WBC 10*3/mm3 2.32* 2.06* 1.63*   HEMOGLOBIN g/dL 11.1* 11.8* 11.4*   HEMATOCRIT % 34.8* 37.0* 35.6*   PLATELETS 10*3/mm3 127* 138* 117*   MCV fL 80.7 80.1 82.2     Lab Results - Last 18 Months   Lab Units 06/19/24  1421 05/06/24  0956 04/08/24  1027   SODIUM mmol/L 136 136 139   POTASSIUM mmol/L 4.0 4.3 4.4   CHLORIDE mmol/L 102 100 105   CO2 mmol/L 25.2 26.0 27.1   BUN mg/dL 7 9 6   CREATININE mg/dL 0.82 0.63* 0.63*   CALCIUM mg/dL 8.8 8.7 8.5*   BILIRUBIN mg/dL 0.7 0.4 0.7   ALK PHOS U/L 112 145* 177*   ALT (SGPT) U/L 14 9 6   AST (SGOT) U/L 28 21 22   GLUCOSE mg/dL 133* 74 101*     Lab Results   Component Value Date    GLUCOSE 133 (H) 06/19/2024    BUN 7 06/19/2024    CREATININE 0.82 06/19/2024    EGFRIFNONA 107 09/16/2021    BCR 8.5 06/19/2024    K 4.0 06/19/2024    CO2 25.2 06/19/2024    CALCIUM 8.8 06/19/2024    ALBUMIN 3.7 06/19/2024    LABIL2 1.2 11/16/2018    AST 28 06/19/2024    ALT 14 06/19/2024     Uric Acid   Date Value Ref Range Status   08/04/2022 4.1 3.4 - 7.0 mg/dL Final     Assessment & Plan     Assessment:  Leukopenia and neutropenia: Improved. No clear explanation except for the splenomegaly. Will follow closely.   Thrombocytopenia: Persists. Will continue to follow.   Steatohepatitis and cirrhosis: Follow with gastroenterology.   He is to see me in 3 months.     Plan:  1. As above.     Sravan Greenwood MD on 7/26/2024 at 14:26

## 2024-07-26 ENCOUNTER — OFFICE VISIT (OUTPATIENT)
Dept: ONCOLOGY | Facility: CLINIC | Age: 44
End: 2024-07-26
Payer: COMMERCIAL

## 2024-07-26 ENCOUNTER — LAB (OUTPATIENT)
Dept: LAB | Facility: HOSPITAL | Age: 44
End: 2024-07-26
Payer: COMMERCIAL

## 2024-07-26 VITALS
SYSTOLIC BLOOD PRESSURE: 122 MMHG | BODY MASS INDEX: 43.19 KG/M2 | WEIGHT: 253 LBS | OXYGEN SATURATION: 96 % | HEIGHT: 64 IN | DIASTOLIC BLOOD PRESSURE: 75 MMHG | HEART RATE: 81 BPM

## 2024-07-26 DIAGNOSIS — D69.6 THROMBOCYTOPENIA: Primary | ICD-10-CM

## 2024-07-26 LAB
ALBUMIN SERPL-MCNC: 3.6 G/DL (ref 3.5–5.2)
ALBUMIN/GLOB SERPL: 1.2 G/DL
ALP SERPL-CCNC: 109 U/L (ref 39–117)
ALT SERPL W P-5'-P-CCNC: 14 U/L (ref 1–41)
ANION GAP SERPL CALCULATED.3IONS-SCNC: 10.6 MMOL/L (ref 5–15)
AST SERPL-CCNC: 26 U/L (ref 1–40)
BASOPHILS # BLD AUTO: 0 10*3/MM3 (ref 0–0.2)
BASOPHILS NFR BLD AUTO: 0 % (ref 0–1.5)
BILIRUB SERPL-MCNC: 0.4 MG/DL (ref 0–1.2)
BUN SERPL-MCNC: 6 MG/DL (ref 6–20)
BUN/CREAT SERPL: 8.8 (ref 7–25)
CALCIUM SPEC-SCNC: 8.9 MG/DL (ref 8.6–10.5)
CHLORIDE SERPL-SCNC: 103 MMOL/L (ref 98–107)
CO2 SERPL-SCNC: 23.4 MMOL/L (ref 22–29)
CREAT SERPL-MCNC: 0.68 MG/DL (ref 0.76–1.27)
DEPRECATED RDW RBC AUTO: 47 FL (ref 37–54)
EGFRCR SERPLBLD CKD-EPI 2021: 116.1 ML/MIN/1.73
EOSINOPHIL # BLD AUTO: 0 10*3/MM3 (ref 0–0.4)
EOSINOPHIL NFR BLD AUTO: 0 % (ref 0.3–6.2)
ERYTHROCYTE [DISTWIDTH] IN BLOOD BY AUTOMATED COUNT: 16.3 % (ref 12.3–15.4)
GLOBULIN UR ELPH-MCNC: 3.1 GM/DL
GLUCOSE SERPL-MCNC: 145 MG/DL (ref 65–99)
HCT VFR BLD AUTO: 34.8 % (ref 37.5–51)
HGB BLD-MCNC: 11.1 G/DL (ref 13–17.7)
HOLD SPECIMEN: NORMAL
LYMPHOCYTES # BLD AUTO: 0.7 10*3/MM3 (ref 0.7–3.1)
LYMPHOCYTES NFR BLD AUTO: 30.2 % (ref 19.6–45.3)
MCH RBC QN AUTO: 25.8 PG (ref 26.6–33)
MCHC RBC AUTO-ENTMCNC: 31.9 G/DL (ref 31.5–35.7)
MCV RBC AUTO: 80.7 FL (ref 79–97)
MONOCYTES # BLD AUTO: 0.28 10*3/MM3 (ref 0.1–0.9)
MONOCYTES NFR BLD AUTO: 12.1 % (ref 5–12)
NEUTROPHILS NFR BLD AUTO: 1.34 10*3/MM3 (ref 1.7–7)
NEUTROPHILS NFR BLD AUTO: 57.7 % (ref 42.7–76)
PLATELET # BLD AUTO: 127 10*3/MM3 (ref 140–450)
PMV BLD AUTO: 8.9 FL (ref 6–12)
POTASSIUM SERPL-SCNC: 4 MMOL/L (ref 3.5–5.2)
PROT SERPL-MCNC: 6.7 G/DL (ref 6–8.5)
RBC # BLD AUTO: 4.31 10*6/MM3 (ref 4.14–5.8)
SODIUM SERPL-SCNC: 137 MMOL/L (ref 136–145)
WBC NRBC COR # BLD AUTO: 2.32 10*3/MM3 (ref 3.4–10.8)

## 2024-07-26 PROCEDURE — 85025 COMPLETE CBC W/AUTO DIFF WBC: CPT

## 2024-07-26 PROCEDURE — 36415 COLL VENOUS BLD VENIPUNCTURE: CPT

## 2024-07-26 PROCEDURE — 80053 COMPREHEN METABOLIC PANEL: CPT | Performed by: INTERNAL MEDICINE

## 2024-08-02 ENCOUNTER — OFFICE (AMBULATORY)
Dept: URBAN - METROPOLITAN AREA CLINIC 64 | Facility: CLINIC | Age: 44
End: 2024-08-02
Payer: COMMERCIAL

## 2024-08-02 VITALS
DIASTOLIC BLOOD PRESSURE: 80 MMHG | HEIGHT: 64 IN | SYSTOLIC BLOOD PRESSURE: 135 MMHG | HEART RATE: 82 BPM | WEIGHT: 249.2 LBS

## 2024-08-02 DIAGNOSIS — K74.69 OTHER CIRRHOSIS OF LIVER: ICD-10-CM

## 2024-08-02 PROCEDURE — 99214 OFFICE O/P EST MOD 30 MIN: CPT | Performed by: INTERNAL MEDICINE

## 2024-08-07 RX ORDER — DOXYCYCLINE HYCLATE 100 MG
100 TABLET ORAL 2 TIMES DAILY
Qty: 20 TABLET | Refills: 0 | Status: SHIPPED | OUTPATIENT
Start: 2024-08-07 | End: 2024-08-17

## 2024-09-10 DIAGNOSIS — R20.2 NUMBNESS AND TINGLING IN BOTH HANDS: ICD-10-CM

## 2024-09-10 DIAGNOSIS — Z90.49 S/P APPENDECTOMY: ICD-10-CM

## 2024-09-10 DIAGNOSIS — R20.0 NUMBNESS AND TINGLING IN BOTH HANDS: ICD-10-CM

## 2024-09-11 RX ORDER — HYDROCODONE BITARTRATE AND ACETAMINOPHEN 5; 325 MG/1; MG/1
1 TABLET ORAL EVERY 6 HOURS PRN
Qty: 60 TABLET | Refills: 0 | Status: SHIPPED | OUTPATIENT
Start: 2024-09-11

## 2024-09-16 RX ORDER — GABAPENTIN 400 MG/1
400 CAPSULE ORAL 3 TIMES DAILY
Qty: 90 CAPSULE | Refills: 4 | Status: SHIPPED | OUTPATIENT
Start: 2024-09-16

## 2024-09-16 RX ORDER — SPIRONOLACTONE 50 MG/1
50 TABLET, FILM COATED ORAL DAILY PRN
Qty: 90 TABLET | Refills: 1 | Status: SHIPPED | OUTPATIENT
Start: 2024-09-16

## 2024-10-27 DIAGNOSIS — R20.0 NUMBNESS AND TINGLING IN BOTH HANDS: ICD-10-CM

## 2024-10-27 DIAGNOSIS — Z90.49 S/P APPENDECTOMY: ICD-10-CM

## 2024-10-27 DIAGNOSIS — R20.2 NUMBNESS AND TINGLING IN BOTH HANDS: ICD-10-CM

## 2024-10-29 RX ORDER — HYDROCODONE BITARTRATE AND ACETAMINOPHEN 5; 325 MG/1; MG/1
1 TABLET ORAL EVERY 6 HOURS PRN
Qty: 60 TABLET | Refills: 0 | Status: SHIPPED | OUTPATIENT
Start: 2024-10-29

## 2024-10-31 RX ORDER — HYDROXYZINE HYDROCHLORIDE 25 MG/1
TABLET, FILM COATED ORAL
Qty: 90 TABLET | Refills: 0 | Status: SHIPPED | OUTPATIENT
Start: 2024-10-31

## 2024-11-01 ENCOUNTER — LAB (OUTPATIENT)
Dept: LAB | Facility: HOSPITAL | Age: 44
End: 2024-11-01
Payer: COMMERCIAL

## 2024-11-01 ENCOUNTER — OFFICE VISIT (OUTPATIENT)
Dept: ONCOLOGY | Facility: CLINIC | Age: 44
End: 2024-11-01
Payer: COMMERCIAL

## 2024-11-01 VITALS
OXYGEN SATURATION: 98 % | WEIGHT: 289.4 LBS | SYSTOLIC BLOOD PRESSURE: 131 MMHG | BODY MASS INDEX: 49.41 KG/M2 | DIASTOLIC BLOOD PRESSURE: 80 MMHG | RESPIRATION RATE: 14 BRPM | HEIGHT: 64 IN | TEMPERATURE: 98.4 F | HEART RATE: 81 BPM

## 2024-11-01 DIAGNOSIS — D69.6 THROMBOCYTOPENIA: Primary | ICD-10-CM

## 2024-11-01 LAB
ALBUMIN SERPL-MCNC: 3.7 G/DL (ref 3.5–5.2)
ALBUMIN/GLOB SERPL: 1 G/DL
ALP SERPL-CCNC: 124 U/L (ref 39–117)
ALT SERPL W P-5'-P-CCNC: 11 U/L (ref 1–41)
ANION GAP SERPL CALCULATED.3IONS-SCNC: 8.8 MMOL/L (ref 5–15)
AST SERPL-CCNC: 29 U/L (ref 1–40)
BASOPHILS # BLD AUTO: 0 10*3/MM3 (ref 0–0.2)
BASOPHILS NFR BLD AUTO: 0 % (ref 0–1.5)
BILIRUB SERPL-MCNC: 0.6 MG/DL (ref 0–1.2)
BUN SERPL-MCNC: 5 MG/DL (ref 6–20)
BUN/CREAT SERPL: 6.1 (ref 7–25)
CALCIUM SPEC-SCNC: 9.1 MG/DL (ref 8.6–10.5)
CHLORIDE SERPL-SCNC: 102 MMOL/L (ref 98–107)
CO2 SERPL-SCNC: 26.2 MMOL/L (ref 22–29)
CREAT SERPL-MCNC: 0.82 MG/DL (ref 0.76–1.27)
DEPRECATED RDW RBC AUTO: 50.6 FL (ref 37–54)
EGFRCR SERPLBLD CKD-EPI 2021: 109.7 ML/MIN/1.73
EOSINOPHIL # BLD AUTO: 0 10*3/MM3 (ref 0–0.4)
EOSINOPHIL NFR BLD AUTO: 0 % (ref 0.3–6.2)
ERYTHROCYTE [DISTWIDTH] IN BLOOD BY AUTOMATED COUNT: 17.6 % (ref 12.3–15.4)
GLOBULIN UR ELPH-MCNC: 3.6 GM/DL
GLUCOSE SERPL-MCNC: 67 MG/DL (ref 65–99)
HCT VFR BLD AUTO: 38.5 % (ref 37.5–51)
HGB BLD-MCNC: 12.2 G/DL (ref 13–17.7)
HOLD SPECIMEN: NORMAL
LYMPHOCYTES # BLD AUTO: 0.78 10*3/MM3 (ref 0.7–3.1)
LYMPHOCYTES NFR BLD AUTO: 34.1 % (ref 19.6–45.3)
MCH RBC QN AUTO: 25.4 PG (ref 26.6–33)
MCHC RBC AUTO-ENTMCNC: 31.7 G/DL (ref 31.5–35.7)
MCV RBC AUTO: 80.2 FL (ref 79–97)
MONOCYTES # BLD AUTO: 0.29 10*3/MM3 (ref 0.1–0.9)
MONOCYTES NFR BLD AUTO: 12.7 % (ref 5–12)
NEUTROPHILS NFR BLD AUTO: 1.22 10*3/MM3 (ref 1.7–7)
NEUTROPHILS NFR BLD AUTO: 53.2 % (ref 42.7–76)
PLATELET # BLD AUTO: 136 10*3/MM3 (ref 140–450)
PMV BLD AUTO: 9.3 FL (ref 6–12)
POTASSIUM SERPL-SCNC: 4 MMOL/L (ref 3.5–5.2)
PROT SERPL-MCNC: 7.3 G/DL (ref 6–8.5)
RBC # BLD AUTO: 4.8 10*6/MM3 (ref 4.14–5.8)
SODIUM SERPL-SCNC: 137 MMOL/L (ref 136–145)
WBC NRBC COR # BLD AUTO: 2.29 10*3/MM3 (ref 3.4–10.8)

## 2024-11-01 PROCEDURE — 99213 OFFICE O/P EST LOW 20 MIN: CPT | Performed by: INTERNAL MEDICINE

## 2024-11-01 PROCEDURE — 85025 COMPLETE CBC W/AUTO DIFF WBC: CPT

## 2024-11-01 PROCEDURE — 36415 COLL VENOUS BLD VENIPUNCTURE: CPT

## 2024-11-01 PROCEDURE — 80053 COMPREHEN METABOLIC PANEL: CPT | Performed by: INTERNAL MEDICINE

## 2024-11-01 NOTE — PROGRESS NOTES
HEMATOLOGY ONCOLOGY OUTPATIENT FOLLOW-UP      Patient name: Farhad Khan  : 1980  MRN: [id number]  Primary Care Physician: Jacqueline Dwyer APRN  Referring Physician: Jacqueline Dwyer APRN  Reason For Consult:     Chief Complaint   Patient presents with    Follow-up     Thrombocytopenia         HPI:   History of Present Illness:    9/15/2022: Mr. Khan was referred for the investigation of leukopenia, neutropenia and thrombocytopenia.  He was first noted to have moderate leukopenia approximately 1 year before this visit.  Over the course of several months the leukopenia became somewhat worse and at some point his total white cells were less than 2.0.  Approximately 3 weeks before this visit this had improved and the total white cell count was up to 2.4 and this was associated to moderate neutropenia.  On this basis he was referred.  He had no new symptoms.  For some time, perhaps as many as 3 years, he had been experiencing fatigue and general malaise.  He had not had any changes in his work and he was able to fulfill all his duties.  He had been afebrile and without weight loss.  For at least 2 or 3 years he had maintained a similar weight.  He had been without chest pains or cough.  And denied expectoration or hemoptysis.  No dysphagia.  He had had no abdominal pain and denied diarrhea or dysuria.  He had not experienced any peripheral edema.  No skin rash.  For approximately 2 years he has been taking a combination of medications that included lisinopril, omeprazole and rosuvastatin.    2022: In the office to review the results of his tests.  Felt reasonably well and perhaps better than the previous week he had been much more fatigued at that time.  Eating well.  Reported frequent diarrhea but only intermittently.  Denied fevers.  Had had no chest pains and no increasing dyspnea.  No abdominal pain at the time but did admit to intermittent right upper quadrant pain.   No edema.  The met laboratory exams were essentially unremarkable though they did confirm leukopenia, neutropenia and thrombocytopenia.  No suggestion of a bone marrow disorder, howeve.  The ultrasound of the abdomen confirmed the presence of a liver with cirrhotic morphology and splenomegaly.  It was felt that the explanation for his cytopenias was this splenomegaly.    11/18/2022: Feeling reasonably well. Continued to work and described being more active than before. Eating well and stable weight, though his wife described that he did not eat enough to justify his weight. Seen by the nurse practitioner at Dr. Knight's office. He was offered a clinical trial but did not offer anything else. The physical exam was unchanged. He persisted with moderate leukopenia and neutropenia, as well as thrombocytopenia. They had not changed and were not associated to any other problems. A decision was made to continue to follow.     5/19/2023: Without new symptoms.  Was seen at the gastroenterologist office and was placed on a trial testing, and on 2 drugs, semaglutide for the treatment of steatohepatitis.  Had lost approximately 12 pounds and since the commencement of the study and was experiencing some nausea as well as a reduction in his appetite.  He had been afebrile.  The exam disclosed no changes.  A dentulous, without any oral ulcers.  No palpable lymphadenopathy.  Abdomen protuberant.  Difficult to tell if the liver or spleen were enlarged as before.  No peripheral edema.  The white cell count had decreased to 1860/mm³, with neutrophils, as well lower than 1000/mm³ at 860/mm³.  The platelet count remained in the same range as before at 118,000/mm³.  A long discussion was had with him in regards to prevention of infections and neutropenic fever.  He was also asked to call immediately if he should have a fever.  Antinuclear antibodies and complement were requested and he was asked to return in 3 weeks.  Also requested  information on the clinical trial to see what other drug he could be receiving.     6/9/2023: Feeling well and without new symptoms.  Continues to participate in a clinical trial and had some modifications to his doses.  He continues to lose weight and has been consistently losing approximately 5 pounds per week.  No appetite.  Frequently nauseated but no diarrhea.  Afebrile.  No chest pains or cough.  On exam no changes.  The laboratory exams were reviewed.  The neutropenia had resolved.  RADHA and complement unremarkable.  Discussed with him.    9/29/2023: Not feeling well.  Nauseated.  Unable to eat.  Losing weight rapidly.  This started with an increase in the dose of the study medications that he has been receiving for the treatment of his steatohepatitis.  A few days ago he received some intravenous fluids and the dose of the medications was reduced.  However, in spite of that, he has persisted with the same symptoms.  He has been afebrile.  On exam he is well-hydrated.  Has lost a large amount of weight.  No jaundice.  The lungs are clear.  The heart is regular.  The abdomen is soft.  Liver and spleen are nonenlarged.  No edema.  The laboratory exams reported a white blood cell count of 2640/mm³.  He had minor absolute lymphocytopenia, however, his neutrophil count was well within the normal range.  Hemoglobin normal at 16.7 g/dL with normocytic red cells and platelets improved at 131,000/mm³.  A decision was made to continue to observe without intervention.  A discussion was had with him about his symptoms.  He had been tempted to discontinue the study medications.  Given that he has not been able to eat and that he is constantly vomiting it probably would be reasonable to stop.    2/5/2024: Much better.  Following the previous appointment, sometime in October 2023, he developed abdominal pain and sought attention from the hospital.  A diagnosis of appendicitis was made.  He was transferred to [place] in  [facility] and underwent a laparoscopic appendectomy without complications.  However, following discharge, he could not walk because of muscle weakness.  He has been receiving physical therapy since then and is progressively stronger, now walking.  He is off of the study protocol.  He is able to eat.  He has maintained a stable weight.  He has been diagnosed with pathology of the gallbladder and is going to receive a cholecystectomy sometime in the future.  He has had no dyspnea or chest pains.  On exam he appears to be a chronically ill man.  He does not seem in distress.  He is not jaundiced.  He is a dentulous.  There is no palpable lymphadenopathy.  The lungs are clear.  The heart is regular.  The abdomen is rounded and protuberant but soft.  There is no edema.  Laboratory exams reported a white blood cell count of 2007/mm³ with eosinopenia, and eosinophil count of 0/mm³ and moderate neutropenia with an absolute neutrophil number of 1180/mm³.  The hemoglobin and platelet count are today unremarkable.  A decision was made to continue to observe.  Will measure again B12 and folic acid and will see with results in approximately 3 months.    5/6/2024: Much better.  Able to walk and exercising regularly.  Has regained some of the weight that he had lost and is progressively stronger.  He has had no nausea or vomiting.  He has had no chest pains or cough and denies abdominal pain.  He had edema of the lower extremities and was placed on spironolactone which he has been taking every day.  On exam he appears chronically ill.  No distress.  No jaundice.  The lungs are diminished bilaterally.  The heart is regular.  The abdomen is soft but protuberant and not tender.  There is no edema.  Laboratory exams reviewed.  The white cell count is up to 1930/mm³ with slight monocytosis and persistent neutropenia.  This is a new development and probably related to the use of spironolactone.  I have instructed him to  discontinue the diuretic.  I will have his blood count checked every week and will see him in approximately 6 weeks.  He is to call if any problems arise prior to the next visit.    6/19/2024: Feeling much better. Now able to walk longer and with better equilibrium. His appetite has improved, as well and he has gained some of the weight he had lost. He has been afebrile, though his temperature today was somewhat greater than 99. He has been without chest pain. No cough. No abdominal pain and no bleeding. He has persisted with peripheral edema but the use of diuretics and compression stockings have made a difference.     7/26/2024: Feels progressively better and is stronger. Eating well and no more nausea. No fevers. No chest pain or cough and no abdominal pain. On exam alert and appearing ill. No distress and no jaundice. No oral lesions and respirations not labored and the lungs are clear. The abdomen is protuberant but soft. There is edema. The laboratory exams and the report of pathology were reviewed and discussed with him.My impression, based on the results, that his pancytopenia is the result of his splenomegaly and liver dysfunction.     11/1/2024: Progressively stronger. Gaining weight and now above the weight he had before. No fevers . No nocturnal diaphoresis. Denies chest pain. Walking more. No abdominal pain or diarrhea. Persists with edema but less. Has been having nocturia. On exam alert and conversant oriented and in no distress. No jaundice. The lungs are clear and the heart is regular. Abdomen is soft and not tender. No edema. No skin rash. The laboratory exams were reviewed and discussed with him. To continue observation. He persists with cytopenias but not worse than before.     Past Medical History:   Diagnosis Date    B12 deficiency     Chronic fatigue     Dizziness     Elevated LFTs     Fatty liver     NON ALCOHOLIC    GERD (gastroesophageal reflux disease)     Gout     Headache     HSV  infection     Hyperglycemia     Hyperlipidemia     Hypertension     Obesity     Obstructive sleep apnea     Onychomycosis     Pain in joint, multiple sites     Sebaceous cyst     Skin nodule     OF ARM, LEFT    Snoring     Vision changes      Past Surgical History:   Procedure Laterality Date    APPENDECTOMY         Current Outpatient Medications:     clobetasol propionate (TEMOVATE) 0.05 % cream, Apply 1 Application topically to the appropriate area as directed 2 (Two) Times a Day., Disp: 30 g, Rfl: 2    Docusate Sodium (COLACE PO), Per instructions 2 TIMES DAILY (route: oral), Disp: , Rfl:     gabapentin (NEURONTIN) 400 MG capsule, TAKE 1 CAPSULE BY MOUTH 3 TIMES A DAY, Disp: 90 capsule, Rfl: 4    Gas-X Ultra Strength 180 MG capsule, , Disp: , Rfl:     HYDROcodone-acetaminophen (NORCO) 5-325 MG per tablet, Take 1 tablet by mouth Every 6 (Six) Hours As Needed for Severe Pain., Disp: 60 tablet, Rfl: 0    hydrOXYzine (ATARAX) 25 MG tablet, TAKE 1 TABLET BY MOUTH 3 TIMES A DAY AS NEEDED FOR ITCHING, Disp: 90 tablet, Rfl: 0    meloxicam (Mobic) 15 MG tablet, Take 1 tablet by mouth Daily., Disp: 90 tablet, Rfl: 1    omeprazole (priLOSEC) 40 MG capsule, Take 1 capsule by mouth Daily., Disp: 90 capsule, Rfl: 1    spironolactone (ALDACTONE) 50 MG tablet, Take 1 tablet by mouth Daily As Needed FOR SWELLING, Disp: 90 tablet, Rfl: 1    Allergies   Allergen Reactions    Ozempic (0.25 Or 0.5 Mg-Dose) [Semaglutide(0.25 Or 0.5mg-Dos)] GI Intolerance    Sulfa Antibiotics Swelling and Rash     Rash, swelling, tingling, peeling.     Family History   Problem Relation Age of Onset    Sleep apnea Father     COPD Father     Other Brother         Desmoid tumor of the abdomen    Hypertension Other     Rheum arthritis Other      Cancer-related family history is not on file.    Social History     Tobacco Use    Smoking status: Never    Smokeless tobacco: Never   Vaping Use    Vaping status: Never Used   Substance Use Topics    Alcohol use:  No    Drug use: No     Social History     Social History Narrative    Not on file      ROS:     Review of Systems   Constitutional:  Positive for fatigue. Negative for activity change, appetite change, chills, diaphoresis, fever and unexpected weight change.   HENT:  Negative for congestion, dental problem, drooling, ear discharge, ear pain, facial swelling, hearing loss, mouth sores, nosebleeds, postnasal drip, rhinorrhea, sinus pressure, sinus pain, sneezing, sore throat, tinnitus, trouble swallowing and voice change.    Eyes:  Negative for photophobia, pain, discharge, redness, itching and visual disturbance.   Respiratory:  Negative for apnea, cough, choking, chest tightness, shortness of breath, wheezing and stridor.    Cardiovascular:  Negative for chest pain, palpitations and leg swelling.   Gastrointestinal:  Positive for nausea and vomiting. Negative for abdominal distention, abdominal pain, anal bleeding, blood in stool, constipation, diarrhea and rectal pain.   Endocrine: Negative for cold intolerance, heat intolerance, polydipsia and polyuria.   Genitourinary:  Negative for decreased urine volume, difficulty urinating, dysuria, flank pain, frequency, genital sores, hematuria and urgency.   Musculoskeletal:  Negative for arthralgias, back pain, gait problem, joint swelling, myalgias, neck pain and neck stiffness.   Skin:  Negative for color change, pallor and rash.   Neurological:  Positive for weakness. Negative for dizziness, tremors, seizures, syncope, facial asymmetry, speech difficulty, light-headedness, numbness and headaches.   Hematological:  Negative for adenopathy. Does not bruise/bleed easily.   Psychiatric/Behavioral:  Negative for agitation, behavioral problems, confusion, decreased concentration, hallucinations, self-injury, sleep disturbance and suicidal ideas. The patient is not nervous/anxious.      Objective:    Vitals:    11/01/24 1034   BP: 131/80   Pulse: 81   Resp: 14   Temp: 98.4 °F  "(36.9 °C)   SpO2: 98%   Weight: 131 kg (289 lb 6.4 oz)   Height: 162.6 cm (64\")   PainSc: 0-No pain     Body mass index is 49.68 kg/m².  ECOG  (0) Fully active, able to carry on all predisease performance without restriction    Physical Exam:     Physical Exam  Constitutional:       General: He is not in acute distress.     Appearance: He is ill-appearing. He is not toxic-appearing or diaphoretic.      Comments: Well-built, oriented and in no distress but seems ill.   HENT:      Head: Normocephalic and atraumatic.      Right Ear: External ear normal.      Left Ear: External ear normal.      Nose: Nose normal.      Mouth/Throat:      Mouth: Mucous membranes are moist.      Pharynx: Oropharynx is clear.      Comments: Edentulous  Eyes:      General: No scleral icterus.        Right eye: No discharge.         Left eye: No discharge.      Conjunctiva/sclera: Conjunctivae normal.      Pupils: Pupils are equal, round, and reactive to light.   Cardiovascular:      Rate and Rhythm: Normal rate and regular rhythm.      Pulses: Normal pulses.      Heart sounds: Normal heart sounds. No murmur heard.     No friction rub. No gallop.   Pulmonary:      Effort: No respiratory distress.      Breath sounds: No stridor. No wheezing, rhonchi or rales.   Chest:      Chest wall: No tenderness.   Abdominal:      General: Bowel sounds are normal. There is no distension.      Palpations: Abdomen is soft. There is no mass.      Tenderness: There is no abdominal tenderness. There is no right CVA tenderness, left CVA tenderness, guarding or rebound.      Comments: Protuberant, soft and not tender. The spleen is not palpable because of the body habitus.    Musculoskeletal:         General: No swelling, tenderness, deformity or signs of injury.      Cervical back: No rigidity.      Right lower leg: No edema.      Left lower leg: No edema.   Lymphadenopathy:      Cervical: No cervical adenopathy.   Skin:     General: Skin is warm and dry.      " Coloration: Skin is not jaundiced.      Findings: No bruising or rash.   Neurological:      General: No focal deficit present.      Mental Status: He is alert and oriented to person, place, and time.      Gait: Gait normal.   Psychiatric:         Mood and Affect: Mood normal.         Behavior: Behavior normal.         Thought Content: Thought content normal.         Judgment: Judgment normal.     DEMAR Greenwood MD performed the physical exam on 11/1/2024 as documented above.     Lab Results - Last 18 Months   Lab Units 11/01/24  1022 07/26/24  1343 07/02/24  0744   WBC 10*3/mm3 2.29* 2.32* 2.06*   HEMOGLOBIN g/dL 12.2* 11.1* 11.8*   HEMATOCRIT % 38.5 34.8* 37.0*   PLATELETS 10*3/mm3 136* 127* 138*   MCV fL 80.2 80.7 80.1     Lab Results - Last 18 Months   Lab Units 07/26/24  1343 06/19/24  1421 05/06/24  0956   SODIUM mmol/L 137 136 136   POTASSIUM mmol/L 4.0 4.0 4.3   CHLORIDE mmol/L 103 102 100   CO2 mmol/L 23.4 25.2 26.0   BUN mg/dL 6 7 9   CREATININE mg/dL 0.68* 0.82 0.63*   CALCIUM mg/dL 8.9 8.8 8.7   BILIRUBIN mg/dL 0.4 0.7 0.4   ALK PHOS U/L 109 112 145*   ALT (SGPT) U/L 14 14 9   AST (SGOT) U/L 26 28 21   GLUCOSE mg/dL 145* 133* 74     Lab Results   Component Value Date    GLUCOSE 145 (H) 07/26/2024    BUN 6 07/26/2024    CREATININE 0.68 (L) 07/26/2024    EGFRIFNONA 107 09/16/2021    BCR 8.8 07/26/2024    K 4.0 07/26/2024    CO2 23.4 07/26/2024    CALCIUM 8.9 07/26/2024    ALBUMIN 3.6 07/26/2024    LABIL2 1.2 11/16/2018    AST 26 07/26/2024    ALT 14 07/26/2024     Uric Acid   Date Value Ref Range Status   08/04/2022 4.1 3.4 - 7.0 mg/dL Final     Assessment & Plan     Assessment and Plan:  Leukopenia and neutropenia: Without much change. The splenomegaly is probably the explanation.   Thrombocytopenia: Persists. As above. For now to continue to follow.   Steatohepatitis and cirrhosis: Continues to follow with gastroenterology.   He will return to see me in approximately 6 months.     Sravan Greenwood MD on  11/1/2024 at 12:57 PM.

## 2024-11-15 ENCOUNTER — OFFICE (AMBULATORY)
Age: 44
End: 2024-11-15
Payer: COMMERCIAL

## 2024-11-15 ENCOUNTER — OFFICE (AMBULATORY)
Dept: URBAN - METROPOLITAN AREA CLINIC 64 | Facility: CLINIC | Age: 44
End: 2024-11-15
Payer: COMMERCIAL

## 2024-11-15 VITALS
SYSTOLIC BLOOD PRESSURE: 140 MMHG | WEIGHT: 298 LBS | HEIGHT: 64 IN | DIASTOLIC BLOOD PRESSURE: 65 MMHG | WEIGHT: 298 LBS | HEART RATE: 80 BPM | DIASTOLIC BLOOD PRESSURE: 71 MMHG | DIASTOLIC BLOOD PRESSURE: 71 MMHG | DIASTOLIC BLOOD PRESSURE: 71 MMHG | SYSTOLIC BLOOD PRESSURE: 140 MMHG | SYSTOLIC BLOOD PRESSURE: 140 MMHG | DIASTOLIC BLOOD PRESSURE: 65 MMHG | WEIGHT: 298 LBS | SYSTOLIC BLOOD PRESSURE: 140 MMHG | HEIGHT: 64 IN | DIASTOLIC BLOOD PRESSURE: 71 MMHG | HEIGHT: 64 IN | SYSTOLIC BLOOD PRESSURE: 140 MMHG | HEART RATE: 80 BPM | WEIGHT: 298 LBS | WEIGHT: 298 LBS | HEART RATE: 80 BPM | DIASTOLIC BLOOD PRESSURE: 71 MMHG | HEIGHT: 64 IN | HEIGHT: 64 IN | DIASTOLIC BLOOD PRESSURE: 65 MMHG | SYSTOLIC BLOOD PRESSURE: 140 MMHG | SYSTOLIC BLOOD PRESSURE: 131 MMHG | SYSTOLIC BLOOD PRESSURE: 131 MMHG | HEART RATE: 80 BPM | DIASTOLIC BLOOD PRESSURE: 65 MMHG | HEIGHT: 64 IN | HEART RATE: 80 BPM | SYSTOLIC BLOOD PRESSURE: 131 MMHG | DIASTOLIC BLOOD PRESSURE: 65 MMHG | SYSTOLIC BLOOD PRESSURE: 140 MMHG | HEART RATE: 80 BPM | HEIGHT: 64 IN | DIASTOLIC BLOOD PRESSURE: 71 MMHG | DIASTOLIC BLOOD PRESSURE: 65 MMHG | DIASTOLIC BLOOD PRESSURE: 71 MMHG | SYSTOLIC BLOOD PRESSURE: 131 MMHG | WEIGHT: 298 LBS | SYSTOLIC BLOOD PRESSURE: 131 MMHG | SYSTOLIC BLOOD PRESSURE: 131 MMHG | SYSTOLIC BLOOD PRESSURE: 131 MMHG | WEIGHT: 298 LBS | DIASTOLIC BLOOD PRESSURE: 65 MMHG | HEART RATE: 80 BPM

## 2024-11-15 DIAGNOSIS — R53.83 OTHER FATIGUE: ICD-10-CM

## 2024-11-15 DIAGNOSIS — K74.69 OTHER CIRRHOSIS OF LIVER: ICD-10-CM

## 2024-11-15 DIAGNOSIS — R13.10 DYSPHAGIA, UNSPECIFIED: ICD-10-CM

## 2024-11-15 PROCEDURE — 99213 OFFICE O/P EST LOW 20 MIN: CPT | Performed by: NURSE PRACTITIONER

## 2024-12-11 DIAGNOSIS — Z90.49 S/P APPENDECTOMY: ICD-10-CM

## 2024-12-11 DIAGNOSIS — R20.2 NUMBNESS AND TINGLING IN BOTH HANDS: ICD-10-CM

## 2024-12-11 DIAGNOSIS — R20.0 NUMBNESS AND TINGLING IN BOTH HANDS: ICD-10-CM

## 2024-12-12 RX ORDER — HYDROCODONE BITARTRATE AND ACETAMINOPHEN 5; 325 MG/1; MG/1
1 TABLET ORAL EVERY 6 HOURS PRN
Qty: 60 TABLET | Refills: 0 | Status: SHIPPED | OUTPATIENT
Start: 2024-12-12

## 2024-12-16 ENCOUNTER — PRIOR AUTHORIZATION (OUTPATIENT)
Dept: FAMILY MEDICINE CLINIC | Facility: CLINIC | Age: 44
End: 2024-12-16
Payer: COMMERCIAL

## 2024-12-16 NOTE — TELEPHONE ENCOUNTER
PA Hydrocodone submitted on Covermymeds (Key: [id number])  PA Case ID #: [id number]  Rx #: [id number]

## 2024-12-16 NOTE — TELEPHONE ENCOUNTER
PA Approved  PA Case: [id number], Status: Approved, Coverage Starts on: 12/16/2024 12:00:00 AM, Coverage Ends on: 3/16/2025 12:00:00 AM.  Authorization Expiration Date: 3/15/2025

## 2024-12-20 RX ORDER — HYDROXYZINE HYDROCHLORIDE 25 MG/1
TABLET, FILM COATED ORAL
Qty: 90 TABLET | Refills: 0 | Status: SHIPPED | OUTPATIENT
Start: 2024-12-20

## 2025-01-16 ENCOUNTER — OFFICE VISIT (OUTPATIENT)
Dept: FAMILY MEDICINE CLINIC | Facility: CLINIC | Age: 45
End: 2025-01-16
Payer: COMMERCIAL

## 2025-01-16 ENCOUNTER — LAB (OUTPATIENT)
Dept: FAMILY MEDICINE CLINIC | Facility: CLINIC | Age: 45
End: 2025-01-16
Payer: COMMERCIAL

## 2025-01-16 ENCOUNTER — TELEPHONE (OUTPATIENT)
Dept: FAMILY MEDICINE CLINIC | Facility: CLINIC | Age: 45
End: 2025-01-16

## 2025-01-16 VITALS
OXYGEN SATURATION: 97 % | WEIGHT: 297.4 LBS | DIASTOLIC BLOOD PRESSURE: 75 MMHG | HEART RATE: 73 BPM | SYSTOLIC BLOOD PRESSURE: 131 MMHG | HEIGHT: 64 IN | BODY MASS INDEX: 50.77 KG/M2

## 2025-01-16 DIAGNOSIS — D69.6 THROMBOCYTOPENIA: ICD-10-CM

## 2025-01-16 DIAGNOSIS — R79.89 ELEVATED LFTS: ICD-10-CM

## 2025-01-16 DIAGNOSIS — R20.0 NUMBNESS AND TINGLING IN BOTH HANDS: ICD-10-CM

## 2025-01-16 DIAGNOSIS — L29.9 PRURITUS: ICD-10-CM

## 2025-01-16 DIAGNOSIS — Z23 NEEDS FLU SHOT: ICD-10-CM

## 2025-01-16 DIAGNOSIS — E78.2 MIXED HYPERLIPIDEMIA: ICD-10-CM

## 2025-01-16 DIAGNOSIS — M62.838 MUSCLE SPASM: ICD-10-CM

## 2025-01-16 DIAGNOSIS — R73.09 ABNORMAL GLUCOSE: ICD-10-CM

## 2025-01-16 DIAGNOSIS — K21.9 GASTROESOPHAGEAL REFLUX DISEASE WITHOUT ESOPHAGITIS: ICD-10-CM

## 2025-01-16 DIAGNOSIS — R60.0 BILATERAL LOWER EXTREMITY EDEMA: ICD-10-CM

## 2025-01-16 DIAGNOSIS — E66.813 CLASS 3 SEVERE OBESITY WITHOUT SERIOUS COMORBIDITY WITH BODY MASS INDEX (BMI) OF 50.0 TO 59.9 IN ADULT, UNSPECIFIED OBESITY TYPE: ICD-10-CM

## 2025-01-16 DIAGNOSIS — G47.33 OBSTRUCTIVE SLEEP APNEA: ICD-10-CM

## 2025-01-16 DIAGNOSIS — K75.81 NASH (NONALCOHOLIC STEATOHEPATITIS): Primary | ICD-10-CM

## 2025-01-16 DIAGNOSIS — D72.829 LEUKOCYTOSIS, UNSPECIFIED TYPE: ICD-10-CM

## 2025-01-16 DIAGNOSIS — R20.2 NUMBNESS AND TINGLING IN BOTH HANDS: ICD-10-CM

## 2025-01-16 DIAGNOSIS — M25.50 MULTIPLE JOINT PAIN: ICD-10-CM

## 2025-01-16 DIAGNOSIS — E66.01 CLASS 3 SEVERE OBESITY WITHOUT SERIOUS COMORBIDITY WITH BODY MASS INDEX (BMI) OF 50.0 TO 59.9 IN ADULT, UNSPECIFIED OBESITY TYPE: ICD-10-CM

## 2025-01-16 LAB — HBA1C MFR BLD: 5.7 % (ref 4.8–5.6)

## 2025-01-16 PROCEDURE — 83036 HEMOGLOBIN GLYCOSYLATED A1C: CPT | Performed by: NURSE PRACTITIONER

## 2025-01-16 PROCEDURE — 80061 LIPID PANEL: CPT | Performed by: NURSE PRACTITIONER

## 2025-01-16 PROCEDURE — 80050 GENERAL HEALTH PANEL: CPT | Performed by: NURSE PRACTITIONER

## 2025-01-16 PROCEDURE — 99396 PREV VISIT EST AGE 40-64: CPT | Performed by: NURSE PRACTITIONER

## 2025-01-16 PROCEDURE — 90471 IMMUNIZATION ADMIN: CPT | Performed by: NURSE PRACTITIONER

## 2025-01-16 PROCEDURE — 36415 COLL VENOUS BLD VENIPUNCTURE: CPT | Performed by: NURSE PRACTITIONER

## 2025-01-16 PROCEDURE — 90656 IIV3 VACC NO PRSV 0.5 ML IM: CPT | Performed by: NURSE PRACTITIONER

## 2025-01-16 RX ORDER — OMEPRAZOLE 40 MG/1
40 CAPSULE, DELAYED RELEASE ORAL DAILY
Qty: 90 CAPSULE | Refills: 1 | Status: SHIPPED | OUTPATIENT
Start: 2025-01-16

## 2025-01-16 RX ORDER — SEMAGLUTIDE 0.68 MG/ML
0.25 INJECTION, SOLUTION SUBCUTANEOUS WEEKLY
Qty: 3 ML | Refills: 0 | Status: SHIPPED | OUTPATIENT
Start: 2025-01-16

## 2025-01-16 RX ORDER — TIZANIDINE 2 MG/1
2 TABLET ORAL 2 TIMES DAILY PRN
Qty: 40 TABLET | Refills: 2 | Status: SHIPPED | OUTPATIENT
Start: 2025-01-16

## 2025-01-16 RX ORDER — GABAPENTIN 400 MG/1
400 CAPSULE ORAL 3 TIMES DAILY
Qty: 90 CAPSULE | Refills: 5 | Status: SHIPPED | OUTPATIENT
Start: 2025-01-16

## 2025-01-16 RX ORDER — SPIRONOLACTONE 50 MG/1
50 TABLET, FILM COATED ORAL DAILY
Qty: 90 TABLET | Refills: 1 | Status: SHIPPED | OUTPATIENT
Start: 2025-01-16

## 2025-01-16 RX ORDER — HYDROXYZINE HYDROCHLORIDE 25 MG/1
25 TABLET, FILM COATED ORAL EVERY 8 HOURS PRN
Qty: 270 TABLET | Refills: 1 | Status: SHIPPED | OUTPATIENT
Start: 2025-01-16

## 2025-01-16 NOTE — PROGRESS NOTES
Chief Complaint  Chief Complaint   Patient presents with    Annual Exam     Pt would like to discuss weight gain, leg swelling, muscle cramps.            Subjective          Farhad Khan presents to [facility] for   History of Present Illness    44-year-old male patient presents for annual exam    Patient following with oncology/hematology for thrombocytopenia, neutropenia and leukocytosis. 7/2/24 underwent bone marrow biopsy.      DEGROOT, with splenomegaly and leukopenia, he is following with hematology and gastro. He underwent appendectomy on 10/10/2023, course was complicated. He complains of itching, worse at night, takes hydroxyzine 3 times daily.  Numbness and tingling of both hands/BLE unchanged, he is on gabapentin and using norco most days BID. He is tolerating a regular diet, has good appetite. Recommended by Dr. Pizano if he continues to have gas and bloating to consider cholecystectomy.    -On 1/2/2024 he underwent ERCP, esophageal dilation and biliary sphincterotomy of the common bile duct per Dr. Pizano.    -He was on ozempic through a research study with gastro for DEGROOT, low dose did well with weight loss but higher dose could not tolerate d/t nausea. Gained weight back now and would like to discuss resuming      He c/o pain/stiffness/aching in the hands, the pins needle sensation of BLE has improved. He is not currently going to the gym, but still trying to get 3k steps in daily.  Gait and weakness have improved, feels more steady on his feet.      HTN, off lisinopril now, taking only spironolactone. Feels stable on meds and takes as directed, denies chest pain, headache, shortness of air, palpitations and does c/o swelling of lower extremities L>R, he works on a computer uses stationary bike under desk, but is otherwise fairly stationary    Reports muscle cramping of legs, random spasms with reaching or movement, feels ansey, has to move through the night which  causes him to not sleep well. He tried his wifes zanaflex and helped.          The following portions of the patient's history were reviewed and updated as appropriate: allergies, current medications, past family history, past medical history, past social history, past surgical history and problem list.    Past Medical History:   Diagnosis Date    B12 deficiency     Chronic fatigue     Dizziness     Elevated LFTs     Fatty liver     GERD (gastroesophageal reflux disease)     Gout     Headache     HSV infection     Hyperglycemia     Hyperlipidemia     Hypertension     Obesity     Obstructive sleep apnea     Onychomycosis     Pain in joint, multiple sites     Sebaceous cyst     Skin nodule     Snoring     Vision changes      Past Surgical History:   Procedure Laterality Date    APPENDECTOMY       Family History   Problem Relation Age of Onset    Sleep apnea Father     COPD Father     Other Brother         Desmoid tumor of the abdomen    Hypertension Other     Rheum arthritis Other      Social History     Tobacco Use    Smoking status: Never    Smokeless tobacco: Never   Substance Use Topics    Alcohol use: No       Current Outpatient Medications:     gabapentin (NEURONTIN) 400 MG capsule, Take 1 capsule by mouth 3 (Three) Times a Day., Disp: 90 capsule, Rfl: 5    Gas-X Ultra Strength 180 MG capsule, , Disp: , Rfl:     HYDROcodone-acetaminophen (NORCO) 5-325 MG per tablet, Take 1 tablet by mouth Every 6 (Six) Hours As Needed for Severe Pain., Disp: 60 tablet, Rfl: 0    hydrOXYzine (ATARAX) 25 MG tablet, Take 1 tablet by mouth Every 8 (Eight) Hours As Needed for Itching., Disp: 270 tablet, Rfl: 1    omeprazole (priLOSEC) 40 MG capsule, Take 1 capsule by mouth Daily., Disp: 90 capsule, Rfl: 1    spironolactone (ALDACTONE) 50 MG tablet, Take 1 tablet by mouth Daily. swelling, Disp: 90 tablet, Rfl: 1    Semaglutide,0.25 or 0.5MG/DOS, (Ozempic, 0.25 or 0.5 MG/DOSE,) 2 MG/3ML solution pen-injector, Inject 0.25 mg under the  "skin into the appropriate area as directed 1 (One) Time Per Week., Disp: 3 mL, Rfl: 0    tiZANidine (ZANAFLEX) 2 MG tablet, Take 1 tablet by mouth 2 (Two) Times a Day As Needed for Muscle Spasms., Disp: 40 tablet, Rfl: 2    Objective   Vital Signs:   Vitals:    01/16/25 0815   BP: 131/75   BP Location: Left arm   Patient Position: Sitting   Cuff Size: Large Adult   Pulse: 73   SpO2: 97%   Weight: 135 kg (297 lb 6.4 oz)   Height: 162.6 cm (64\")   PainSc: 0-No pain       Body mass index is 51.05 kg/m².    Physical Exam  Constitutional:       General: He is not in acute distress.     Appearance: Normal appearance. He is well-developed. He is obese. He is not ill-appearing or diaphoretic.   HENT:      Head: Normocephalic.   Eyes:      Conjunctiva/sclera: Conjunctivae normal.      Pupils: Pupils are equal, round, and reactive to light.   Neck:      Thyroid: No thyromegaly.      Vascular: No JVD.   Cardiovascular:      Rate and Rhythm: Normal rate and regular rhythm.      Heart sounds: Normal heart sounds. No murmur heard.  Pulmonary:      Effort: Pulmonary effort is normal. No respiratory distress.      Breath sounds: Normal breath sounds. No wheezing or rhonchi.   Abdominal:      General: Bowel sounds are normal. There is distension (firm and round).      Palpations: Abdomen is soft. There is no mass.      Tenderness: There is no abdominal tenderness.      Hernia: No hernia is present.   Musculoskeletal:         General: Swelling (+2 pitting BLE) present. No tenderness. Normal range of motion.      Cervical back: Normal range of motion and neck supple. No tenderness.   Lymphadenopathy:      Cervical: No cervical adenopathy.   Skin:     General: Skin is warm and dry.      Coloration: Skin is not jaundiced.      Findings: No erythema or rash.   Neurological:      General: No focal deficit present.      Mental Status: He is alert and oriented to person, place, and time. Mental status is at baseline.      Sensory: Sensory " deficit (hands and feet) present.      Motor: No weakness.      Gait: Gait abnormal.   Psychiatric:         Mood and Affect: Mood normal.         Behavior: Behavior normal.         Thought Content: Thought content normal.         Judgment: Judgment normal.          Result Review :     No visits with results within 7 Day(s) from this visit.   Latest known visit with results is:   Lab on 11/01/2024   Component Date Value Ref Range Status    Glucose 11/01/2024 67  65 - 99 mg/dL Final    BUN 11/01/2024 5 (L)  6 - 20 mg/dL Final    Creatinine 11/01/2024 0.82  0.76 - 1.27 mg/dL Final    Sodium 11/01/2024 137  136 - 145 mmol/L Final    Potassium 11/01/2024 4.0  3.5 - 5.2 mmol/L Final    Chloride 11/01/2024 102  98 - 107 mmol/L Final    CO2 11/01/2024 26.2  22.0 - 29.0 mmol/L Final    Calcium 11/01/2024 9.1  8.6 - 10.5 mg/dL Final    Total Protein 11/01/2024 7.3  6.0 - 8.5 g/dL Final    Albumin 11/01/2024 3.7  3.5 - 5.2 g/dL Final    ALT (SGPT) 11/01/2024 11  1 - 41 U/L Final    AST (SGOT) 11/01/2024 29  1 - 40 U/L Final    Alkaline Phosphatase 11/01/2024 124 (H)  39 - 117 U/L Final    Total Bilirubin 11/01/2024 0.6  0.0 - 1.2 mg/dL Final    Globulin 11/01/2024 3.6  gm/dL Final    A/G Ratio 11/01/2024 1.0  g/dL Final    BUN/Creatinine Ratio 11/01/2024 6.1 (L)  7.0 - 25.0 Final    Anion Gap 11/01/2024 8.8  5.0 - 15.0 mmol/L Final    eGFR 11/01/2024 109.7  >60.0 mL/min/1.73 Final    WBC 11/01/2024 2.29 (L)  3.40 - 10.80 10*3/mm3 Final    RBC 11/01/2024 4.80  4.14 - 5.80 10*6/mm3 Final    Hemoglobin 11/01/2024 12.2 (L)  13.0 - 17.7 g/dL Final    Hematocrit 11/01/2024 38.5  37.5 - 51.0 % Final    MCV 11/01/2024 80.2  79.0 - 97.0 fL Final    MCH 11/01/2024 25.4 (L)  26.6 - 33.0 pg Final    MCHC 11/01/2024 31.7  31.5 - 35.7 g/dL Final    RDW 11/01/2024 17.6 (H)  12.3 - 15.4 % Final    RDW-SD 11/01/2024 50.6  37.0 - 54.0 fl Final    MPV 11/01/2024 9.3  6.0 - 12.0 fL Final    Platelets 11/01/2024 136 (L)  140 - 450 10*3/mm3 Final     Neutrophil % 11/01/2024 53.2  42.7 - 76.0 % Final    Lymphocyte % 11/01/2024 34.1  19.6 - 45.3 % Final    Monocyte % 11/01/2024 12.7 (H)  5.0 - 12.0 % Final    Eosinophil % 11/01/2024 0.0 (L)  0.3 - 6.2 % Final    Basophil % 11/01/2024 0.0  0.0 - 1.5 % Final    Neutrophils, Absolute 11/01/2024 1.22 (L)  1.70 - 7.00 10*3/mm3 Final    Lymphocytes, Absolute 11/01/2024 0.78  0.70 - 3.10 10*3/mm3 Final    Monocytes, Absolute 11/01/2024 0.29  0.10 - 0.90 10*3/mm3 Final    Eosinophils, Absolute 11/01/2024 0.00  0.00 - 0.40 10*3/mm3 Final    Basophils, Absolute 11/01/2024 0.00  0.00 - 0.20 10*3/mm3 Final    Extra Tube 11/01/2024 Hold for add-ons.   Final    Auto resulted.                              Assessment and Plan    Diagnoses and all orders for this visit:    1. DEGROOT (nonalcoholic steatohepatitis) (Primary)  -     Semaglutide,0.25 or 0.5MG/DOS, (Ozempic, 0.25 or 0.5 MG/DOSE,) 2 MG/3ML solution pen-injector; Inject 0.25 mg under the skin into the appropriate area as directed 1 (One) Time Per Week.  Dispense: 3 mL; Refill: 0    2. Mixed hyperlipidemia  -     Hemoglobin A1c  -     Lipid Panel  -     Comprehensive Metabolic Panel    3. Leukocytosis, unspecified type    4. Thrombocytopenia  -     CBC (No Diff)    5. Bilateral lower extremity edema  -     Hemoglobin A1c  -     Lipid Panel  -     Comprehensive Metabolic Panel  -     CBC (No Diff)  -     TSH  -     spironolactone (ALDACTONE) 50 MG tablet; Take 1 tablet by mouth Daily. swelling  Dispense: 90 tablet; Refill: 1    6. Muscle spasm  -     tiZANidine (ZANAFLEX) 2 MG tablet; Take 1 tablet by mouth 2 (Two) Times a Day As Needed for Muscle Spasms.  Dispense: 40 tablet; Refill: 2    7. Class 3 severe obesity without serious comorbidity with body mass index (BMI) of 50.0 to 59.9 in adult, unspecified obesity type  -     Semaglutide,0.25 or 0.5MG/DOS, (Ozempic, 0.25 or 0.5 MG/DOSE,) 2 MG/3ML solution pen-injector; Inject 0.25 mg under the skin into the appropriate  area as directed 1 (One) Time Per Week.  Dispense: 3 mL; Refill: 0    8. Gastroesophageal reflux disease without esophagitis  -     omeprazole (priLOSEC) 40 MG capsule; Take 1 capsule by mouth Daily.  Dispense: 90 capsule; Refill: 1    9. Obstructive sleep apnea    10. Elevated LFTs  -     Comprehensive Metabolic Panel    11. Abnormal glucose  -     Hemoglobin A1c    12. Multiple joint pain    13. Needs flu shot  -     Fluzone >6mos ([id number])    14. Numbness and tingling in both hands  -     gabapentin (NEURONTIN) 400 MG capsule; Take 1 capsule by mouth 3 (Three) Times a Day.  Dispense: 90 capsule; Refill: 5    15. Pruritus  -     hydrOXYzine (ATARAX) 25 MG tablet; Take 1 tablet by mouth Every 8 (Eight) Hours As Needed for Itching.  Dispense: 270 tablet; Refill: 1      Trial of tizanidine, Try to decrease use of Norco, continue gabapentin  Labs today as ordered, notify results  Follow-up with gastro as directed  Patient is feeling more like himself, appetite is back to normal but over-eating.  Recommend smaller portions increase exercise, recommend stretching, yoga or light weights.    Will try for ozempic again, start low dose.   Decrease salt in diet, increase water intake, elevate legs when at rest, continue compression stockings  Follow-up with hematology as directed      I spent 45 minutes caring for Farhad Khan on this date of service. This time includes time spent by me in the following activities: preparing for the visit, reviewing tests, performing a medically appropriate examination and/or evaluation , counseling and educating the patient/family/caregiver, ordering medications, tests, or procedures and documenting information in the medical record        Follow Up     Return in about 6 months (around 7/16/2025) for Recheck.  Patient was given instructions and counseling regarding his condition or for health maintenance advice. Please see specific information pulled into the AVS if  appropriate.        Part of this note may be an electronic transcription/translation of spoken language to printed text using the Dragon Dictation System

## 2025-01-17 LAB
ALBUMIN SERPL-MCNC: 3.7 G/DL (ref 3.5–5.2)
ALBUMIN/GLOB SERPL: 1 G/DL
ALP SERPL-CCNC: 118 U/L (ref 39–117)
ALT SERPL W P-5'-P-CCNC: 18 U/L (ref 1–41)
ANION GAP SERPL CALCULATED.3IONS-SCNC: 11 MMOL/L (ref 5–15)
AST SERPL-CCNC: 29 U/L (ref 1–40)
BILIRUB SERPL-MCNC: 0.7 MG/DL (ref 0–1.2)
BUN SERPL-MCNC: 8 MG/DL (ref 6–20)
BUN/CREAT SERPL: 10.3 (ref 7–25)
CALCIUM SPEC-SCNC: 9.1 MG/DL (ref 8.6–10.5)
CHLORIDE SERPL-SCNC: 99 MMOL/L (ref 98–107)
CHOLEST SERPL-MCNC: 176 MG/DL (ref 0–200)
CO2 SERPL-SCNC: 26 MMOL/L (ref 22–29)
CREAT SERPL-MCNC: 0.78 MG/DL (ref 0.76–1.27)
DEPRECATED RDW RBC AUTO: 41.3 FL (ref 37–54)
EGFRCR SERPLBLD CKD-EPI 2021: 111.4 ML/MIN/1.73
ERYTHROCYTE [DISTWIDTH] IN BLOOD BY AUTOMATED COUNT: 14.7 % (ref 12.3–15.4)
GLOBULIN UR ELPH-MCNC: 3.6 GM/DL
GLUCOSE SERPL-MCNC: 103 MG/DL (ref 65–99)
HCT VFR BLD AUTO: 39.1 % (ref 37.5–51)
HDLC SERPL-MCNC: 41 MG/DL (ref 40–60)
HGB BLD-MCNC: 12.7 G/DL (ref 13–17.7)
LDLC SERPL CALC-MCNC: 119 MG/DL (ref 0–100)
LDLC/HDLC SERPL: 2.86 {RATIO}
MCH RBC QN AUTO: 25.6 PG (ref 26.6–33)
MCHC RBC AUTO-ENTMCNC: 32.5 G/DL (ref 31.5–35.7)
MCV RBC AUTO: 78.8 FL (ref 79–97)
PLATELET # BLD AUTO: 162 10*3/MM3 (ref 140–450)
PMV BLD AUTO: 10.5 FL (ref 6–12)
POTASSIUM SERPL-SCNC: 4.6 MMOL/L (ref 3.5–5.2)
PROT SERPL-MCNC: 7.3 G/DL (ref 6–8.5)
RBC # BLD AUTO: 4.96 10*6/MM3 (ref 4.14–5.8)
SODIUM SERPL-SCNC: 136 MMOL/L (ref 136–145)
TRIGL SERPL-MCNC: 88 MG/DL (ref 0–150)
TSH SERPL DL<=0.05 MIU/L-ACNC: 1.53 UIU/ML (ref 0.27–4.2)
VLDLC SERPL-MCNC: 16 MG/DL (ref 5–40)
WBC NRBC COR # BLD AUTO: 2.57 10*3/MM3 (ref 3.4–10.8)

## 2025-01-20 ENCOUNTER — TELEPHONE (OUTPATIENT)
Dept: FAMILY MEDICINE CLINIC | Facility: CLINIC | Age: 45
End: 2025-01-20

## 2025-01-20 NOTE — TELEPHONE ENCOUNTER
Caller: [id number] - [facility] [place] - [address] - [phone]  - [phone] FX    Relationship: Pharmacy    Best call back number: [phone]    Which medication are you concerned about: OZEMPIC          What are your concerns: NEED TO HAVE DIFFERENT DIAGNOSIS CODES, THE ONES SENT ARE NOT COVERED BY THE INSURANCE, OR TRY THE PA THAT WAS SENT

## 2025-01-22 DIAGNOSIS — E66.01 CLASS 3 SEVERE OBESITY WITHOUT SERIOUS COMORBIDITY WITH BODY MASS INDEX (BMI) OF 50.0 TO 59.9 IN ADULT, UNSPECIFIED OBESITY TYPE: ICD-10-CM

## 2025-01-22 DIAGNOSIS — E78.2 MIXED HYPERLIPIDEMIA: ICD-10-CM

## 2025-01-22 DIAGNOSIS — K75.81 NASH (NONALCOHOLIC STEATOHEPATITIS): Primary | ICD-10-CM

## 2025-01-22 DIAGNOSIS — E66.813 CLASS 3 SEVERE OBESITY WITHOUT SERIOUS COMORBIDITY WITH BODY MASS INDEX (BMI) OF 50.0 TO 59.9 IN ADULT, UNSPECIFIED OBESITY TYPE: ICD-10-CM

## 2025-01-22 RX ORDER — SEMAGLUTIDE 0.25 MG/.5ML
0.25 INJECTION, SOLUTION SUBCUTANEOUS WEEKLY
Qty: 2 ML | Refills: 0 | Status: SHIPPED | OUTPATIENT
Start: 2025-01-22

## 2025-02-01 DIAGNOSIS — E66.01 CLASS 3 SEVERE OBESITY WITHOUT SERIOUS COMORBIDITY WITH BODY MASS INDEX (BMI) OF 50.0 TO 59.9 IN ADULT, UNSPECIFIED OBESITY TYPE: ICD-10-CM

## 2025-02-01 DIAGNOSIS — E66.813 CLASS 3 SEVERE OBESITY WITHOUT SERIOUS COMORBIDITY WITH BODY MASS INDEX (BMI) OF 50.0 TO 59.9 IN ADULT, UNSPECIFIED OBESITY TYPE: ICD-10-CM

## 2025-02-01 DIAGNOSIS — K75.81 NASH (NONALCOHOLIC STEATOHEPATITIS): ICD-10-CM

## 2025-02-01 DIAGNOSIS — E78.2 MIXED HYPERLIPIDEMIA: ICD-10-CM

## 2025-02-01 RX ORDER — SEMAGLUTIDE 0.25 MG/.5ML
0.25 INJECTION, SOLUTION SUBCUTANEOUS WEEKLY
Qty: 2 ML | Refills: 0 | Status: SHIPPED | OUTPATIENT
Start: 2025-02-01

## 2025-02-04 ENCOUNTER — PATIENT MESSAGE (OUTPATIENT)
Dept: FAMILY MEDICINE CLINIC | Facility: CLINIC | Age: 45
End: 2025-02-04
Payer: COMMERCIAL

## 2025-02-07 DIAGNOSIS — R20.0 NUMBNESS AND TINGLING IN BOTH HANDS: ICD-10-CM

## 2025-02-07 DIAGNOSIS — R20.2 NUMBNESS AND TINGLING IN BOTH HANDS: ICD-10-CM

## 2025-02-07 DIAGNOSIS — Z90.49 S/P APPENDECTOMY: ICD-10-CM

## 2025-02-07 RX ORDER — HYDROCODONE BITARTRATE AND ACETAMINOPHEN 5; 325 MG/1; MG/1
1 TABLET ORAL EVERY 6 HOURS PRN
Qty: 60 TABLET | Refills: 0 | Status: SHIPPED | OUTPATIENT
Start: 2025-02-07

## 2025-02-24 ENCOUNTER — PATIENT ROUNDING (BHMG ONLY) (OUTPATIENT)
Dept: URGENT CARE | Facility: CLINIC | Age: 45
End: 2025-02-24
Payer: COMMERCIAL

## 2025-02-24 DIAGNOSIS — E66.813 CLASS 3 SEVERE OBESITY WITHOUT SERIOUS COMORBIDITY WITH BODY MASS INDEX (BMI) OF 50.0 TO 59.9 IN ADULT, UNSPECIFIED OBESITY TYPE: Primary | ICD-10-CM

## 2025-02-24 DIAGNOSIS — E66.01 CLASS 3 SEVERE OBESITY WITHOUT SERIOUS COMORBIDITY WITH BODY MASS INDEX (BMI) OF 50.0 TO 59.9 IN ADULT, UNSPECIFIED OBESITY TYPE: Primary | ICD-10-CM

## 2025-02-24 NOTE — ED NOTES
Thank you for letting us care for you in your recent visit to our urgent care center. We would love to hear about your experience with us. Was this the first time you have visited our location?    We’re always looking for ways to make our patients’ experiences even better. Do you have any recommendations on ways we may improve?     I appreciate you taking the time to respond. Please be on the lookout for a survey about your recent visit from [employer] via text or email. We would greatly appreciate if you could fill that out and turn it back in. We want your voice to be heard and we value your feedback.   Thank you for choosing [facility] for your healthcare needs.

## 2025-03-11 DIAGNOSIS — R20.2 NUMBNESS AND TINGLING IN BOTH HANDS: ICD-10-CM

## 2025-03-11 DIAGNOSIS — R20.0 NUMBNESS AND TINGLING IN BOTH HANDS: ICD-10-CM

## 2025-03-11 DIAGNOSIS — Z90.49 S/P APPENDECTOMY: ICD-10-CM

## 2025-03-11 RX ORDER — HYDROCODONE BITARTRATE AND ACETAMINOPHEN 5; 325 MG/1; MG/1
1 TABLET ORAL EVERY 6 HOURS PRN
Qty: 60 TABLET | Refills: 0 | Status: SHIPPED | OUTPATIENT
Start: 2025-03-11

## 2025-03-12 DIAGNOSIS — R20.0 NUMBNESS AND TINGLING IN BOTH HANDS: ICD-10-CM

## 2025-03-12 DIAGNOSIS — R20.2 NUMBNESS AND TINGLING IN BOTH HANDS: ICD-10-CM

## 2025-03-13 RX ORDER — GABAPENTIN 400 MG/1
400 CAPSULE ORAL 3 TIMES DAILY
Qty: 90 CAPSULE | Refills: 5 | Status: SHIPPED | OUTPATIENT
Start: 2025-03-13

## 2025-03-14 ENCOUNTER — OFFICE (AMBULATORY)
Dept: URBAN - METROPOLITAN AREA CLINIC 64 | Facility: CLINIC | Age: 45
End: 2025-03-14
Payer: COMMERCIAL

## 2025-03-14 VITALS
HEIGHT: 64 IN | DIASTOLIC BLOOD PRESSURE: 78 MMHG | WEIGHT: 301 LBS | HEART RATE: 77 BPM | SYSTOLIC BLOOD PRESSURE: 127 MMHG

## 2025-03-14 DIAGNOSIS — R11.0 NAUSEA: ICD-10-CM

## 2025-03-14 DIAGNOSIS — K74.69 OTHER CIRRHOSIS OF LIVER: ICD-10-CM

## 2025-03-14 PROCEDURE — 99213 OFFICE O/P EST LOW 20 MIN: CPT | Performed by: NURSE PRACTITIONER

## 2025-04-08 DIAGNOSIS — E66.813 CLASS 3 SEVERE OBESITY WITHOUT SERIOUS COMORBIDITY WITH BODY MASS INDEX (BMI) OF 50.0 TO 59.9 IN ADULT, UNSPECIFIED OBESITY TYPE: ICD-10-CM

## 2025-04-08 DIAGNOSIS — E66.01 CLASS 3 SEVERE OBESITY WITHOUT SERIOUS COMORBIDITY WITH BODY MASS INDEX (BMI) OF 50.0 TO 59.9 IN ADULT, UNSPECIFIED OBESITY TYPE: ICD-10-CM

## 2025-04-18 DIAGNOSIS — R20.2 NUMBNESS AND TINGLING IN BOTH HANDS: ICD-10-CM

## 2025-04-18 DIAGNOSIS — Z90.49 S/P APPENDECTOMY: ICD-10-CM

## 2025-04-18 DIAGNOSIS — R20.0 NUMBNESS AND TINGLING IN BOTH HANDS: ICD-10-CM

## 2025-04-21 ENCOUNTER — TELEPHONE (OUTPATIENT)
Dept: ONCOLOGY | Facility: CLINIC | Age: 45
End: 2025-04-21
Payer: COMMERCIAL

## 2025-04-21 RX ORDER — HYDROCODONE BITARTRATE AND ACETAMINOPHEN 5; 325 MG/1; MG/1
1 TABLET ORAL
Qty: 60 TABLET | Refills: 0 | Status: SHIPPED | OUTPATIENT
Start: 2025-04-21

## 2025-04-21 NOTE — TELEPHONE ENCOUNTER
Left v/m to r/s appt, due to Dr Roshan [place] [facility]. Please put w/TARAH Lorenz at 115 on 5/6   177.8

## 2025-04-24 DIAGNOSIS — M62.838 MUSCLE SPASM: ICD-10-CM

## 2025-04-25 RX ORDER — TIZANIDINE 2 MG/1
TABLET ORAL
Qty: 40 TABLET | Refills: 0 | Status: SHIPPED | OUTPATIENT
Start: 2025-04-25

## 2025-05-02 NOTE — PROGRESS NOTES
HEMATOLOGY ONCOLOGY OUTPATIENT FOLLOW-UP      Patient name: Farhad Khan  : 1980  MRN: [id number]  Primary Care Physician: Jacqueline Dwyer APRN  Referring Physician: No ref. provider found  Reason For Consult:     Chief Complaint   Patient presents with    Follow-up     Thrombocytopenia     HPI:   History of Present Illness:    9/15/2022: Mr. Khan was referred for the investigation of leukopenia, neutropenia and thrombocytopenia.  He was first noted to have moderate leukopenia approximately 1 year before this visit.  Over the course of several months the leukopenia became somewhat worse and at some point his total white cells were less than 2.0.  Approximately 3 weeks before this visit this had improved and the total white cell count was up to 2.4 and this was associated to moderate neutropenia.  On this basis he was referred.  He had no new symptoms.  For some time, perhaps as many as 3 years, he had been experiencing fatigue and general malaise.  He had not had any changes in his work and he was able to fulfill all his duties.  He had been afebrile and without weight loss.  For at least 2 or 3 years he had maintained a similar weight.  He had been without chest pains or cough.  And denied expectoration or hemoptysis.  No dysphagia.  He had had no abdominal pain and denied diarrhea or dysuria.  He had not experienced any peripheral edema.  No skin rash.  For approximately 2 years he has been taking a combination of medications that included lisinopril, omeprazole and rosuvastatin.    2022: In the office to review the results of his tests.  Felt reasonably well and perhaps better than the previous week he had been much more fatigued at that time.  Eating well.  Reported frequent diarrhea but only intermittently.  Denied fevers.  Had had no chest pains and no increasing dyspnea.  No abdominal pain at the time but did admit to intermittent right upper quadrant pain.  No  edema.  The met laboratory exams were essentially unremarkable though they did confirm leukopenia, neutropenia and thrombocytopenia.  No suggestion of a bone marrow disorder, howeve.  The ultrasound of the abdomen confirmed the presence of a liver with cirrhotic morphology and splenomegaly.  It was felt that the explanation for his cytopenias was this splenomegaly.    11/18/2022: Feeling reasonably well. Continued to work and described being more active than before. Eating well and stable weight, though his wife described that he did not eat enough to justify his weight. Seen by the nurse practitioner at Dr. Knight's office. He was offered a clinical trial but did not offer anything else. The physical exam was unchanged. He persisted with moderate leukopenia and neutropenia, as well as thrombocytopenia. They had not changed and were not associated to any other problems. A decision was made to continue to follow.     5/19/2023: Without new symptoms.  Was seen at the gastroenterologist office and was placed on a trial testing, and on 2 drugs, semaglutide for the treatment of steatohepatitis.  Had lost approximately 12 pounds and since the commencement of the study and was experiencing some nausea as well as a reduction in his appetite.  He had been afebrile.  The exam disclosed no changes.  A dentulous, without any oral ulcers.  No palpable lymphadenopathy.  Abdomen protuberant.  Difficult to tell if the liver or spleen were enlarged as before.  No peripheral edema.  The white cell count had decreased to 1860/mm³, with neutrophils, as well lower than 1000/mm³ at 860/mm³.  The platelet count remained in the same range as before at 118,000/mm³.  A long discussion was had with him in regards to prevention of infections and neutropenic fever.  He was also asked to call immediately if he should have a fever.  Antinuclear antibodies and complement were requested and he was asked to return in 3 weeks.  Also requested  information on the clinical trial to see what other drug he could be receiving.     6/9/2023: Feeling well and without new symptoms.  Continues to participate in a clinical trial and had some modifications to his doses.  He continues to lose weight and has been consistently losing approximately 5 pounds per week.  No appetite.  Frequently nauseated but no diarrhea.  Afebrile.  No chest pains or cough.  On exam no changes.  The laboratory exams were reviewed.  The neutropenia had resolved.  RADHA and complement unremarkable.  Discussed with him.    9/29/2023: Not feeling well.  Nauseated.  Unable to eat.  Losing weight rapidly.  This started with an increase in the dose of the study medications that he has been receiving for the treatment of his steatohepatitis.  A few days ago he received some intravenous fluids and the dose of the medications was reduced.  However, in spite of that, he has persisted with the same symptoms.  He has been afebrile.  On exam he is well-hydrated.  Has lost a large amount of weight.  No jaundice.  The lungs are clear.  The heart is regular.  The abdomen is soft.  Liver and spleen are nonenlarged.  No edema.  The laboratory exams reported a white blood cell count of 2640/mm³.  He had minor absolute lymphocytopenia, however, his neutrophil count was well within the normal range.  Hemoglobin normal at 16.7 g/dL with normocytic red cells and platelets improved at 131,000/mm³.  A decision was made to continue to observe without intervention.  A discussion was had with him about his symptoms.  He had been tempted to discontinue the study medications.  Given that he has not been able to eat and that he is constantly vomiting it probably would be reasonable to stop.    2/5/2024: Much better.  Following the previous appointment, sometime in October 2023, he developed abdominal pain and sought attention from the hospital.  A diagnosis of appendicitis was made.  He was transferred to [facility] in  [facility] and underwent a laparoscopic appendectomy without complications.  However, following discharge, he could not walk because of muscle weakness.  He has been receiving physical therapy since then and is progressively stronger, now walking.  He is off of the study protocol.  He is able to eat.  He has maintained a stable weight.  He has been diagnosed with pathology of the gallbladder and is going to receive a cholecystectomy sometime in the future.  He has had no dyspnea or chest pains.  On exam he appears to be a chronically ill man.  He does not seem in distress.  He is not jaundiced.  He is a dentulous.  There is no palpable lymphadenopathy.  The lungs are clear.  The heart is regular.  The abdomen is rounded and protuberant but soft.  There is no edema.  Laboratory exams reported a white blood cell count of 2007/mm³ with eosinopenia, and eosinophil count of 0/mm³ and moderate neutropenia with an absolute neutrophil number of 1180/mm³.  The hemoglobin and platelet count are today unremarkable.  A decision was made to continue to observe.  Will measure again B12 and folic acid and will see with results in approximately 3 months.    5/6/2024: Much better.  Able to walk and exercising regularly.  Has regained some of the weight that he had lost and is progressively stronger.  He has had no nausea or vomiting.  He has had no chest pains or cough and denies abdominal pain.  He had edema of the lower extremities and was placed on spironolactone which he has been taking every day.  On exam he appears chronically ill.  No distress.  No jaundice.  The lungs are diminished bilaterally.  The heart is regular.  The abdomen is soft but protuberant and not tender.  There is no edema.  Laboratory exams reviewed.  The white cell count is up to 1930/mm³ with slight monocytosis and persistent neutropenia.  This is a new development and probably related to the use of spironolactone.  I have instructed him to  discontinue the diuretic.  I will have his blood count checked every week and will see him in approximately 6 weeks.  He is to call if any problems arise prior to the next visit.    6/19/2024: Feeling much better. Now able to walk longer and with better equilibrium. His appetite has improved, as well and he has gained some of the weight he had lost. He has been afebrile, though his temperature today was somewhat greater than 99. He has been without chest pain. No cough. No abdominal pain and no bleeding. He has persisted with peripheral edema but the use of diuretics and compression stockings have made a difference.     7/26/2024: Feels progressively better and is stronger. Eating well and no more nausea. No fevers. No chest pain or cough and no abdominal pain. On exam alert and appearing ill. No distress and no jaundice. No oral lesions and respirations not labored and the lungs are clear. The abdomen is protuberant but soft. There is edema. The laboratory exams and the report of pathology were reviewed and discussed with him.My impression, based on the results, that his pancytopenia is the result of his splenomegaly and liver dysfunction.     11/1/2024: Progressively stronger. Gaining weight and now above the weight he had before. No fevers . No nocturnal diaphoresis. Denies chest pain. Walking more. No abdominal pain or diarrhea. Persists with edema but less. Has been having nocturia. On exam alert and conversant oriented and in no distress. No jaundice. The lungs are clear and the heart is regular. Abdomen is soft and not tender. No edema. No skin rash. The laboratory exams were reviewed and discussed with him. To continue observation. He persists with cytopenias but not worse than before.     5/6/2025: Reports chronic fatigue. Has been trying to be active.  Reports that he has been gaining weight.  Denies fever, chills, diaphoresis.  Denies chest pain, dyspnea, abdominal pain. No bleeding concerns.  On exam,  alert and conversant.  Appears in good spirits.  No distress.  No jaundice nor pallor noted.  Lungs clear and heart regular.  Abdomen protuberant, but soft and nontender.  No edema noted. Reviewed laboratory exams and discussed with him. He persists with cytopenias, but no worse than prior. He continues to follow with gastroenterology, has an upcoming appointment. Reports recent abdominal US at [facility] was stable.      Past Medical History:   Diagnosis Date    B12 deficiency     Chronic fatigue     Dizziness     Elevated LFTs     Fatty liver     NON ALCOHOLIC    GERD (gastroesophageal reflux disease)     Gout     Headache     HSV infection     Hyperglycemia     Hyperlipidemia     Hypertension     Obesity     Obstructive sleep apnea     Onychomycosis     Pain in joint, multiple sites     Sebaceous cyst     Skin nodule     OF ARM, LEFT    Snoring     Vision changes      Past Surgical History:   Procedure Laterality Date    APPENDECTOMY         Current Outpatient Medications:     gabapentin (NEURONTIN) 400 MG capsule, Take 1 capsule by mouth 3 (Three) Times a Day., Disp: 90 capsule, Rfl: 5    Gas-X Ultra Strength 180 MG capsule, , Disp: , Rfl:     HYDROcodone-acetaminophen (NORCO) 5-325 MG per tablet, TAKE 1 TABLET BY MOUTH EVERY 6 HOURS AS NEEDED FOR SEVERE PAIN, Disp: 60 tablet, Rfl: 0    hydrOXYzine (ATARAX) 25 MG tablet, Take 1 tablet by mouth Every 8 (Eight) Hours As Needed for Itching., Disp: 270 tablet, Rfl: 1    omeprazole (priLOSEC) 40 MG capsule, Take 1 capsule by mouth Daily., Disp: 90 capsule, Rfl: 1    Semaglutide-Weight Management 0.5 MG/0.5ML solution auto-injector, Inject 0.5 mL under the skin into the appropriate area as directed 1 (One) Time Per Week. Compound with B12, Disp: 2 mL, Rfl: 0    tiZANidine (ZANAFLEX) 2 MG tablet, TAKE 1 TABLET BY MOUTH 2 TIMES A DAY AS NEEDED FOR MUSCLE SPASM, Disp: 40 tablet, Rfl: 0    spironolactone (ALDACTONE) 50 MG tablet, Take 1 tablet by mouth Daily.  swelling (Patient not taking: Reported on 5/6/2025), Disp: 90 tablet, Rfl: 1    Allergies   Allergen Reactions    Ozempic (0.25 Or 0.5 Mg-Dose) [Semaglutide(0.25 Or 0.5mg-Dos)] GI Intolerance    Sulfa Antibiotics Swelling and Rash     Rash, swelling, tingling, peeling.     Family History   Problem Relation Age of Onset    Sleep apnea Father     COPD Father     Other Brother         Desmoid tumor of the abdomen    Hypertension Other     Rheum arthritis Other      Cancer-related family history is not on file.    Social History     Tobacco Use    Smoking status: Never    Smokeless tobacco: Never   Vaping Use    Vaping status: Never Used   Substance Use Topics    Alcohol use: No    Drug use: No     Social History     Social History Narrative    Not on file      ROS:     Review of Systems   Constitutional:  Positive for fatigue. Negative for activity change, appetite change, chills, diaphoresis, fever and unexpected weight change.   HENT:  Negative for congestion, dental problem, drooling, ear discharge, ear pain, facial swelling, hearing loss, mouth sores, nosebleeds, postnasal drip, rhinorrhea, sinus pressure, sinus pain, sneezing, sore throat, tinnitus, trouble swallowing and voice change.    Eyes:  Negative for photophobia, pain, discharge, redness, itching and visual disturbance.   Respiratory:  Negative for apnea, cough, choking, chest tightness, shortness of breath, wheezing and stridor.    Cardiovascular:  Negative for chest pain, palpitations and leg swelling.   Gastrointestinal:  Negative for abdominal distention, abdominal pain, anal bleeding, blood in stool, constipation, diarrhea, nausea, rectal pain and vomiting.   Endocrine: Negative for cold intolerance, heat intolerance, polydipsia and polyuria.   Genitourinary:  Negative for decreased urine volume, difficulty urinating, dysuria, flank pain, frequency, genital sores, hematuria and urgency.   Musculoskeletal:  Negative for arthralgias, back pain, gait  "problem, joint swelling, myalgias, neck pain and neck stiffness.   Skin:  Negative for color change, pallor, rash and wound.   Neurological:  Negative for dizziness, tremors, seizures, syncope, facial asymmetry, speech difficulty, weakness, light-headedness, numbness and headaches.   Hematological:  Negative for adenopathy. Does not bruise/bleed easily.   Psychiatric/Behavioral:  Negative for agitation, behavioral problems, confusion, decreased concentration, hallucinations, self-injury, sleep disturbance and suicidal ideas. The patient is not nervous/anxious.      Objective:    Vitals:    05/06/25 1315   BP: 137/86   Pulse: 78   Temp: 98.4 °F (36.9 °C)   SpO2: 97%   Weight: (!) 138 kg (305 lb)   Height: 162.6 cm (64.02\")   PainSc: 0-No pain       Body mass index is 52.33 kg/m².  ECOG  (1) Restricted in physically strenuous activity, ambulatory and able to do work of light nature      Physical Exam:     Physical Exam  Vitals reviewed.   Constitutional:       General: He is not in acute distress.     Appearance: He is not toxic-appearing.      Comments: Body mass index is 52.33 kg/m².     HENT:      Head: Normocephalic and atraumatic.      Right Ear: External ear normal.      Left Ear: External ear normal.   Eyes:      General: No scleral icterus.     Conjunctiva/sclera: Conjunctivae normal.   Cardiovascular:      Rate and Rhythm: Normal rate and regular rhythm.      Pulses: Normal pulses.      Heart sounds: No murmur heard.  Pulmonary:      Effort: Pulmonary effort is normal.      Breath sounds: Normal breath sounds.   Abdominal:      General: There is no distension.      Palpations: Abdomen is soft. There is no mass.      Tenderness: There is no abdominal tenderness.      Comments: Protuberant, soft and nontender. Unable to palpate spleen due to body habitus.   Musculoskeletal:         General: Normal range of motion.      Cervical back: Normal range of motion and neck supple.   Skin:     General: Skin is warm.     "  Coloration: Skin is not jaundiced or pale.      Findings: No bruising, erythema, lesion or rash.   Neurological:      General: No focal deficit present.      Mental Status: He is alert and oriented to person, place, and time.      Motor: No weakness.   Psychiatric:         Mood and Affect: Mood normal.         Behavior: Behavior normal.         Thought Content: Thought content normal.         Judgment: Judgment normal.         Lab Results - Last 18 Months   Lab Units 05/06/25  1300 01/16/25  0929 11/01/24  1022   WBC 10*3/mm3 2.43* 2.57* 2.29*   HEMOGLOBIN g/dL 12.3* 12.7* 12.2*   HEMATOCRIT % 37.9 39.1 38.5   PLATELETS 10*3/mm3 123* 162 136*   MCV fL 79.1 78.8* 80.2     Lab Results - Last 18 Months   Lab Units 01/16/25  0929 11/01/24  1022 07/26/24  1343   SODIUM mmol/L 136 137 137   POTASSIUM mmol/L 4.6 4.0 4.0   CHLORIDE mmol/L 99 102 103   CO2 mmol/L 26.0 26.2 23.4   BUN mg/dL 8 5* 6   CREATININE mg/dL 0.78 0.82 0.68*   CALCIUM mg/dL 9.1 9.1 8.9   BILIRUBIN mg/dL 0.7 0.6 0.4   ALK PHOS U/L 118* 124* 109   ALT (SGPT) U/L 18 11 14   AST (SGOT) U/L 29 29 26   GLUCOSE mg/dL 103* 67 145*     Lab Results   Component Value Date    GLUCOSE 103 (H) 01/16/2025    BUN 8 01/16/2025    CREATININE 0.78 01/16/2025    EGFRIFNONA 107 09/16/2021    BCR 10.3 01/16/2025    K 4.6 01/16/2025    CO2 26.0 01/16/2025    CALCIUM 9.1 01/16/2025    ALBUMIN 3.7 01/16/2025    AST 29 01/16/2025    ALT 18 01/16/2025     Uric Acid   Date Value Ref Range Status   08/04/2022 4.1 3.4 - 7.0 mg/dL Final     Assessment & Plan     Assessment and Plan:  Leukopenia and neutropenia: Persists without much change. The splenomegaly is probably the explanation.   Thrombocytopenia: Stable. As above.  For now to continue to follow.   Steatohepatitis and cirrhosis: Continues to follow with gastroenterology. Had recent abdominal US and upcoming appointment. Will obtain records  Follow with Dr. Greenwood in 6 months, sooner if condition indicates    Electronically  signed by Gerda Almazan PA-C

## 2025-05-06 ENCOUNTER — OFFICE VISIT (OUTPATIENT)
Dept: ONCOLOGY | Facility: CLINIC | Age: 45
End: 2025-05-06
Payer: COMMERCIAL

## 2025-05-06 ENCOUNTER — LAB (OUTPATIENT)
Dept: LAB | Facility: HOSPITAL | Age: 45
End: 2025-05-06
Payer: COMMERCIAL

## 2025-05-06 VITALS
WEIGHT: 305 LBS | TEMPERATURE: 98.4 F | OXYGEN SATURATION: 97 % | BODY MASS INDEX: 52.07 KG/M2 | HEART RATE: 78 BPM | DIASTOLIC BLOOD PRESSURE: 86 MMHG | SYSTOLIC BLOOD PRESSURE: 137 MMHG | HEIGHT: 64 IN

## 2025-05-06 DIAGNOSIS — D69.6 THROMBOCYTOPENIA: Primary | ICD-10-CM

## 2025-05-06 DIAGNOSIS — R16.1 SPLENOMEGALY: ICD-10-CM

## 2025-05-06 DIAGNOSIS — D70.8 OTHER NEUTROPENIA: ICD-10-CM

## 2025-05-06 DIAGNOSIS — E53.8 B12 DEFICIENCY: Primary | ICD-10-CM

## 2025-05-06 DIAGNOSIS — E66.01 CLASS 3 SEVERE OBESITY WITHOUT SERIOUS COMORBIDITY WITH BODY MASS INDEX (BMI) OF 50.0 TO 59.9 IN ADULT, UNSPECIFIED OBESITY TYPE: ICD-10-CM

## 2025-05-06 DIAGNOSIS — E66.813 CLASS 3 SEVERE OBESITY WITHOUT SERIOUS COMORBIDITY WITH BODY MASS INDEX (BMI) OF 50.0 TO 59.9 IN ADULT, UNSPECIFIED OBESITY TYPE: ICD-10-CM

## 2025-05-06 LAB
BASOPHILS # BLD AUTO: 0 10*3/MM3 (ref 0–0.2)
BASOPHILS NFR BLD AUTO: 0 % (ref 0–1.5)
DEPRECATED RDW RBC AUTO: 47.2 FL (ref 37–54)
EOSINOPHIL # BLD AUTO: 0 10*3/MM3 (ref 0–0.4)
EOSINOPHIL NFR BLD AUTO: 0 % (ref 0.3–6.2)
ERYTHROCYTE [DISTWIDTH] IN BLOOD BY AUTOMATED COUNT: 16.7 % (ref 12.3–15.4)
HCT VFR BLD AUTO: 37.9 % (ref 37.5–51)
HGB BLD-MCNC: 12.3 G/DL (ref 13–17.7)
HOLD SPECIMEN: NORMAL
HOLD SPECIMEN: NORMAL
LYMPHOCYTES # BLD AUTO: 0.79 10*3/MM3 (ref 0.7–3.1)
LYMPHOCYTES NFR BLD AUTO: 32.5 % (ref 19.6–45.3)
MCH RBC QN AUTO: 25.7 PG (ref 26.6–33)
MCHC RBC AUTO-ENTMCNC: 32.5 G/DL (ref 31.5–35.7)
MCV RBC AUTO: 79.1 FL (ref 79–97)
MONOCYTES # BLD AUTO: 0.24 10*3/MM3 (ref 0.1–0.9)
MONOCYTES NFR BLD AUTO: 9.9 % (ref 5–12)
NEUTROPHILS NFR BLD AUTO: 1.4 10*3/MM3 (ref 1.7–7)
NEUTROPHILS NFR BLD AUTO: 57.6 % (ref 42.7–76)
PLATELET # BLD AUTO: 123 10*3/MM3 (ref 140–450)
PMV BLD AUTO: 9.1 FL (ref 6–12)
RBC # BLD AUTO: 4.79 10*6/MM3 (ref 4.14–5.8)
WBC NRBC COR # BLD AUTO: 2.43 10*3/MM3 (ref 3.4–10.8)

## 2025-05-06 PROCEDURE — 36415 COLL VENOUS BLD VENIPUNCTURE: CPT

## 2025-05-06 PROCEDURE — 85025 COMPLETE CBC W/AUTO DIFF WBC: CPT

## 2025-05-06 PROCEDURE — 99214 OFFICE O/P EST MOD 30 MIN: CPT | Performed by: PHYSICIAN ASSISTANT

## 2025-05-08 ENCOUNTER — APPOINTMENT (OUTPATIENT)
Dept: LAB | Facility: HOSPITAL | Age: 45
End: 2025-05-08
Payer: COMMERCIAL

## 2025-05-19 DIAGNOSIS — Z90.49 S/P APPENDECTOMY: ICD-10-CM

## 2025-05-19 DIAGNOSIS — M62.838 MUSCLE SPASM: ICD-10-CM

## 2025-05-19 DIAGNOSIS — R20.0 NUMBNESS AND TINGLING IN BOTH HANDS: ICD-10-CM

## 2025-05-19 DIAGNOSIS — R20.2 NUMBNESS AND TINGLING IN BOTH HANDS: ICD-10-CM

## 2025-05-20 RX ORDER — TIZANIDINE 2 MG/1
TABLET ORAL
Qty: 40 TABLET | Refills: 0 | Status: SHIPPED | OUTPATIENT
Start: 2025-05-20

## 2025-05-20 RX ORDER — HYDROCODONE BITARTRATE AND ACETAMINOPHEN 5; 325 MG/1; MG/1
1 TABLET ORAL
Qty: 60 TABLET | Refills: 0 | Status: SHIPPED | OUTPATIENT
Start: 2025-05-20

## 2025-05-23 RX ORDER — CEPHALEXIN 500 MG/1
500 CAPSULE ORAL 3 TIMES DAILY
Qty: 21 CAPSULE | Refills: 0 | Status: SHIPPED | OUTPATIENT
Start: 2025-05-23 | End: 2025-05-30

## 2025-05-23 RX ORDER — MUPIROCIN 20 MG/G
1 OINTMENT TOPICAL 3 TIMES DAILY
Qty: 30 G | Refills: 0 | Status: SHIPPED | OUTPATIENT
Start: 2025-05-23

## 2025-06-24 DIAGNOSIS — Z90.49 S/P APPENDECTOMY: ICD-10-CM

## 2025-06-24 DIAGNOSIS — R20.2 NUMBNESS AND TINGLING IN BOTH HANDS: ICD-10-CM

## 2025-06-24 DIAGNOSIS — R20.0 NUMBNESS AND TINGLING IN BOTH HANDS: ICD-10-CM

## 2025-06-25 RX ORDER — HYDROCODONE BITARTRATE AND ACETAMINOPHEN 5; 325 MG/1; MG/1
1 TABLET ORAL
Qty: 60 TABLET | Refills: 0 | Status: SHIPPED | OUTPATIENT
Start: 2025-06-25

## 2025-07-03 ENCOUNTER — OFFICE VISIT (OUTPATIENT)
Dept: FAMILY MEDICINE CLINIC | Facility: CLINIC | Age: 45
End: 2025-07-03
Payer: COMMERCIAL

## 2025-07-03 VITALS
SYSTOLIC BLOOD PRESSURE: 124 MMHG | HEIGHT: 64 IN | DIASTOLIC BLOOD PRESSURE: 76 MMHG | HEART RATE: 71 BPM | WEIGHT: 300.6 LBS | BODY MASS INDEX: 51.32 KG/M2 | OXYGEN SATURATION: 96 % | TEMPERATURE: 98.3 F

## 2025-07-03 DIAGNOSIS — L08.9 LOCAL SKIN INFECTION: Primary | ICD-10-CM

## 2025-07-03 PROCEDURE — 99213 OFFICE O/P EST LOW 20 MIN: CPT | Performed by: NURSE PRACTITIONER

## 2025-07-03 RX ORDER — SPIRONOLACTONE 50 MG/1
50 TABLET, FILM COATED ORAL DAILY
COMMUNITY

## 2025-07-03 RX ORDER — DOXYCYCLINE HYCLATE 100 MG
100 TABLET ORAL 2 TIMES DAILY
Qty: 20 TABLET | Refills: 0 | Status: SHIPPED | OUTPATIENT
Start: 2025-07-03 | End: 2025-07-13

## 2025-07-03 RX ORDER — CHLORHEXIDINE GLUCONATE 40 MG/ML
SOLUTION TOPICAL
Qty: 236 ML | Refills: 0 | Status: SHIPPED | OUTPATIENT
Start: 2025-07-03

## 2025-07-03 NOTE — PROGRESS NOTES
Chief Complaint  Chief Complaint   Patient presents with    Recurrent Skin Infections     BLE multiple sites, abdomen. Once one heals, another pops up. X 1.5 months   Pt states that they make him not feel well and he will sleep a lot, occasional fever/chills.           Subjective          Farhad Khan presents to [facility] for   History of Present Illness    Patient with hx of splenomegaly, neutropenia presents for recurrent skin infections of the lower extremities and abdomen as mentioned in chief complaint.  Provides pictures of multiple pustules scattered on the legs that bust and drain a purulent fluid, he has several currently on the legs that are healing and currently not draining. He has been treating each site with mupirocin.  He was treated with keflex on 5/23/25.       The following portions of the patient's history were reviewed and updated as appropriate: allergies, current medications, past family history, past medical history, past social history, past surgical history and problem list.    Past Medical History:   Diagnosis Date    B12 deficiency     Chronic fatigue     Dizziness     Elevated LFTs     Fatty liver     GERD (gastroesophageal reflux disease)     Gout     Headache     HSV infection     Hyperglycemia     Hyperlipidemia     Hypertension     Obesity     Obstructive sleep apnea     Onychomycosis     Pain in joint, multiple sites     Sebaceous cyst     Skin nodule     Snoring     Vision changes      Past Surgical History:   Procedure Laterality Date    APPENDECTOMY       Family History   Problem Relation Age of Onset    Sleep apnea Father     COPD Father     Other Brother         Desmoid tumor of the abdomen    Hypertension Other     Rheum arthritis Other      Social History     Tobacco Use    Smoking status: Never    Smokeless tobacco: Never   Substance Use Topics    Alcohol use: No       Current Outpatient Medications:     gabapentin (NEURONTIN) 400 MG  "capsule, Take 1 capsule by mouth 3 (Three) Times a Day., Disp: 90 capsule, Rfl: 5    Gas-X Ultra Strength 180 MG capsule, , Disp: , Rfl:     HYDROcodone-acetaminophen (NORCO) 5-325 MG per tablet, Take 1 tablet by mouth every 6 (six) to 8 (eight) hours as needed for Severe Pain., Disp: 60 tablet, Rfl: 0    hydrOXYzine (ATARAX) 25 MG tablet, Take 1 tablet by mouth Every 8 (Eight) Hours As Needed for Itching., Disp: 270 tablet, Rfl: 1    mupirocin (BACTROBAN) 2 % ointment, Apply 1 Application topically to the appropriate area as directed 3 (Three) Times a Day., Disp: 30 g, Rfl: 0    omeprazole (priLOSEC) 40 MG capsule, Take 1 capsule by mouth Daily., Disp: 90 capsule, Rfl: 1    spironolactone (ALDACTONE) 50 MG tablet, Take 1 tablet by mouth Daily., Disp: , Rfl:     tiZANidine (ZANAFLEX) 2 MG tablet, TAKE 1 TABLET BY MOUTH 2 TIMES A DAY AS NEEDED FOR MUSCLE SPASM, Disp: 40 tablet, Rfl: 0    Chlorhexidine Gluconate 4 % solution, Apply topically to skin and wash daily, Disp: 236 mL, Rfl: 0    doxycycline (VIBRAMYICN) 100 MG tablet, Take 1 tablet by mouth 2 (Two) Times a Day for 10 days., Disp: 20 tablet, Rfl: 0    Objective   Vital Signs:   Vitals:    07/03/25 0811   BP: 124/76   BP Location: Left arm   Patient Position: Sitting   Cuff Size: Large Adult   Pulse: 71   Temp: 98.3 °F (36.8 °C)   TempSrc: Oral   SpO2: 96%   Weight: (!) 136 kg (300 lb 9.6 oz)   Height: 162.6 cm (64\")   PainSc: 0-No pain       Body mass index is 51.6 kg/m².    Physical Exam  Constitutional:       General: He is not in acute distress.     Appearance: Normal appearance. He is obese. He is not ill-appearing.   Pulmonary:      Effort: Pulmonary effort is normal.   Musculoskeletal:         General: Swelling (trace pitting BLE) present. Normal range of motion.      Cervical back: Normal range of motion.   Skin:     General: Skin is warm and dry.      Findings: Lesion (scattered healing/scabbed pustules of BLE with erythema) present.   Neurological: "      General: No focal deficit present.      Mental Status: He is alert. Mental status is at baseline.      Motor: No weakness.      Gait: Gait normal.   Psychiatric:         Mood and Affect: Mood normal.         Behavior: Behavior normal.         Thought Content: Thought content normal.         Judgment: Judgment normal.          Result Review :     No visits with results within 7 Day(s) from this visit.   Latest known visit with results is:   Lab on 05/06/2025   Component Date Value Ref Range Status    WBC 05/06/2025 2.43 (L)  3.40 - 10.80 10*3/mm3 Final    RBC 05/06/2025 4.79  4.14 - 5.80 10*6/mm3 Final    Hemoglobin 05/06/2025 12.3 (L)  13.0 - 17.7 g/dL Final    Hematocrit 05/06/2025 37.9  37.5 - 51.0 % Final    MCV 05/06/2025 79.1  79.0 - 97.0 fL Final    MCH 05/06/2025 25.7 (L)  26.6 - 33.0 pg Final    MCHC 05/06/2025 32.5  31.5 - 35.7 g/dL Final    RDW 05/06/2025 16.7 (H)  12.3 - 15.4 % Final    RDW-SD 05/06/2025 47.2  37.0 - 54.0 fl Final    MPV 05/06/2025 9.1  6.0 - 12.0 fL Final    Platelets 05/06/2025 123 (L)  140 - 450 10*3/mm3 Final    Neutrophil % 05/06/2025 57.6  42.7 - 76.0 % Final    Lymphocyte % 05/06/2025 32.5  19.6 - 45.3 % Final    Monocyte % 05/06/2025 9.9  5.0 - 12.0 % Final    Eosinophil % 05/06/2025 0.0 (L)  0.3 - 6.2 % Final    Basophil % 05/06/2025 0.0  0.0 - 1.5 % Final    Neutrophils, Absolute 05/06/2025 1.40 (L)  1.70 - 7.00 10*3/mm3 Final    Lymphocytes, Absolute 05/06/2025 0.79  0.70 - 3.10 10*3/mm3 Final    Monocytes, Absolute 05/06/2025 0.24  0.10 - 0.90 10*3/mm3 Final    Eosinophils, Absolute 05/06/2025 0.00  0.00 - 0.40 10*3/mm3 Final    Basophils, Absolute 05/06/2025 0.00  0.00 - 0.20 10*3/mm3 Final    Extra Tube 05/06/2025 Hold for add-ons.   Final    Auto resulted.    Extra Tube 05/06/2025 Hold for add-ons.   Final    Auto resulted.                              Assessment and Plan    Diagnoses and all orders for this visit:    1. Local skin infection  (Primary)  Comments:  consider cx with next outbreak, ?MRSA  start hibiclens body wash daily for 1 wk  mupirocin to both nares and lesions BID x5 days  start doxy and apply mupirocin    Other orders  -     Chlorhexidine Gluconate 4 % solution; Apply topically to skin and wash daily  Dispense: 236 mL; Refill: 0  -     doxycycline (VIBRAMYICN) 100 MG tablet; Take 1 tablet by mouth 2 (Two) Times a Day for 10 days.  Dispense: 20 tablet; Refill: 0            I spent 25 minutes caring for Farhad Khan on this date of service. This time includes time spent by me in the following activities: preparing for the visit, reviewing tests, performing a medically appropriate examination and/or evaluation , counseling and educating the patient/family/caregiver, ordering medications, tests, or procedures and documenting information in the medical record        Follow Up     Return for Next scheduled follow up 7/16/25 for chronic conditions and re-eval.  Patient was given instructions and counseling regarding his condition or for health maintenance advice. Please see specific information pulled into the AVS if appropriate.        Part of this note may be an electronic transcription/translation of spoken language to printed text using the Dragon Dictation System

## 2025-07-15 ENCOUNTER — TELEPHONE (OUTPATIENT)
Dept: FAMILY MEDICINE CLINIC | Facility: CLINIC | Age: 45
End: 2025-07-15
Payer: COMMERCIAL

## 2025-07-15 NOTE — TELEPHONE ENCOUNTER
Attempted to call pt to confirm 7/16 appt, no answer: per verbal left msg for pt to call or go to [facility] to confirm or if needing to reschedule

## 2025-07-16 ENCOUNTER — OFFICE VISIT (OUTPATIENT)
Dept: FAMILY MEDICINE CLINIC | Facility: CLINIC | Age: 45
End: 2025-07-16
Payer: COMMERCIAL

## 2025-07-16 ENCOUNTER — LAB (OUTPATIENT)
Dept: FAMILY MEDICINE CLINIC | Facility: CLINIC | Age: 45
End: 2025-07-16
Payer: COMMERCIAL

## 2025-07-16 VITALS
SYSTOLIC BLOOD PRESSURE: 120 MMHG | DIASTOLIC BLOOD PRESSURE: 77 MMHG | HEIGHT: 64 IN | OXYGEN SATURATION: 97 % | BODY MASS INDEX: 50.84 KG/M2 | WEIGHT: 297.8 LBS | HEART RATE: 67 BPM

## 2025-07-16 DIAGNOSIS — R60.0 BILATERAL LOWER EXTREMITY EDEMA: ICD-10-CM

## 2025-07-16 DIAGNOSIS — L08.9 LOCAL SKIN INFECTION: Primary | ICD-10-CM

## 2025-07-16 DIAGNOSIS — I10 HYPERTENSION, UNSPECIFIED TYPE: ICD-10-CM

## 2025-07-16 DIAGNOSIS — D69.6 THROMBOCYTOPENIA: ICD-10-CM

## 2025-07-16 DIAGNOSIS — M62.838 MUSCLE SPASM: ICD-10-CM

## 2025-07-16 DIAGNOSIS — K21.9 GASTROESOPHAGEAL REFLUX DISEASE WITHOUT ESOPHAGITIS: ICD-10-CM

## 2025-07-16 DIAGNOSIS — R14.0 ABDOMINAL BLOATING: ICD-10-CM

## 2025-07-16 DIAGNOSIS — R73.09 ABNORMAL GLUCOSE: ICD-10-CM

## 2025-07-16 DIAGNOSIS — L29.9 PRURITUS: ICD-10-CM

## 2025-07-16 DIAGNOSIS — G47.33 OBSTRUCTIVE SLEEP APNEA: ICD-10-CM

## 2025-07-16 DIAGNOSIS — E66.813 CLASS 3 SEVERE OBESITY WITHOUT SERIOUS COMORBIDITY WITH BODY MASS INDEX (BMI) OF 50.0 TO 59.9 IN ADULT, UNSPECIFIED OBESITY TYPE: ICD-10-CM

## 2025-07-16 DIAGNOSIS — Z12.5 PROSTATE CANCER SCREENING: ICD-10-CM

## 2025-07-16 DIAGNOSIS — E78.2 MIXED HYPERLIPIDEMIA: ICD-10-CM

## 2025-07-16 DIAGNOSIS — K75.81 NASH (NONALCOHOLIC STEATOHEPATITIS): ICD-10-CM

## 2025-07-16 DIAGNOSIS — G25.81 RESTLESS LEG: ICD-10-CM

## 2025-07-16 LAB — HBA1C MFR BLD: 5.6 % (ref 4.8–5.6)

## 2025-07-16 PROCEDURE — 80050 GENERAL HEALTH PANEL: CPT | Performed by: NURSE PRACTITIONER

## 2025-07-16 PROCEDURE — 36415 COLL VENOUS BLD VENIPUNCTURE: CPT | Performed by: NURSE PRACTITIONER

## 2025-07-16 PROCEDURE — G0103 PSA SCREENING: HCPCS | Performed by: NURSE PRACTITIONER

## 2025-07-16 PROCEDURE — 80061 LIPID PANEL: CPT | Performed by: NURSE PRACTITIONER

## 2025-07-16 PROCEDURE — 82607 VITAMIN B-12: CPT | Performed by: NURSE PRACTITIONER

## 2025-07-16 PROCEDURE — 83036 HEMOGLOBIN GLYCOSYLATED A1C: CPT | Performed by: NURSE PRACTITIONER

## 2025-07-16 PROCEDURE — 82306 VITAMIN D 25 HYDROXY: CPT | Performed by: NURSE PRACTITIONER

## 2025-07-16 RX ORDER — TIZANIDINE 2 MG/1
2 TABLET ORAL 2 TIMES DAILY PRN
Qty: 40 TABLET | Refills: 2 | Status: SHIPPED | OUTPATIENT
Start: 2025-07-16

## 2025-07-16 RX ORDER — SPIRONOLACTONE 50 MG/1
50 TABLET, FILM COATED ORAL DAILY
Qty: 90 TABLET | Refills: 1 | Status: SHIPPED | OUTPATIENT
Start: 2025-07-16

## 2025-07-16 RX ORDER — OMEPRAZOLE 40 MG/1
40 CAPSULE, DELAYED RELEASE ORAL DAILY
Qty: 90 CAPSULE | Refills: 1 | Status: SHIPPED | OUTPATIENT
Start: 2025-07-16

## 2025-07-16 RX ORDER — PHENTERMINE HYDROCHLORIDE 37.5 MG/1
37.5 CAPSULE ORAL EVERY MORNING
Qty: 30 CAPSULE | Refills: 2 | Status: SHIPPED | OUTPATIENT
Start: 2025-07-16

## 2025-07-16 RX ORDER — HYDROXYZINE HYDROCHLORIDE 25 MG/1
25 TABLET, FILM COATED ORAL EVERY 8 HOURS PRN
Qty: 270 TABLET | Refills: 1 | Status: SHIPPED | OUTPATIENT
Start: 2025-07-16

## 2025-07-16 NOTE — PROGRESS NOTES
Chief Complaint  Chief Complaint   Patient presents with    Follow-up     6 month    Leg Swelling     Pt notes he has not noted increased urination with water pill till he lays down at night, takes in the morning. Increased leg swelling karen in evenings after work. Does not do elevation of affected area.            Subjective          Farhad Khan presents to [facility] for   History of Present Illness    Recurrent skin infections primarily in the legs and groin, patient was seen on 7/3/2025, provided rx for Hibiclens, mupirocin and doxycycline, reports skin is itching more now after using hibiclens    Patient following with oncology/hematology for thrombocytopenia, neutropenia and leukocytosis. 7/2/24 underwent bone marrow biopsy.      DEGROOT, with splenomegaly and leukopenia, he is following with hematology and gastro. He underwent appendectomy on 10/10/2023, course was complicated. He still complains of itching, worse at night, takes hydroxyzine 3 times daily. Recommended by Dr. Pizano if he continues to have gas and bloating to consider cholecystectomy.    -On 1/2/2024 he underwent ERCP, esophageal dilation and biliary sphincterotomy of the common bile duct per Dr. Pizano.    -He was on ozempic through a research study with gastro for DEGROOT, low dose did well with weight loss but higher dose could not tolerate d/t nausea. Tried compounded semaglutide and did not tolerate. He is trying to lose weight now w/o meds.       He c/o pain/stiffness/aching in the hands, the pins needle sensation of BLE has improved. He is not currently going to the gym, but still trying to get 6k-10k steps in daily and doing stairs.  Gait and weakness have improved, feels more steady on his feet. He is trying to wean gabapentin and norco-down to 1/2 to 1 norco at night. Reports having a tremor at the end of the day in BUE.      HTN, off lisinopril now, taking only spironolactone. Denies chest pain,  headache, shortness of air, palpitations and does c/o swelling of lower extremities L>R, he works on a computer uses stationary bike under desk and is increasing activity level.     Reports restless legs at night, has to move through the night which causes him to not sleep well. He tried his wifes zanaflex and helped.     DALE, does not use cpap      The following portions of the patient's history were reviewed and updated as appropriate: allergies, current medications, past family history, past medical history, past social history, past surgical history and problem list.    Past Medical History:   Diagnosis Date    B12 deficiency     Chronic fatigue     Dizziness     Elevated LFTs     Fatty liver     GERD (gastroesophageal reflux disease)     Gout     Headache     HSV infection     Hyperglycemia     Hyperlipidemia     Hypertension     Obesity     Obstructive sleep apnea     Onychomycosis     Pain in joint, multiple sites     Sebaceous cyst     Skin nodule     Snoring     Vision changes      Past Surgical History:   Procedure Laterality Date    APPENDECTOMY       Family History   Problem Relation Age of Onset    Sleep apnea Father     COPD Father     Other Brother         Desmoid tumor of the abdomen    Hypertension Other     Rheum arthritis Other      Social History     Tobacco Use    Smoking status: Never    Smokeless tobacco: Never   Substance Use Topics    Alcohol use: No       Current Outpatient Medications:     Chlorhexidine Gluconate 4 % solution, Apply topically to skin and wash daily, Disp: 236 mL, Rfl: 0    gabapentin (NEURONTIN) 400 MG capsule, Take 1 capsule by mouth 3 (Three) Times a Day., Disp: 90 capsule, Rfl: 5    Gas-X Ultra Strength 180 MG capsule, , Disp: , Rfl:     HYDROcodone-acetaminophen (NORCO) 5-325 MG per tablet, Take 1 tablet by mouth every 6 (six) to 8 (eight) hours as needed for Severe Pain., Disp: 60 tablet, Rfl: 0    hydrOXYzine (ATARAX) 25 MG tablet, Take 1 tablet by mouth Every 8  "(Eight) Hours As Needed for Itching., Disp: 270 tablet, Rfl: 1    mupirocin (BACTROBAN) 2 % ointment, Apply 1 Application topically to the appropriate area as directed 3 (Three) Times a Day., Disp: 30 g, Rfl: 0    omeprazole (priLOSEC) 40 MG capsule, Take 1 capsule by mouth Daily., Disp: 90 capsule, Rfl: 1    spironolactone (ALDACTONE) 50 MG tablet, Take 1 tablet by mouth Daily., Disp: 90 tablet, Rfl: 1    tiZANidine (ZANAFLEX) 2 MG tablet, Take 1 tablet by mouth 2 (Two) Times a Day As Needed for Muscle Spasms., Disp: 40 tablet, Rfl: 2    phentermine 37.5 MG capsule, Take 1 capsule by mouth Every Morning., Disp: 30 capsule, Rfl: 2    vitamin D (ERGOCALCIFEROL) 1.25 MG (39509 UT) capsule capsule, Take 1 po daily for 3 days then once weekly on mondays, Disp: 15 capsule, Rfl: 1    Objective   Vital Signs:   Vitals:    07/16/25 0828   BP: 120/77   BP Location: Left arm   Patient Position: Sitting   Cuff Size: Large Adult   Pulse: 67   SpO2: 97%   Weight: 135 kg (297 lb 12.8 oz)   Height: 162.6 cm (64\")   PainSc: 2    PainLoc: Hand       Body mass index is 51.12 kg/m².    Physical Exam  Constitutional:       General: He is not in acute distress.     Appearance: Normal appearance. He is well-developed. He is obese. He is not ill-appearing or diaphoretic.   HENT:      Head: Normocephalic.   Eyes:      Conjunctiva/sclera: Conjunctivae normal.      Pupils: Pupils are equal, round, and reactive to light.   Neck:      Thyroid: No thyromegaly.      Vascular: No JVD.   Cardiovascular:      Rate and Rhythm: Normal rate and regular rhythm.      Heart sounds: Normal heart sounds. No murmur heard.  Pulmonary:      Effort: Pulmonary effort is normal. No respiratory distress.      Breath sounds: Normal breath sounds. No wheezing or rhonchi.   Abdominal:      General: Bowel sounds are normal. There is no distension.      Palpations: Abdomen is soft.      Tenderness: There is no abdominal tenderness.   Musculoskeletal:         General: " No swelling or tenderness. Normal range of motion.      Cervical back: Normal range of motion and neck supple. No tenderness.   Lymphadenopathy:      Cervical: No cervical adenopathy.   Skin:     General: Skin is warm and dry.      Coloration: Skin is not jaundiced.      Findings: Lesion (local skin infection of BLE scabbed and nearly resolved) present. No erythema or rash.   Neurological:      General: No focal deficit present.      Mental Status: He is alert and oriented to person, place, and time. Mental status is at baseline.      Sensory: Sensory deficit (BUE/BLE) present.      Motor: No weakness.      Gait: Gait abnormal (improving).   Psychiatric:         Mood and Affect: Mood normal.         Behavior: Behavior normal.         Thought Content: Thought content normal.         Judgment: Judgment normal.          Result Review :     Office Visit on 07/16/2025   Component Date Value Ref Range Status    Total Cholesterol 07/16/2025 170  0 - 200 mg/dL Final    Triglycerides 07/16/2025 102  0 - 150 mg/dL Final    HDL Cholesterol 07/16/2025 32 (L)  40 - 60 mg/dL Final    LDL Cholesterol  07/16/2025 119 (H)  0 - 100 mg/dL Final    VLDL Cholesterol 07/16/2025 19  5 - 40 mg/dL Final    LDL/HDL Ratio 07/16/2025 3.68   Final    Glucose 07/16/2025 106 (H)  65 - 99 mg/dL Final    BUN 07/16/2025 3.0 (L)  6.0 - 20.0 mg/dL Final    Creatinine 07/16/2025 0.79  0.76 - 1.27 mg/dL Final    Sodium 07/16/2025 135 (L)  136 - 145 mmol/L Final    Potassium 07/16/2025 4.5  3.5 - 5.2 mmol/L Final    Chloride 07/16/2025 102  98 - 107 mmol/L Final    CO2 07/16/2025 25.2  22.0 - 29.0 mmol/L Final    Calcium 07/16/2025 8.8  8.6 - 10.5 mg/dL Final    Total Protein 07/16/2025 7.2  6.0 - 8.5 g/dL Final    Albumin 07/16/2025 3.4 (L)  3.5 - 5.2 g/dL Final    ALT (SGPT) 07/16/2025 11  1 - 41 U/L Final    AST (SGOT) 07/16/2025 25  1 - 40 U/L Final    Alkaline Phosphatase 07/16/2025 98  39 - 117 U/L Final    Total Bilirubin 07/16/2025 1.0  0.0 - 1.2  mg/dL Final    Globulin 07/16/2025 3.8  gm/dL Final    A/G Ratio 07/16/2025 0.9  g/dL Final    BUN/Creatinine Ratio 07/16/2025 3.8 (L)  7.0 - 25.0 Final    Anion Gap 07/16/2025 7.8  5.0 - 15.0 mmol/L Final    eGFR 07/16/2025 111.0  >60.0 mL/min/1.73 Final    TSH 07/16/2025 1.310  0.270 - 4.200 uIU/mL Final    Hemoglobin A1C 07/16/2025 5.60  4.80 - 5.60 % Final    25 Hydroxy, Vitamin D 07/16/2025 14.0 (L)  30.0 - 100.0 ng/ml Final    Vitamin B-12 07/16/2025 738  211 - 946 pg/mL Final    PSA 07/16/2025 0.316  0.000 - 4.000 ng/mL Final    WBC 07/16/2025 1.90 (C)  3.40 - 10.80 10*3/mm3 Final    RBC 07/16/2025 4.74  4.14 - 5.80 10*6/mm3 Final    Hemoglobin 07/16/2025 12.6 (L)  13.0 - 17.7 g/dL Final    Hematocrit 07/16/2025 40.3  37.5 - 51.0 % Final    MCV 07/16/2025 85.0  79.0 - 97.0 fL Final    MCH 07/16/2025 26.6  26.6 - 33.0 pg Final    MCHC 07/16/2025 31.3 (L)  31.5 - 35.7 g/dL Final    RDW 07/16/2025 15.8 (H)  12.3 - 15.4 % Final    RDW-SD 07/16/2025 49.2  37.0 - 54.0 fl Final    MPV 07/16/2025 10.3  6.0 - 12.0 fL Final    Platelets 07/16/2025 125 (L)  140 - 450 10*3/mm3 Final    Neutrophil % 07/16/2025 47.9  42.7 - 76.0 % Final    Lymphocyte % 07/16/2025 41.6  19.6 - 45.3 % Final    Monocyte % 07/16/2025 10.5  5.0 - 12.0 % Final    Eosinophil % 07/16/2025 0.0 (L)  0.3 - 6.2 % Final    Basophil % 07/16/2025 0.0  0.0 - 1.5 % Final    Immature Grans % 07/16/2025 0.0  0.0 - 0.5 % Final    Neutrophils, Absolute 07/16/2025 0.91 (L)  1.70 - 7.00 10*3/mm3 Final    Lymphocytes, Absolute 07/16/2025 0.79  0.70 - 3.10 10*3/mm3 Final    Monocytes, Absolute 07/16/2025 0.20  0.10 - 0.90 10*3/mm3 Final    Eosinophils, Absolute 07/16/2025 0.00  0.00 - 0.40 10*3/mm3 Final    Basophils, Absolute 07/16/2025 0.00  0.00 - 0.20 10*3/mm3 Final    Immature Grans, Absolute 07/16/2025 0.00  0.00 - 0.05 10*3/mm3 Final    nRBC 07/16/2025 0.0  0.0 - 0.2 /100 WBC Final                  Class 3 Severe Obesity (BMI >=40). Obesity-related health  conditions include the following: obstructive sleep apnea, hypertension, impaired fasting glucose, and dyslipidemias. Obesity is unchanged. BMI is is above average; BMI management plan is completed. We discussed low calorie, low carb based diet program, portion control, increasing exercise, and joining a fitness center or start home based exercise program.           Assessment and Plan    Diagnoses and all orders for this visit:    1. Local skin infection (Primary)    2. DEGROOT (nonalcoholic steatohepatitis)  -     Comprehensive Metabolic Panel  -     CBC & Differential  -     Vitamin D,25-Hydroxy  -     Vitamin B12    3. Bilateral lower extremity edema  -     spironolactone (ALDACTONE) 50 MG tablet; Take 1 tablet by mouth Daily.  Dispense: 90 tablet; Refill: 1    4. Class 3 severe obesity without serious comorbidity with body mass index (BMI) of 50.0 to 59.9 in adult, unspecified obesity type  -     phentermine 37.5 MG capsule; Take 1 capsule by mouth Every Morning.  Dispense: 30 capsule; Refill: 2    5. Mixed hyperlipidemia  -     Lipid Panel  -     Comprehensive Metabolic Panel    6. Obstructive sleep apnea    7. Gastroesophageal reflux disease without esophagitis  -     omeprazole (priLOSEC) 40 MG capsule; Take 1 capsule by mouth Daily.  Dispense: 90 capsule; Refill: 1    8. Pruritus  -     hydrOXYzine (ATARAX) 25 MG tablet; Take 1 tablet by mouth Every 8 (Eight) Hours As Needed for Itching.  Dispense: 270 tablet; Refill: 1    9. Muscle spasm  -     tiZANidine (ZANAFLEX) 2 MG tablet; Take 1 tablet by mouth 2 (Two) Times a Day As Needed for Muscle Spasms.  Dispense: 40 tablet; Refill: 2    10. Abdominal bloating    11. Prostate cancer screening  -     PSA Screen    12. Abnormal glucose  -     Hemoglobin A1c    13. Thrombocytopenia  -     CBC & Differential    14. Hypertension, unspecified type  -     Lipid Panel  -     Comprehensive Metabolic Panel  -     TSH    15. Restless leg      Patient is overall doing well,  stamina, balance, gait significantly improved from 1 year ago  Recommend trial of spironolactone at night d/t when taking in am, urinates for several hours when he lies down at night.   Elevate BLE and use compression stockings  Continue low sodium diet  Okay to just use chlorhexidine twice per week, resume daily if develops recurrence of skin infections  Recommend weight loss, he could not tolerate GLP-1, will try phentermine  Continue exercise, walking, stairs   F/u with hematology and gastro as directed    Labs today as ordered, will notify results      I spent 45 minutes caring for Farhad Khan on this date of service. This time includes time spent by me in the following activities: preparing for the visit, reviewing tests, performing a medically appropriate examination and/or evaluation , counseling and educating the patient/family/caregiver, ordering medications, tests, or procedures and documenting information in the medical record        Follow Up     Return in about 3 months (around 10/16/2025) for Recheck weight check, med refills .  Patient was given instructions and counseling regarding his condition or for health maintenance advice. Please see specific information pulled into the AVS if appropriate.        Part of this note may be an electronic transcription/translation of spoken language to printed text using the Dragon Dictation System

## 2025-07-17 ENCOUNTER — TELEPHONE (OUTPATIENT)
Dept: FAMILY MEDICINE CLINIC | Facility: CLINIC | Age: 45
End: 2025-07-17
Payer: COMMERCIAL

## 2025-07-17 LAB
25(OH)D3 SERPL-MCNC: 14 NG/ML (ref 30–100)
ALBUMIN SERPL-MCNC: 3.4 G/DL (ref 3.5–5.2)
ALBUMIN/GLOB SERPL: 0.9 G/DL
ALP SERPL-CCNC: 98 U/L (ref 39–117)
ALT SERPL W P-5'-P-CCNC: 11 U/L (ref 1–41)
ANION GAP SERPL CALCULATED.3IONS-SCNC: 7.8 MMOL/L (ref 5–15)
AST SERPL-CCNC: 25 U/L (ref 1–40)
BASOPHILS # BLD AUTO: 0 10*3/MM3 (ref 0–0.2)
BASOPHILS NFR BLD AUTO: 0 % (ref 0–1.5)
BILIRUB SERPL-MCNC: 1 MG/DL (ref 0–1.2)
BUN SERPL-MCNC: 3 MG/DL (ref 6–20)
BUN/CREAT SERPL: 3.8 (ref 7–25)
CALCIUM SPEC-SCNC: 8.8 MG/DL (ref 8.6–10.5)
CHLORIDE SERPL-SCNC: 102 MMOL/L (ref 98–107)
CHOLEST SERPL-MCNC: 170 MG/DL (ref 0–200)
CO2 SERPL-SCNC: 25.2 MMOL/L (ref 22–29)
CREAT SERPL-MCNC: 0.79 MG/DL (ref 0.76–1.27)
DEPRECATED RDW RBC AUTO: 49.2 FL (ref 37–54)
EGFRCR SERPLBLD CKD-EPI 2021: 111 ML/MIN/1.73
EOSINOPHIL # BLD AUTO: 0 10*3/MM3 (ref 0–0.4)
EOSINOPHIL NFR BLD AUTO: 0 % (ref 0.3–6.2)
ERYTHROCYTE [DISTWIDTH] IN BLOOD BY AUTOMATED COUNT: 15.8 % (ref 12.3–15.4)
GLOBULIN UR ELPH-MCNC: 3.8 GM/DL
GLUCOSE SERPL-MCNC: 106 MG/DL (ref 65–99)
HCT VFR BLD AUTO: 40.3 % (ref 37.5–51)
HDLC SERPL-MCNC: 32 MG/DL (ref 40–60)
HGB BLD-MCNC: 12.6 G/DL (ref 13–17.7)
IMM GRANULOCYTES # BLD AUTO: 0 10*3/MM3 (ref 0–0.05)
IMM GRANULOCYTES NFR BLD AUTO: 0 % (ref 0–0.5)
LDLC SERPL CALC-MCNC: 119 MG/DL (ref 0–100)
LDLC/HDLC SERPL: 3.68 {RATIO}
LYMPHOCYTES # BLD AUTO: 0.79 10*3/MM3 (ref 0.7–3.1)
LYMPHOCYTES NFR BLD AUTO: 41.6 % (ref 19.6–45.3)
MCH RBC QN AUTO: 26.6 PG (ref 26.6–33)
MCHC RBC AUTO-ENTMCNC: 31.3 G/DL (ref 31.5–35.7)
MCV RBC AUTO: 85 FL (ref 79–97)
MONOCYTES # BLD AUTO: 0.2 10*3/MM3 (ref 0.1–0.9)
MONOCYTES NFR BLD AUTO: 10.5 % (ref 5–12)
NEUTROPHILS NFR BLD AUTO: 0.91 10*3/MM3 (ref 1.7–7)
NEUTROPHILS NFR BLD AUTO: 47.9 % (ref 42.7–76)
NRBC BLD AUTO-RTO: 0 /100 WBC (ref 0–0.2)
PLATELET # BLD AUTO: 125 10*3/MM3 (ref 140–450)
PMV BLD AUTO: 10.3 FL (ref 6–12)
POTASSIUM SERPL-SCNC: 4.5 MMOL/L (ref 3.5–5.2)
PROT SERPL-MCNC: 7.2 G/DL (ref 6–8.5)
PSA SERPL-MCNC: 0.32 NG/ML (ref 0–4)
RBC # BLD AUTO: 4.74 10*6/MM3 (ref 4.14–5.8)
SODIUM SERPL-SCNC: 135 MMOL/L (ref 136–145)
TRIGL SERPL-MCNC: 102 MG/DL (ref 0–150)
TSH SERPL DL<=0.05 MIU/L-ACNC: 1.31 UIU/ML (ref 0.27–4.2)
VIT B12 BLD-MCNC: 738 PG/ML (ref 211–946)
VLDLC SERPL-MCNC: 19 MG/DL (ref 5–40)
WBC NRBC COR # BLD AUTO: 1.9 10*3/MM3 (ref 3.4–10.8)

## 2025-07-25 DIAGNOSIS — R20.0 NUMBNESS AND TINGLING IN BOTH HANDS: ICD-10-CM

## 2025-07-25 DIAGNOSIS — Z90.49 S/P APPENDECTOMY: ICD-10-CM

## 2025-07-25 DIAGNOSIS — R20.2 NUMBNESS AND TINGLING IN BOTH HANDS: ICD-10-CM

## 2025-07-25 RX ORDER — HYDROCODONE BITARTRATE AND ACETAMINOPHEN 5; 325 MG/1; MG/1
1 TABLET ORAL
Qty: 60 TABLET | Refills: 0 | Status: SHIPPED | OUTPATIENT
Start: 2025-07-25